# Patient Record
Sex: FEMALE | Race: BLACK OR AFRICAN AMERICAN | Employment: UNEMPLOYED | URBAN - METROPOLITAN AREA
[De-identification: names, ages, dates, MRNs, and addresses within clinical notes are randomized per-mention and may not be internally consistent; named-entity substitution may affect disease eponyms.]

---

## 2017-02-10 ENCOUNTER — APPOINTMENT (OUTPATIENT)
Dept: GENERAL RADIOLOGY | Age: 42
DRG: 191 | End: 2017-02-10
Attending: EMERGENCY MEDICINE
Payer: MEDICARE

## 2017-02-10 ENCOUNTER — HOSPITAL ENCOUNTER (INPATIENT)
Age: 42
LOS: 1 days | Discharge: HOME | DRG: 191 | End: 2017-02-11
Attending: EMERGENCY MEDICINE | Admitting: INTERNAL MEDICINE
Payer: MEDICARE

## 2017-02-10 DIAGNOSIS — D86.9 SARCOIDOSIS: ICD-10-CM

## 2017-02-10 DIAGNOSIS — R06.03 RESPIRATORY DISTRESS: Primary | ICD-10-CM

## 2017-02-10 PROBLEM — J44.0 COPD (CHRONIC OBSTRUCTIVE PULMONARY DISEASE) WITH ACUTE BRONCHITIS (HCC): Status: ACTIVE | Noted: 2017-02-10

## 2017-02-10 PROBLEM — J20.9 COPD (CHRONIC OBSTRUCTIVE PULMONARY DISEASE) WITH ACUTE BRONCHITIS (HCC): Status: ACTIVE | Noted: 2017-02-10

## 2017-02-10 LAB
ALBUMIN SERPL BCP-MCNC: 3.3 G/DL (ref 3.5–5)
ALBUMIN/GLOB SERPL: 0.8 {RATIO} (ref 1.1–2.2)
ALP SERPL-CCNC: 71 U/L (ref 45–117)
ALT SERPL-CCNC: 15 U/L (ref 12–78)
ANION GAP BLD CALC-SCNC: 11 MMOL/L (ref 5–15)
APPEARANCE UR: ABNORMAL
AST SERPL W P-5'-P-CCNC: 14 U/L (ref 15–37)
BACTERIA URNS QL MICRO: NEGATIVE /HPF
BASOPHILS # BLD AUTO: 0 K/UL (ref 0–0.1)
BASOPHILS # BLD: 0 % (ref 0–1)
BILIRUB SERPL-MCNC: 0.4 MG/DL (ref 0.2–1)
BILIRUB UR QL: NEGATIVE
BUN SERPL-MCNC: 7 MG/DL (ref 6–20)
BUN/CREAT SERPL: 7 (ref 12–20)
CALCIUM SERPL-MCNC: 8.7 MG/DL (ref 8.5–10.1)
CHLORIDE SERPL-SCNC: 108 MMOL/L (ref 97–108)
CK SERPL-CCNC: 98 U/L (ref 26–192)
CO2 SERPL-SCNC: 15 MMOL/L (ref 21–32)
COLOR UR: ABNORMAL
CREAT SERPL-MCNC: 1.06 MG/DL (ref 0.55–1.02)
DIFFERENTIAL METHOD BLD: ABNORMAL
EOSINOPHIL # BLD: 0 K/UL (ref 0–0.4)
EOSINOPHIL NFR BLD: 0 % (ref 0–7)
EPITH CASTS URNS QL MICRO: ABNORMAL /LPF
ERYTHROCYTE [DISTWIDTH] IN BLOOD BY AUTOMATED COUNT: 13.9 % (ref 11.5–14.5)
FLUAV AG NPH QL IA: NEGATIVE
FLUBV AG NOSE QL IA: NEGATIVE
GLOBULIN SER CALC-MCNC: 4.1 G/DL (ref 2–4)
GLUCOSE SERPL-MCNC: 94 MG/DL (ref 65–100)
GLUCOSE UR STRIP.AUTO-MCNC: NEGATIVE MG/DL
HCT VFR BLD AUTO: 43.9 % (ref 35–47)
HGB BLD-MCNC: 14.4 G/DL (ref 11.5–16)
HGB UR QL STRIP: NEGATIVE
HYALINE CASTS URNS QL MICRO: ABNORMAL /LPF (ref 0–5)
KETONES UR QL STRIP.AUTO: ABNORMAL MG/DL
LACTATE SERPL-SCNC: 1.4 MMOL/L (ref 0.4–2)
LEUKOCYTE ESTERASE UR QL STRIP.AUTO: NEGATIVE
LYMPHOCYTES # BLD AUTO: 5 % (ref 12–49)
LYMPHOCYTES # BLD: 0.3 K/UL (ref 0.8–3.5)
MCH RBC QN AUTO: 30.4 PG (ref 26–34)
MCHC RBC AUTO-ENTMCNC: 32.8 G/DL (ref 30–36.5)
MCV RBC AUTO: 92.8 FL (ref 80–99)
MONOCYTES # BLD: 0.9 K/UL (ref 0–1)
MONOCYTES NFR BLD AUTO: 14 % (ref 5–13)
NEUTS SEG # BLD: 5.3 K/UL (ref 1.8–8)
NEUTS SEG NFR BLD AUTO: 81 % (ref 32–75)
NITRITE UR QL STRIP.AUTO: NEGATIVE
PH UR STRIP: 8 [PH] (ref 5–8)
PLATELET # BLD AUTO: 156 K/UL (ref 150–400)
POTASSIUM SERPL-SCNC: 3.7 MMOL/L (ref 3.5–5.1)
PROT SERPL-MCNC: 7.4 G/DL (ref 6.4–8.2)
PROT UR STRIP-MCNC: ABNORMAL MG/DL
RBC # BLD AUTO: 4.73 M/UL (ref 3.8–5.2)
RBC #/AREA URNS HPF: ABNORMAL /HPF (ref 0–5)
RBC MORPH BLD: ABNORMAL
SODIUM SERPL-SCNC: 134 MMOL/L (ref 136–145)
SP GR UR REFRACTOMETRY: 1.02 (ref 1–1.03)
TROPONIN I SERPL-MCNC: <0.04 NG/ML
UA: UC IF INDICATED,UAUC: ABNORMAL
UROBILINOGEN UR QL STRIP.AUTO: 1 EU/DL (ref 0.2–1)
WBC # BLD AUTO: 6.5 K/UL (ref 3.6–11)
WBC URNS QL MICRO: ABNORMAL /HPF (ref 0–4)

## 2017-02-10 PROCEDURE — 85025 COMPLETE CBC W/AUTO DIFF WBC: CPT | Performed by: EMERGENCY MEDICINE

## 2017-02-10 PROCEDURE — 74011250636 HC RX REV CODE- 250/636: Performed by: INTERNAL MEDICINE

## 2017-02-10 PROCEDURE — 81001 URINALYSIS AUTO W/SCOPE: CPT | Performed by: EMERGENCY MEDICINE

## 2017-02-10 PROCEDURE — 83605 ASSAY OF LACTIC ACID: CPT | Performed by: EMERGENCY MEDICINE

## 2017-02-10 PROCEDURE — 84484 ASSAY OF TROPONIN QUANT: CPT | Performed by: EMERGENCY MEDICINE

## 2017-02-10 PROCEDURE — 82550 ASSAY OF CK (CPK): CPT | Performed by: EMERGENCY MEDICINE

## 2017-02-10 PROCEDURE — 94640 AIRWAY INHALATION TREATMENT: CPT

## 2017-02-10 PROCEDURE — 36415 COLL VENOUS BLD VENIPUNCTURE: CPT | Performed by: EMERGENCY MEDICINE

## 2017-02-10 PROCEDURE — 74011250637 HC RX REV CODE- 250/637: Performed by: INTERNAL MEDICINE

## 2017-02-10 PROCEDURE — 74011000250 HC RX REV CODE- 250: Performed by: EMERGENCY MEDICINE

## 2017-02-10 PROCEDURE — 77030013140 HC MSK NEB VYRM -A

## 2017-02-10 PROCEDURE — 99285 EMERGENCY DEPT VISIT HI MDM: CPT

## 2017-02-10 PROCEDURE — 87040 BLOOD CULTURE FOR BACTERIA: CPT | Performed by: EMERGENCY MEDICINE

## 2017-02-10 PROCEDURE — 74011000250 HC RX REV CODE- 250: Performed by: INTERNAL MEDICINE

## 2017-02-10 PROCEDURE — 71020 XR CHEST PA LAT: CPT

## 2017-02-10 PROCEDURE — 65660000000 HC RM CCU STEPDOWN

## 2017-02-10 PROCEDURE — 74011250637 HC RX REV CODE- 250/637: Performed by: EMERGENCY MEDICINE

## 2017-02-10 PROCEDURE — 74011250636 HC RX REV CODE- 250/636: Performed by: EMERGENCY MEDICINE

## 2017-02-10 PROCEDURE — 93005 ELECTROCARDIOGRAM TRACING: CPT

## 2017-02-10 PROCEDURE — 80053 COMPREHEN METABOLIC PANEL: CPT | Performed by: EMERGENCY MEDICINE

## 2017-02-10 PROCEDURE — 87804 INFLUENZA ASSAY W/OPTIC: CPT | Performed by: EMERGENCY MEDICINE

## 2017-02-10 RX ORDER — IPRATROPIUM BROMIDE 0.5 MG/2.5ML
0.5 SOLUTION RESPIRATORY (INHALATION)
Status: DISCONTINUED | OUTPATIENT
Start: 2017-02-10 | End: 2017-02-11 | Stop reason: HOSPADM

## 2017-02-10 RX ORDER — ISOSORBIDE MONONITRATE 30 MG/1
30 TABLET, EXTENDED RELEASE ORAL DAILY
COMMUNITY
End: 2019-04-24 | Stop reason: ALTCHOICE

## 2017-02-10 RX ORDER — MELOXICAM 15 MG/1
15 TABLET ORAL DAILY
COMMUNITY
End: 2017-11-30

## 2017-02-10 RX ORDER — TRAMADOL HYDROCHLORIDE 50 MG/1
50 TABLET ORAL
COMMUNITY
End: 2017-11-30

## 2017-02-10 RX ORDER — MELOXICAM 7.5 MG/1
15 TABLET ORAL DAILY
Status: DISCONTINUED | OUTPATIENT
Start: 2017-02-11 | End: 2017-02-11 | Stop reason: HOSPADM

## 2017-02-10 RX ORDER — LISINOPRIL 40 MG/1
40 TABLET ORAL DAILY
COMMUNITY
End: 2019-04-24 | Stop reason: ALTCHOICE

## 2017-02-10 RX ORDER — OMEPRAZOLE 20 MG/1
40 CAPSULE, DELAYED RELEASE ORAL DAILY
COMMUNITY
End: 2018-12-07

## 2017-02-10 RX ORDER — SPIRONOLACTONE 25 MG/1
25 TABLET ORAL 2 TIMES DAILY
Status: DISCONTINUED | OUTPATIENT
Start: 2017-02-10 | End: 2017-02-11 | Stop reason: HOSPADM

## 2017-02-10 RX ORDER — TOPIRAMATE 100 MG/1
100 TABLET, FILM COATED ORAL 2 TIMES DAILY
COMMUNITY
End: 2021-02-18 | Stop reason: ALTCHOICE

## 2017-02-10 RX ORDER — METOPROLOL TARTRATE 25 MG/1
25 TABLET, FILM COATED ORAL 2 TIMES DAILY
COMMUNITY
End: 2019-04-24 | Stop reason: ALTCHOICE

## 2017-02-10 RX ORDER — ATORVASTATIN CALCIUM 40 MG/1
40 TABLET, FILM COATED ORAL DAILY
Status: DISCONTINUED | OUTPATIENT
Start: 2017-02-11 | End: 2017-02-11 | Stop reason: HOSPADM

## 2017-02-10 RX ORDER — IPRATROPIUM BROMIDE 0.5 MG/2.5ML
0.5 SOLUTION RESPIRATORY (INHALATION) AS NEEDED
COMMUNITY
End: 2019-04-24 | Stop reason: ALTCHOICE

## 2017-02-10 RX ORDER — ISOSORBIDE MONONITRATE 30 MG/1
30 TABLET, EXTENDED RELEASE ORAL DAILY
Status: DISCONTINUED | OUTPATIENT
Start: 2017-02-11 | End: 2017-02-11 | Stop reason: HOSPADM

## 2017-02-10 RX ORDER — PANTOPRAZOLE SODIUM 40 MG/1
40 TABLET, DELAYED RELEASE ORAL
Status: DISCONTINUED | OUTPATIENT
Start: 2017-02-11 | End: 2017-02-11 | Stop reason: HOSPADM

## 2017-02-10 RX ORDER — LISINOPRIL 20 MG/1
40 TABLET ORAL DAILY
Status: DISCONTINUED | OUTPATIENT
Start: 2017-02-11 | End: 2017-02-11 | Stop reason: HOSPADM

## 2017-02-10 RX ORDER — ACETAMINOPHEN 325 MG/1
650 TABLET ORAL
Status: DISCONTINUED | OUTPATIENT
Start: 2017-02-10 | End: 2017-02-11

## 2017-02-10 RX ORDER — TOPIRAMATE 100 MG/1
100 TABLET, FILM COATED ORAL 2 TIMES DAILY
Status: DISCONTINUED | OUTPATIENT
Start: 2017-02-10 | End: 2017-02-11 | Stop reason: HOSPADM

## 2017-02-10 RX ORDER — LEVOFLOXACIN 5 MG/ML
500 INJECTION, SOLUTION INTRAVENOUS EVERY 24 HOURS
Status: DISCONTINUED | OUTPATIENT
Start: 2017-02-10 | End: 2017-02-11 | Stop reason: HOSPADM

## 2017-02-10 RX ORDER — HYDROXYZINE PAMOATE 25 MG/1
25 CAPSULE ORAL 2 TIMES DAILY
COMMUNITY
End: 2019-04-24

## 2017-02-10 RX ORDER — METOPROLOL TARTRATE 25 MG/1
25 TABLET, FILM COATED ORAL 2 TIMES DAILY
Status: DISCONTINUED | OUTPATIENT
Start: 2017-02-10 | End: 2017-02-11 | Stop reason: HOSPADM

## 2017-02-10 RX ORDER — ACYCLOVIR 400 MG/1
400 TABLET ORAL 2 TIMES DAILY
COMMUNITY
End: 2019-04-24 | Stop reason: ALTCHOICE

## 2017-02-10 RX ORDER — ALBUTEROL SULFATE 0.83 MG/ML
1.25 SOLUTION RESPIRATORY (INHALATION)
Status: DISCONTINUED | OUTPATIENT
Start: 2017-02-10 | End: 2017-02-11 | Stop reason: HOSPADM

## 2017-02-10 RX ORDER — FLUTICASONE PROPIONATE 50 MCG
2 SPRAY, SUSPENSION (ML) NASAL DAILY
Status: DISCONTINUED | OUTPATIENT
Start: 2017-02-11 | End: 2017-02-11 | Stop reason: HOSPADM

## 2017-02-10 RX ORDER — OMEPRAZOLE 20 MG/1
40 CAPSULE, DELAYED RELEASE ORAL DAILY
Status: DISCONTINUED | OUTPATIENT
Start: 2017-02-11 | End: 2017-02-10 | Stop reason: CLARIF

## 2017-02-10 RX ORDER — HYDROXYZINE 25 MG/1
25 TABLET, FILM COATED ORAL 2 TIMES DAILY
Status: DISCONTINUED | OUTPATIENT
Start: 2017-02-10 | End: 2017-02-11 | Stop reason: HOSPADM

## 2017-02-10 RX ORDER — HYDROCODONE POLISTIREX AND CHLORPHENIRAMINE POLISTIREX 10; 8 MG/5ML; MG/5ML
5 SUSPENSION, EXTENDED RELEASE ORAL
Status: DISCONTINUED | OUTPATIENT
Start: 2017-02-10 | End: 2017-02-11 | Stop reason: HOSPADM

## 2017-02-10 RX ORDER — TRAMADOL HYDROCHLORIDE 50 MG/1
50 TABLET ORAL
Status: DISCONTINUED | OUTPATIENT
Start: 2017-02-10 | End: 2017-02-11 | Stop reason: HOSPADM

## 2017-02-10 RX ORDER — BENZONATATE 100 MG/1
100 CAPSULE ORAL
Status: DISCONTINUED | OUTPATIENT
Start: 2017-02-10 | End: 2017-02-11 | Stop reason: HOSPADM

## 2017-02-10 RX ORDER — SPIRONOLACTONE 25 MG/1
25 TABLET ORAL 2 TIMES DAILY
COMMUNITY
End: 2018-12-07

## 2017-02-10 RX ORDER — FLUTICASONE PROPIONATE 50 MCG
2 SPRAY, SUSPENSION (ML) NASAL DAILY
COMMUNITY

## 2017-02-10 RX ORDER — SODIUM CHLORIDE 0.9 % (FLUSH) 0.9 %
5-10 SYRINGE (ML) INJECTION EVERY 8 HOURS
Status: DISCONTINUED | OUTPATIENT
Start: 2017-02-10 | End: 2017-02-11 | Stop reason: HOSPADM

## 2017-02-10 RX ORDER — ACETAMINOPHEN 500 MG
1000 TABLET ORAL ONCE
Status: COMPLETED | OUTPATIENT
Start: 2017-02-10 | End: 2017-02-10

## 2017-02-10 RX ORDER — GABAPENTIN 600 MG/1
600 TABLET ORAL 2 TIMES DAILY
COMMUNITY
End: 2017-11-30

## 2017-02-10 RX ORDER — SODIUM CHLORIDE 0.9 % (FLUSH) 0.9 %
5-10 SYRINGE (ML) INJECTION AS NEEDED
Status: DISCONTINUED | OUTPATIENT
Start: 2017-02-10 | End: 2017-02-11 | Stop reason: HOSPADM

## 2017-02-10 RX ORDER — GABAPENTIN 600 MG/1
600 TABLET ORAL 2 TIMES DAILY
Status: DISCONTINUED | OUTPATIENT
Start: 2017-02-10 | End: 2017-02-11 | Stop reason: HOSPADM

## 2017-02-10 RX ORDER — FUROSEMIDE 40 MG/1
40 TABLET ORAL DAILY
Status: DISCONTINUED | OUTPATIENT
Start: 2017-02-11 | End: 2017-02-11 | Stop reason: HOSPADM

## 2017-02-10 RX ADMIN — GABAPENTIN 600 MG: 600 TABLET, FILM COATED ORAL at 18:25

## 2017-02-10 RX ADMIN — TOPIRAMATE 100 MG: 100 TABLET, FILM COATED ORAL at 18:25

## 2017-02-10 RX ADMIN — Medication 10 ML: at 18:24

## 2017-02-10 RX ADMIN — Medication 10 ML: at 21:49

## 2017-02-10 RX ADMIN — METHYLPREDNISOLONE SODIUM SUCCINATE 60 MG: 125 INJECTION, POWDER, FOR SOLUTION INTRAMUSCULAR; INTRAVENOUS at 18:23

## 2017-02-10 RX ADMIN — TRAMADOL HYDROCHLORIDE 50 MG: 50 TABLET, FILM COATED ORAL at 15:40

## 2017-02-10 RX ADMIN — SPIRONOLACTONE 25 MG: 25 TABLET, FILM COATED ORAL at 18:25

## 2017-02-10 RX ADMIN — ALBUTEROL SULFATE 1 DOSE: 2.5 SOLUTION RESPIRATORY (INHALATION) at 14:44

## 2017-02-10 RX ADMIN — BENZONATATE 100 MG: 100 CAPSULE ORAL at 18:24

## 2017-02-10 RX ADMIN — SODIUM CHLORIDE 1000 ML: 900 INJECTION, SOLUTION INTRAVENOUS at 13:27

## 2017-02-10 RX ADMIN — LEVOFLOXACIN 500 MG: 5 INJECTION, SOLUTION INTRAVENOUS at 15:40

## 2017-02-10 RX ADMIN — METHYLPREDNISOLONE SODIUM SUCCINATE 60 MG: 125 INJECTION, POWDER, FOR SOLUTION INTRAMUSCULAR; INTRAVENOUS at 23:22

## 2017-02-10 RX ADMIN — ACETAMINOPHEN 1000 MG: 500 TABLET, FILM COATED ORAL at 13:27

## 2017-02-10 RX ADMIN — ALBUTEROL SULFATE 1.25 MG: 2.5 SOLUTION RESPIRATORY (INHALATION) at 21:40

## 2017-02-10 RX ADMIN — HYDROXYZINE HYDROCHLORIDE 25 MG: 25 TABLET, FILM COATED ORAL at 21:47

## 2017-02-10 RX ADMIN — METOPROLOL TARTRATE 25 MG: 25 TABLET ORAL at 21:46

## 2017-02-10 RX ADMIN — TRAMADOL HYDROCHLORIDE 50 MG: 50 TABLET, FILM COATED ORAL at 23:22

## 2017-02-10 RX ADMIN — ACETAMINOPHEN 650 MG: 325 TABLET, FILM COATED ORAL at 18:24

## 2017-02-10 RX ADMIN — IPRATROPIUM BROMIDE 0.5 MG: 0.5 SOLUTION RESPIRATORY (INHALATION) at 21:40

## 2017-02-10 NOTE — H&P
[address]   [address] [place] [address]   HISTORY AND PHYSICAL       Name:  Nitin Castillo   MR#:  [id number]   :  1975   Account #:  [de-identified]        Date of Adm:  02/10/2017       PRIMARY ADMITTING DIAGNOSES   1. Acute respiratory distress. 2. Low-grade temperature, likely viral syndrome. 2. Acute bronchitis. 3. Possible underlying chronic obstructive pulmonary disease   exacerbation. SECONDARY DIAGNOSES   1. History of schizophrenia for which she is on Latuda and Depakote. 2. History of cocaine dependence. 3. History of secondary pulmonary hypertension for which she wears   CPAP at night. 4. Obesity hypoventilation syndrome for which she is on CPAP, but no   longer qualifies for home oxygen. 5. History of recurrent depression. 6. Bipolar disorder. 7. History of sarcoidosis. 8. Bilateral knee pain secondary to osteoarthritis and obesity. 9. History of chronic kidney disease, stage I.   10. Chronic pain. 11. Hypertensive cardiomyopathy for which she is on lisinopril,   preserved EF.    HOME MEDICATIONS   Include:   1. Latuda 20 mg p.o. daily. 2. Ventolin inhaler 3 puffs twice daily as needed. 3. Vistaril 25 mg twice daily. 4. Acyclovir 500 mg twice daily. 5. Atorvastatin 20 mg daily. 6. Flonase 2 sprays each nostril. 7. Lasix 40 mg as directed. 8. Gabapentin 600 mg twice daily. 9. Atrovent inhaler every 6 hours as needed. 10. Imdur 30 mg daily. 11. Lisinopril 40 mg daily. 12. Meloxicam 15 mg daily. 13. Metoprolol 25 mg twice daily. 14. Omeprazole 20 mg 2 tabs daily. 15. Spironolactone 25 mg twice daily. 16. Topamax 100 mg twice daily. 17. Tramadol 50 mg 1 p.o. t.i.d. PAST SURGICAL HISTORY: Includes a hernia repair at age 16. FAMILY HISTORY: Noncontributory. SOCIAL HISTORY: She does have current tobacco use. A fourth pack   per day for 23 years. No history of alcohol.  She denies a history of   drug use, but has a previous medical history of cocaine abuse. She is   sexually active. Wishes to be FULL CODE at this time. ALLERGIES: INCLUDE MORPHINE. HISTORY OF PRESENT ILLNESS: The patient is a 20-year-old   female who presented originally to the [facility] office after a history   since Sunday of having worsening shortness of breath, cough, fevers,   malaise, arthralgias and chills at home. The patient when asked why   she did not come into the office earlier, she reported that she thought   this was simply a cold that she would get over, but unfortunately her   symptoms continued to worsen to the point that she was having   difficulty breathing last night. She, therefore, came into the [facility]   office for evaluation today. Although her chest x-ray was unremarkable,   she has a long-standing history of pneumonia, as well as COPD and   sarcoidosis. The patient had a flu test as well, which came back   negative in the office and it was felt that given her symptoms, she   could either be treated in the office or come into the emergency room. The patient had reported that she did not want to be treated in the   office, but instead was very concerned and wanted to be transferred to   the ER. She was noted to have a low-grade temperature of \"105\", but   saturations of 100% in the emergency room, however, she continued   to complain of chest pain and shortness of breath. It was felt that she   would need to come in for further evaluation and workup. REVIEW OF SYSTEMS   CONSTITUTIONAL: Positive for the fevers and chills. She also reports   generalized malaise and weakness. She reports a sore throat, but no   cough, which has been nonproductive. No rhinorrhea. She does have   some congestion. She reports some chest pain with her cough, but no   history of coronary artery disease and no dyspnea on exertion. No   orthopnea. GI: Positive for diffuse abdominal pain, but no diarrhea or constipation.    No history of nausea or vomiting. No history of black or bloody stools. : No hematuria or dysuria. MUSCULOSKELETAL: She has chronic arthralgias as well as   malaise. She reports chronic knee pain. She also has chronic back   pain. SKIN: No new rashes. NEUROLOGIC: No history of CVA, TIA, seizures, or syncope. PSYCHIATRIC: Positive for schizophrenia as well as bipolar disorder. PHYSICAL EXAMINATION   VITAL SIGNS: Temperature 100.5, pulse of 109, respirations of 15,   blood pressure 121/61, saturations 99% on 2 liters nasal cannula. GENERAL: The patient is awake, alert. She is oriented to person,   place and time, not in acute distress. HEENT: Normocephalic, atraumatic. Pupils are round and reactive to   light. Extraocular muscles are intact. NECK: Supple, no JVD, no carotid bruit. CARDIOVASCULAR: Regular S1, S2, no murmurs, gallops, or rubs   appreciated. LUNGS: Clear to auscultation bilaterally with mild end-expiratory   wheeze but otherwise, no rhonchi, no rales, no retractions, no   increased work of breathing noted. ABDOMEN: Obese. Soft, nontender, nondistended, positive bowel   sounds. EXTREMITIES: No clubbing, cyanosis or edema is noted. NEUROLOGIC: The patient is awake, alert, oriented. Speech,   language, memory is intact. Cranial nerves 2 through 12 are grossly   intact. No focal neurologic deficits noted. PSYCHIATRIC: Mood and affect are appropriate. LABORATORY DATA: WBC 6.5, hemoglobin 14.4, hematocrit 43.9,   platelets 041. UA is largely unremarkable. Sodium 134, potassium 3.7,   chloride 108, bicarbonate 15, BUN 7, creatinine 1.06, glucose 94,   albumin 3.3, ALT 15, AST 14, alkaline phosphatase 71. Cardiac   enzymes negative. Influenza A and B negative. Blood cultures drawn,   currently pending.     ASSESSMENT AND PLAN: Respiratory distress, likely multifactorial   secondary to obstructive sleep apnea as well as possible underlying   viral syndrome and mild chronic obstructive pulmonary disease    exacerbation. I have asked Pulmonary to evaluate as she does follow   pulmonary on an outpatient basis. We will start her current nebulizers   as well as steroids, continue her CPAP at night. We will give Levaquin   for now for chronic obstructive pulmonary disease exacerbation,   although I do not feel that antibiotics in general are likely needed   outpatient. We will continue her Lasix for diuresis and monitor her   kidney function. If she improves in the next 24 hours, we will discharge   to home. We will repeat an ABG in the morning.         [facility] D[facility] / [email or web address]   D:  02/10/2017   15:37   T:  02/10/2017   16:18   Job #:  [id number]

## 2017-02-10 NOTE — IP AVS SNAPSHOT
Summary of Care Report The Summary of Care report has been created to help improve care coordination. Users with access to [email or web address] or [address] (Web-based application) may access additional patient information including the Discharge Summary. If you are not currently a [address] user and need more information, please call the number listed below in the [place] [address] section and ask to be connected with Medical Records. Facility Information Name Address Phone [address] [facility] 7 [id number] [phone] Patient Information Patient Name Sex OCTAVIO Travis ([id number]) Female 1975 Discharge Information Admitting Provider Service Area [facility] DO / [address] [place] [address] 4 Surg/Bariatrics / [phone] Discharge Provider Discharge Date/Time Discharge Disposition Destination (none) 2017 Midday (Pending) AHR (none) Patient Language Language ENGLISH [13] Problem List as of 2017  Date Reviewed: 2014 Codes Priority Class Noted - Resolved Sarcoidosis ([facility].) ICD-10-CM: D86.9 ICD-9-CM: 135   Unknown - Present RESOLVED: Obesity ICD-10-CM: E66.9 ICD-9-CM: 278.00   Unknown - 1/3/2016 Hypertension ICD-10-CM: I10 
ICD-9-CM: 401.9   Unknown - Present Bipolar disorder ([facility].) ICD-10-CM: F31.9 ICD-9-CM: 296.80   Unknown - Present Asthma ICD-10-CM: K31.597 ICD-9-CM: 493.90   Unknown - Present Obstructive sleep apnea ICD-10-CM: G47.33 
ICD-9-CM: 327.23   Unknown - Present GERD (gastroesophageal reflux disease) ICD-10-CM: K21.9 ICD-9-CM: 530.81   Unknown - Present Arthritis ICD-10-CM: M19.90 ICD-9-CM: 716.90   Unknown - Present Morbid obesity ([facility].) ICD-10-CM: E66.01 
ICD-9-CM: 278.01   Unknown - Present RESOLVED: Respiratory failure with hypoxia ([facility] ICD-10-CM: J96.91 
ICD-9-CM: 518.81   11/5/2015 - 1/3/2016 Respiratory failure with hypoxia and hypercapnia (HCC) ICD-10-CM: J96.91, J96.92 
ICD-9-CM: 518.81   1/3/2016 - Present COPD (chronic obstructive pulmonary disease) with acute bronchitis (HCC) ICD-10-CM: J44.0 ICD-9-CM: 491.22   2/10/2017 - Present You are allergic to the following Allergen Reactions Morphine Itching Swelling Current Discharge Medication List  
  
START taking these medications Dose & Instructions Dispensing Information Comments  
 acetaminophen 32MG/ML Soln solution Commonly known as:  TYLENOL Dose:  650 mg Take 20.3 mL by mouth every six (6) hours as needed. Quantity:  500 mL Refills:  0  
   
 chlorpheniramine-HYDROcodone 10-8 mg/5 mL suspension Commonly known as:  Leonetta Castro Dose:  5 mL Take 5 mL by mouth every twelve (12) hours as needed for Cough. Max Daily Amount: 10 mL. Quantity:  100 mL Refills:  0  
   
 levoFLOXacin 500 mg tablet Commonly known as:  Yomi Claude Dose:  500 mg Take 1 Tab by mouth daily. Quantity:  10 Tab Refills:  0 CONTINUE these medications which have NOT CHANGED Dose & Instructions Dispensing Information Comments  
 acyclovir 400 mg tablet Commonly known as:  ZOVIRAX Dose:  400 mg Take 400 mg by mouth two (2) times a day. Refills:  0  
   
 albuterol 90 mcg/actuation inhaler Commonly known as:  PROVENTIL HFA, VENTOLIN HFA, PROAIR HFA Dose:  2 Puff Take 2 Puffs by inhalation every four (4) hours as needed for Wheezing. Quantity:  1 Inhaler Refills:  1  
   
 albuterol sulfate 2.5 mg/0.5 mL Nebu 2.5 mg, ipratropium 0.02 % Soln 0.5 mg  
 Dose:  1 Dose 1 Dose by Nebulization route two (2) times a day. Refills:  0  
   
 atorvastatin 20 mg tablet Commonly known as:  LIPITOR Dose:  40 mg Take 40 mg by mouth daily. Refills:  0 FLONASE 50 mcg/actuation nasal spray Generic drug:  fluticasone Dose:  2 Spray 2 Sprays by Both Nostrils route daily. Refills:  0  
   
 furosemide 40 mg tablet Commonly known as:  LASIX Dose:  40 mg Take 40 mg by mouth daily. Refills:  0  
   
 gabapentin 600 mg tablet Commonly known as:  NEURONTIN Dose:  600 mg Take 600 mg by mouth two (2) times a day. Refills:  0  
   
 ipratropium 0.02 % nebulizer solution Commonly known as:  ATROVENT Dose:  0.5 mg  
0.5 mg by Nebulization route as needed for Wheezing. Refills:  0  
   
 isosorbide mononitrate ER 30 mg tablet Commonly known as:  IMDUR Dose:  30 mg Take 30 mg by mouth daily. Refills:  0  
   
 LATUDA 20 mg Tab tablet Generic drug:  lurasidone Dose:  20 mg Take 20 mg by mouth daily. Refills:  0  
   
 lisinopril 40 mg tablet Commonly known as:  Monika November Dose:  40 mg Take 40 mg by mouth daily. Refills:  0  
   
 meloxicam 15 mg tablet Commonly known as:  MOBIC Dose:  15 mg Take 15 mg by mouth daily. Refills:  0  
   
 metoprolol tartrate 25 mg tablet Commonly known as:  LOPRESSOR Dose:  25 mg Take 25 mg by mouth two (2) times a day. Refills:  0  
   
 omeprazole 20 mg capsule Commonly known as:  PRILOSEC Dose:  40 mg Take 40 mg by mouth daily. Refills:  0  
   
 spironolactone 25 mg tablet Commonly known as:  ALDACTONE Dose:  25 mg Take 25 mg by mouth two (2) times a day. Refills:  0  
   
 topiramate 100 mg tablet Commonly known as:  TOPAMAX Dose:  100 mg Take 100 mg by mouth two (2) times a day. Refills:  0  
   
 traMADol 50 mg tablet Commonly known as:  ULTRAM  
 Dose:  50 mg Take 50 mg by mouth every eight (8) hours as needed for Pain. Refills:  0  
   
 VISTARIL 25 mg capsule Generic drug:  hydrOXYzine pamoate Dose:  25 mg Take 25 mg by mouth two (2) times a day. Refills:  0 Current Immunizations Name Date PPD 1/1/2011 Follow-up Information Follow up With Details Comments Contact Info Provider Unknown   Patient not available to ask Discharge Instructions DISCHARGE SUMMARY from Nurse The following personal items are in your possession at time of discharge: 
 
Dental Appliances: None Visual Aid: None Home Medications: None Jewelry: None Clothing: At bedside Other Valuables: None The discharge information has been reviewed with the patient. The patient verbalized understanding. Discharge medications reviewed with the patient and appropriate educational materials and side effects teaching were provided. Chart Review Routing History Recipient Method Report Sent By Lorie White MD  
Phone: [phone] In ADVIZE IP Auto Routed Notes Delfino Peng MD [[id number]] 5/6/2013  2:03 PM 05/06/2013 Darshan Cardenas MD  
Phone: [phone] In ADVIZE Note Review Delfino Peng MD [[id number]] 12/2/2013  9:28 AM 11/27/2013 Darshan Cardenas MD  
Phone: [phone] In Basket IP Auto Routed Anaya Herr MD [[id number]] 1/9/2014  8:19 PM 01/09/2014 Anupama White MD  
Phone: [phone] In Basket IP Auto Routed Anaya Herr MD [[id number]] 1/9/2014  8:19 PM 01/09/2014 Provider Unknown, MD  
Patient not available to ask Sarah Parisi Mail IP Auto Routed Notes [place], [place] [[id number]] 2/11/2017 12:17 PM 02/11/2017

## 2017-02-10 NOTE — IP AVS SNAPSHOT
[address] 
[phone] Patient: Wendi Eckert MRN: [id number] :1975 You are allergic to the following Allergen Reactions Morphine Itching Swelling Recent Documentation Height Weight BMI OB Status Smoking Status 1.549 m 154.2 kg 64.24 kg/m2 Having regular periods Current Every Day Smoker Unresulted Labs Order Current Status CULTURE, BLOOD Preliminary result CULTURE, BLOOD Preliminary result Emergency Contacts Name Discharge Info Relation Home Work Mobile Jh Coelho N/A  AT THIS TIME [6] Mother [14] [phone] About your hospitalization You were admitted on:  February 10, 2017 You last received care in the:  [facility] 4 SURG/BARIATRICS You were discharged on:  2017 Unit phone number:  [phone] Why you were hospitalized Your primary diagnosis was:  Not on File Your diagnoses also included:  Copd (Chronic Obstructive Pulmonary Disease) With Acute Bronchitis (Hcc) Providers Seen During Your Hospitalizations Provider Role Specialty Primary office phone Sarah Cochran MD Attending Provider Emergency Medicine [phone] [address] [place] [address] Attending Provider Internal Medicine [phone] Your Primary Care Physician (PCP) Primary Care Physician Office Phone Office Fax UNKNOWN, PROVIDER ** None ** ** None ** Follow-up Information Follow up With Details Comments Contact Info Provider Unknown   Patient not available to ask Current Discharge Medication List  
  
START taking these medications Dose & Instructions Dispensing Information Comments Morning Noon Evening Bedtime  
 acetaminophen 32MG/ML Soln solution Commonly known as:  TYLENOL Your next dose is: Today, Tomorrow Other:  _________  Dose:  650 mg  
 Take 20.3 mL by mouth every six (6) hours as needed. Quantity:  500 mL Refills:  0  
     
   
   
   
  
 chlorpheniramine-HYDROcodone 10-8 mg/5 mL suspension Commonly known as:  Ventura Siemens Your next dose is: Today, Tomorrow Other:  _________ Dose:  5 mL Take 5 mL by mouth every twelve (12) hours as needed for Cough. Max Daily Amount: 10 mL. Quantity:  100 mL Refills:  0  
     
   
   
   
  
 levoFLOXacin 500 mg tablet Commonly known as:  Harjinder Chung Your next dose is: Today, Tomorrow Other:  _________ Dose:  500 mg Take 1 Tab by mouth daily. Quantity:  10 Tab Refills:  0 CONTINUE these medications which have NOT CHANGED Dose & Instructions Dispensing Information Comments Morning Noon Evening Bedtime  
 acyclovir 400 mg tablet Commonly known as:  ZOVIRAX Your next dose is: Today, Tomorrow Other:  _________ Dose:  400 mg Take 400 mg by mouth two (2) times a day. Refills:  0  
     
   
   
   
  
 albuterol 90 mcg/actuation inhaler Commonly known as:  PROVENTIL HFA, VENTOLIN HFA, PROAIR HFA Your next dose is: Today, Tomorrow Other:  _________ Dose:  2 Puff Take 2 Puffs by inhalation every four (4) hours as needed for Wheezing. Quantity:  1 Inhaler Refills:  1  
     
   
   
   
  
 albuterol sulfate 2.5 mg/0.5 mL Nebu 2.5 mg, ipratropium 0.02 % Soln 0.5 mg Your next dose is: Today, Tomorrow Other:  _________ Dose:  1 Dose 1 Dose by Nebulization route two (2) times a day. Refills:  0  
     
   
   
   
  
 atorvastatin 20 mg tablet Commonly known as:  LIPITOR Your next dose is: Today, Tomorrow Other:  _________ Dose:  40 mg Take 40 mg by mouth daily. Refills:  0  
     
   
   
   
  
 FLONASE 50 mcg/actuation nasal spray Generic drug:  fluticasone Your next dose is: Today, Tomorrow Other:  _________ Dose:  2 Spray 2 Sprays by Both Nostrils route daily. Refills:  0  
     
   
   
   
  
 furosemide 40 mg tablet Commonly known as:  LASIX Your next dose is: Today, Tomorrow Other:  _________ Dose:  40 mg Take 40 mg by mouth daily. Refills:  0  
     
   
   
   
  
 gabapentin 600 mg tablet Commonly known as:  NEURONTIN Your next dose is: Today, Tomorrow Other:  _________ Dose:  600 mg Take 600 mg by mouth two (2) times a day. Refills:  0  
     
   
   
   
  
 ipratropium 0.02 % nebulizer solution Commonly known as:  ATROVENT Your next dose is: Today, Tomorrow Other:  _________ Dose:  0.5 mg  
0.5 mg by Nebulization route as needed for Wheezing. Refills:  0  
     
   
   
   
  
 isosorbide mononitrate ER 30 mg tablet Commonly known as:  IMDUR Your next dose is: Today, Tomorrow Other:  _________ Dose:  30 mg Take 30 mg by mouth daily. Refills:  0  
     
   
   
   
  
 LATUDA 20 mg Tab tablet Generic drug:  lurasidone Your next dose is: Today, Tomorrow Other:  _________ Dose:  20 mg Take 20 mg by mouth daily. Refills:  0  
     
   
   
   
  
 lisinopril 40 mg tablet Commonly known as:  Mollie Ripa Your next dose is: Today, Tomorrow Other:  _________ Dose:  40 mg Take 40 mg by mouth daily. Refills:  0  
     
   
   
   
  
 meloxicam 15 mg tablet Commonly known as:  MOBIC Your next dose is: Today, Tomorrow Other:  _________ Dose:  15 mg Take 15 mg by mouth daily. Refills:  0  
     
   
   
   
  
 metoprolol tartrate 25 mg tablet Commonly known as:  LOPRESSOR Your next dose is: Today, Tomorrow Other:  _________ Dose:  25 mg Take 25 mg by mouth two (2) times a day. Refills:  0 omeprazole 20 mg capsule Commonly known as:  PRILOSEC Your next dose is: Today, Tomorrow Other:  _________ Dose:  40 mg Take 40 mg by mouth daily. Refills:  0  
     
   
   
   
  
 spironolactone 25 mg tablet Commonly known as:  ALDACTONE Your next dose is: Today, Tomorrow Other:  _________ Dose:  25 mg Take 25 mg by mouth two (2) times a day. Refills:  0  
     
   
   
   
  
 topiramate 100 mg tablet Commonly known as:  TOPAMAX Your next dose is: Today, Tomorrow Other:  _________ Dose:  100 mg Take 100 mg by mouth two (2) times a day. Refills:  0  
     
   
   
   
  
 traMADol 50 mg tablet Commonly known as:  ULTRAM  
   
Your next dose is: Today, Tomorrow Other:  _________ Dose:  50 mg Take 50 mg by mouth every eight (8) hours as needed for Pain. Refills:  0  
     
   
   
   
  
 VISTARIL 25 mg capsule Generic drug:  hydrOXYzine pamoate Your next dose is: Today, Tomorrow Other:  _________ Dose:  25 mg Take 25 mg by mouth two (2) times a day. Refills:  0 Where to Get Your Medications Information on where to get these meds will be given to you by the nurse or doctor. ! Ask your nurse or doctor about these medications  
  acetaminophen 32MG/ML Soln solution  
 chlorpheniramine-HYDROcodone 10-8 mg/5 mL suspension  
 levoFLOXacin 500 mg tablet Discharge Instructions DISCHARGE SUMMARY from Nurse The following personal items are in your possession at time of discharge: 
 
Dental Appliances: None Visual Aid: None Home Medications: None Jewelry: None Clothing: At bedside Other Valuables: None The discharge information has been reviewed with the patient. The patient verbalized understanding.  
 
Discharge medications reviewed with the patient and appropriate educational materials and side effects teaching were provided. Discharge Orders None [employer] Announcement We are excited to announce that we are making your provider's discharge notes available to you in [employer]. You will see these notes when they are completed and signed by the physician that discharged you from your recent hospital stay. If you have any questions or concerns about any information you see in [employer], please call the Health Information Department where you were seen or reach out to your Primary Care Provider for more information about your plan of care. Introducing \[facility]\"" & HEALTH SERVICES! [address] [facility] introduces MeetingSprout patient portal. Now you can access parts of your medical record, email your doctor's office, and request medication refills online. 1. In your internet browser, go to [email or web address] 2. Click on the First Time User? Click Here link in the Sign In box. You will see the New Member Sign Up page. 3. Enter your [employer]Spr[employer] Access Code exactly as it appears below. You will not need to use this code after youve completed the sign-up process. If you do not sign up before the expiration date, you must request a new code. · [employer] Access Code: [email or web address] Expires: 5/11/2017 12:46 PM 
 
4. Enter the last four digits of your Social Security Number (xxxx) and Date of Birth (mm/dd/yyyy) as indicated and click Submit. You will be taken to the next sign-up page. 5. Create a MeetingSprout ID. This will be your [employer]Spr[employer] login ID and cannot be changed, so think of one that is secure and easy to remember. 6. Create a [employer] password. You can change your password at any time. 7. Enter your Password Reset Question and Answer. This can be used at a later time if you forget your password. 8. Enter your e-mail address. You will receive e-mail notification when new information is available in [address] 9. Click Sign Up. You can now view and download portions of your medical record. 10. Click the Download Summary menu link to download a portable copy of your medical information. If you have questions, please visit the Frequently Asked Questions section of the [employer] website. Remember, [employer] is NOT to be used for urgent needs. For medical emergencies, dial 911. Now available from your iPhone and Android! General Information Please provide this summary of care documentation to your next provider. Patient Signature:  ____________________________________________________________ Date:  ____________________________________________________________  
  
Dimas Calvo Provider Signature:  ____________________________________________________________ Date:  ____________________________________________________________

## 2017-02-10 NOTE — PROGRESS NOTES
Admission Medication Reconciliation:    Information obtained from: Patient, External medication records, and Rx Query    Significant PMH/Disease States:   Past Medical History   Diagnosis Date    Arthritis     Asthma     B[facility] disorder [facility]     GERD (gastroesophageal reflux disease)     Heart failure ([facility]     Hypertension     Ill-defined condition      sarcoidosis    Morbid obesity ([facility]     Obstructive sleep apnea     Pseudotumor cerebri     Psychiatric disorder     Sarcoidosis ([facility]     Schizophrenia ([facility]        Chief Complaint for this Admission:  Generalized Body Aches: SOB; cough    Allergies:  Morphine    Prior to Admission Medications:   Prior to Admission Medications   Prescriptions Last Dose Informant Patient Reported? Taking?   acyclovir (ZOVIRAX) 400 mg tablet 2017 at Unknown time  Yes Yes   Sig: Take 400 mg by mouth two (2) times a day. albuterol (PROVENTIL HFA, VENTOLIN HFA, PROAIR HFA) 90 mcg/actuation inhaler   No Yes   Sig: Take 2 Puffs by inhalation every four (4) hours as needed for Wheezing. albuterol sulfate 2.5 mg/0.5 mL Nebu 2.5 mg, ipratropium 0.02 % Soln 0.5 mg   Yes Yes   Si Dose by Nebulization route two (2) times a day. atorvastatin (LIPITOR) 20 mg tablet 2017 at Unknown time  Yes Yes   Sig: Take  by mouth daily. fluticasone (FLONASE) 50 mcg/actuation nasal spray 2017 at Unknown time  Yes Yes   Si Sprays by Both Nostrils route daily. furosemide (LASIX) 40 mg tablet 2017 at Unknown time  Yes Yes   Sig: Take 40 mg by mouth daily. gabapentin (NEURONTIN) 600 mg tablet 2017 at Unknown time  Yes Yes   Sig: Take 600 mg by mouth two (2) times a day.   hydrOXYzine pamoate (VISTARIL) 25 mg capsule 2017 at Unknown time  Yes Yes   Sig: Take 25 mg by mouth two (2) times a day. ipratropium (ATROVENT) 0.02 % nebulizer solution   Yes Yes   Si.5 mg by Nebulization route as needed for Wheezing.    isosorbide mononitrate ER (IMDUR) 30 mg tablet 2/9/2017 at Unknown time  Yes Yes   Sig: Take 30 mg by mouth daily. lisinopril (PRINIVIL, ZESTRIL) 40 mg tablet 2/9/2017 at Unknown time  Yes Yes   Sig: Take 40 mg by mouth daily. lurasidone (LATUDA) 20 mg tab tablet 2/9/2017 at Unknown time  Yes Yes   Sig: Take 20 mg by mouth daily. meloxicam (MOBIC) 15 mg tablet 2/9/2017 at Unknown time  Yes Yes   Sig: Take 15 mg by mouth daily. metoprolol tartrate (LOPRESSOR) 25 mg tablet 2/9/2017 at Unknown time  Yes Yes   Sig: Take 25 mg by mouth two (2) times a day. omeprazole (PRILOSEC) 20 mg capsule 2/9/2017 at Unknown time  Yes Yes   Sig: Take 40 mg by mouth daily. spironolactone (ALDACTONE) 25 mg tablet 2/9/2017 at Unknown time  Yes Yes   Sig: Take 25 mg by mouth two (2) times a day. topiramate (TOPAMAX) 100 mg tablet 2/9/2017 at Unknown time  Yes Yes   Sig: Take 100 mg by mouth two (2) times a day. traMADol (ULTRAM) 50 mg tablet   Yes Yes   Sig: Take 50 mg by mouth every eight (8) hours as needed for Pain. Facility-Administered Medications: None         Comments/Recommendations:   Verified last dose with patient. Compared external medication records [facility]) with Rx Query. Update last dose. Deleted carvedilol, divalproex, prinzide, and naproxen. Added: acyclovir, flonase, gabapentin, ipratropium, isosorbide mononitrate, latuda, lisinopril, meloxicam, metoprolol tartrate, omeprazole, spironolactone topiramate, tramadol, vistaril.       Thank you for allowing me to participate in the care of this patient  Federico Aguilera, PharmD Candidate 2017

## 2017-02-10 NOTE — ED PROVIDER NOTES
HPI Comments: 37 y.o. female with past medical history significant for sarcoidosis, hypertension, bipolar disorder, asthma, sleep apnea, GERD, arthritis, pseudotumor cerebri, morbid obesity, schizophrenia and heart failure who presents via EMS with chief complaint of cough. Patient reports that 5 days ago she began to experience a productive cough, burning chest pain, sore throat, myalgias, arthralgias, fever and chills. She describes her chest pain as burning with an area of sharp chest pain in her left anterior chest. She generally rates her pain at 10/10. She denies any urinary symptoms or change in bowel movements. She notes lower abdominal pain which also began at the onset of her other symptoms. Patient arrives on referral from [facility] for further evaluation of possible pneumonia. Patient states she went to their office this morning and was sent here due ~5 episodes of pneumonia in the past as well as her past medical history of CHF. Per EMS, patient had a negative chest ray and negative flu. She received a breathing treatment without relief. There are no other acute medical concerns at this time. PCP: PROVIDER UNKNOWN    Note written by kirk Sibley, as dictated by Lamar Noel MD 1:00 PM      The history is provided by the patient.         Past Medical History:   Diagnosis Date    Arthritis     Asthma     Bipolar disorder ([address]     GERD (gastroesophageal reflux disease)     Heart failure ([address]     Hypertension     Ill-defined condition      sarcoidosis    Morbid obesity ([address]     Obstructive sleep apnea     Pseudotumor cerebri     Psychiatric disorder     Sarcoidosis ([address]     Schizophrenia ([address]        Past Surgical History:   Procedure Laterality Date    Hx hernia repair       [facility] repair age 16         Family History:   Problem Relation Age of Onset    Hypertension Mother     Psychiatric Disorder Mother     Cancer Father        Social History     Social History    Marital status: SINGLE     Spouse name: N/A    Number of children: N/A    Years of education: N/A     Occupational History    Not on file. Social History Main Topics    Smoking status: Current Every Day Smoker     Packs/day: 0.25     Years: 23.00     Types: Cigarettes    Smokeless tobacco: Never Used    Alcohol use No    Drug use: No    Sexual activity: Yes     Other Topics Concern    Not on file     Social History Narrative         ALLERGIES: Morphine    Review of Systems   Constitutional: Positive for chills and fever. HENT: Positive for sore throat. Negative for rhinorrhea. Respiratory: Positive for cough. Negative for shortness of breath. Cardiovascular: Positive for chest pain. Gastrointestinal: Positive for abdominal pain. Negative for diarrhea, nausea and vomiting. Genitourinary: Negative for dysuria and urgency. Musculoskeletal: Positive for arthralgias and myalgias. Negative for back pain. Skin: Negative for rash. Neurological: Negative for dizziness, weakness and light-headedness. All other systems reviewed and are negative.       Vitals:    02/10/17 1251   BP: (!) 152/93   Pulse: 92   Resp: 20   Temp: (!) 100.5 °F (38.1 °C)   SpO2: 100%   Weight: 154.2 kg (340 lb)   Height: 5' 1\" (1.549 m)            Physical Exam   Const:  No acute distress, well developed, well nourished  Head:  Atraumatic, normocephalic  Eyes:  PERRL, conjunctiva normal, no scleral icterus  Neck:  Supple, trachea midline  Cardiovascular:  RRR, no murmurs, no gallops, no rubs  Resp:  No resp distress, no increased work of breathing, no wheezes, no rhonchi, no rales,  Abd:  Soft, non-tender, non-distended, no rebound, no guarding, no CVA tenderness  :  Deferred  MSK:  No pedal edema, normal ROM  Neuro:  Alert and oriented x3, no cranial nerve defect  Skin:  Warm to the touch, dry, intact  Psych: normal mood and affect, behavior is normal, judgement and thought content is normal    Note written by Hitesh Viramontes, kirk, as dictated by Madison Adhikari MD 1:02 PM    MDM  Number of Diagnoses or Management Options  Respiratory distress:   Sarcoidosis [facility]):      Amount and/or Complexity of Data Reviewed  Clinical lab tests: ordered and reviewed  Tests in the radiology section of CPT®: ordered and reviewed  Review and summarize past medical records: yes    Patient Progress  Patient progress: stable    ED Course     Pt. Presents to the ER with SOB. Pt. With a history of sarcoid. No pneumonia on xray. Pt. Becomes quite SOB with exertion. Her sats did not drop below the low 90s, but she reported feeling very SOB and had increased WOB. Pt. To be evaluated for admission by her [facility] hospitalist.      Procedures      EKG interpretation: (Preliminary)  Rhythm: normal sinus rhythm; and regular .  Rate (approx.): 87; Axis: normal; P wave: normal; QRS interval: normal ; ST/T wave: normal;  Other findings: normal.

## 2017-02-10 NOTE — PROGRESS NOTES
Initial Pulmonary / Critical Care    Assessment / Plan:      1. Acute on chronic resp failure - acute asthmatic bronchitis  2. H/o sarcoidosis - dx by lymph node bx in 2008 in [facility] - not on chronic steroids  3. Severe GEORGE on nocturnal CPAP  4. Obesity  5. CHF - diastolic dysfunction with previous EF 75%  6. Bipolar disorder    --agree with standard care for acute asthmatic bronchitis per primary team  --nocturnal cpap  --follow up with Dr Jose Block after discharge    Will have coverage see over the weekend if needed    History / Subjective:  Harsh Bentley is a 37 y.o.  female who  has a past medical history of Arthritis; Asthma; Bipolar disorder ([address] GERD (gastroesophageal reflux disease); Heart failure ([address] Hypertension; Ill-defined condition; Morbid obesity ([address] Obstructive sleep apnea; Pseudotumor cerebri; Psychiatric disorder; Sarcoidosis ([facility] [facility] and Schizophrenia ([facility] [address] [facility] admitted 2/10/2017 with several day h/o dry cough, wheezing and increased shortness of breath. Not improved with use of her home nebulizer. + chills. No sputum. No hemoptysis. No CP or LE edema. Has GEORGE (severe) on nocturnal cpap and is followed by Dr Jose Block. Allergies   Allergen Reactions    Morphine Itching and Swelling     Past Medical History   Diagnosis Date    Arthritis     Asthma     Bipolar disorder ([address]     GERD (gastroesophageal reflux disease)     Heart failure ([address]     Hypertension     Ill-defined condition      sarcoidosis    Morbid obesity ([facility] [facility]     Obstructive sleep apnea     Pseudotumor cerebri     Psychiatric disorder     Sarcoidosis ([facility] [address] [facility]     Schizophrenia ([facility] [address] [facility]       Past Surgical History   Procedure Laterality Date    Hx hernia repair       [facility] repair age 16      Prior to Admission medications    Medication Sig Start Date End Date Taking? Authorizing Provider   acyclovir (ZOVIRAX) 400 mg tablet Take 400 mg by mouth two (2) times a day.    Yes Historical Provider   fluticasone (FLONASE) 50 mcg/actuation nasal spray 2 Sprays by Both Nostrils route daily. Yes Historical Provider   gabapentin (NEURONTIN) 600 mg tablet Take 600 mg by mouth two (2) times a day. Yes Historical Provider   ipratropium (ATROVENT) 0.02 % nebulizer solution 0.5 mg by Nebulization route as needed for Wheezing. Yes Historical Provider   isosorbide mononitrate ER (IMDUR) 30 mg tablet Take 30 mg by mouth daily. Yes Historical Provider   lurasidone (LATUDA) 20 mg tab tablet Take 20 mg by mouth daily. Yes Historical Provider   lisinopril (PRINIVIL, ZESTRIL) 40 mg tablet Take 40 mg by mouth daily. Yes Historical Provider   meloxicam (MOBIC) 15 mg tablet Take 15 mg by mouth daily. Yes Historical Provider   metoprolol tartrate (LOPRESSOR) 25 mg tablet Take 25 mg by mouth two (2) times a day. Yes Historical Provider   omeprazole (PRILOSEC) 20 mg capsule Take 40 mg by mouth daily. Yes Historical Provider   spironolactone (ALDACTONE) 25 mg tablet Take 25 mg by mouth two (2) times a day. Yes Historical Provider   topiramate (TOPAMAX) 100 mg tablet Take 100 mg by mouth two (2) times a day. Yes Historical Provider   traMADol (ULTRAM) 50 mg tablet Take 50 mg by mouth every eight (8) hours as needed for Pain. Yes Historical Provider   hydrOXYzine pamoate (VISTARIL) 25 mg capsule Take 25 mg by mouth two (2) times a day. Yes Historical Provider   albuterol (PROVENTIL HFA, VENTOLIN HFA, PROAIR HFA) 90 mcg/actuation inhaler Take 2 Puffs by inhalation every four (4) hours as needed for Wheezing. 6/5/16  Yes Nancy Gay MD   furosemide (LASIX) 40 mg tablet Take 40 mg by mouth daily. Yes Radha Mann MD   atorvastatin (LIPITOR) 20 mg tablet Take 20 mg by mouth daily. Yes Radha Mann MD   albuterol sulfate 2.5 mg/0.5 mL Nebu 2.5 mg, ipratropium 0.02 % Soln 0.5 mg 1 Dose by Nebulization route two (2) times a day.    Yes Historical Provider      Family History   Problem Relation Age of Onset    Hypertension Mother     Psychiatric Disorder Mother     Cancer Father      Social History   Substance Use Topics    Smoking status: Current Every Day Smoker     Packs/day: 0.25     Years: 23.00     Types: Cigarettes    Smokeless tobacco: Never Used    Alcohol use No      ROS:  A comprehensive review of systems was negative except for that written in the HPI. Objective:  Patient Vitals for the past 4 hrs:   BP Temp Pulse Resp SpO2 Height Weight   02/10/17 1520 121/61 - (!) 109 15 99 % - -   02/10/17 1446 - - - - 96 % - -   02/10/17 1353 149/80 - 81 20 97 % - -   02/10/17 1251 (!) 152/93 (!) 100.5 °F (38.1 °C) 92 20 100 % 5' 1\" (1.549 m) 154.2 kg (340 lb)     Temp (24hrs), Av.5 °F (38.1 °C), Min:100.5 °F (38.1 °C), Max:100.5 °F (38.1 °C)    CVP:        No intake or output data in the 24 hours ending 02/10/17 1527  Blood Sugar:  Glucose   Date Value Ref Range Status   02/10/2017 94 65 - 100 mg/dL Final   2016 83 65 - 100 mg/dL Final   2016 90 65 - 100 mg/dL Final   2016 145 (H) 65 - 100 mg/dL Final   2016 142 (H) 65 - 100 mg/dL Final     Exam:  Stocky  No resp distress  No accessory use  Coarse rhonchi with exp wheezes  RRR  Soft  Warm and dry  No edema    Lab data was reviewed. Radiology images were independently viewed and available reports were reviewed.     CXR - no acute process, no asdz    Lab:  Recent Labs      02/10/17   1321   WBC  6.5   HGB  14.4   PLT  156   NA  134*   K  3.7   CL  108   CO2  15*   BUN  7   CREA  1.06*   GLU  94   CA  8.7   TROIQ  <0.04   TBILI  0.4   SGOT  14*         Abilio Hurley MD

## 2017-02-10 NOTE — ED TRIAGE NOTES
Triage: Pt arrives via EMS from [facility] with c/o generalized body aches, SOB, fever and cough since Sunday. Pt had negative xray and negative flu per [facility]. Given 1 neb treatment without relief. Pt arrives with dry hacking cough and SOB, CP and dizziness.  Temp 100.5 hx: CHF, sarcodosis, asthma

## 2017-02-11 VITALS
DIASTOLIC BLOOD PRESSURE: 89 MMHG | HEART RATE: 77 BPM | SYSTOLIC BLOOD PRESSURE: 127 MMHG | WEIGHT: 293 LBS | TEMPERATURE: 98.6 F | OXYGEN SATURATION: 99 % | BODY MASS INDEX: 55.32 KG/M2 | RESPIRATION RATE: 18 BRPM | HEIGHT: 61 IN

## 2017-02-11 LAB
ALBUMIN SERPL BCP-MCNC: 3.7 G/DL (ref 3.5–5)
ALBUMIN/GLOB SERPL: 0.9 {RATIO} (ref 1.1–2.2)
ALP SERPL-CCNC: 74 U/L (ref 45–117)
ALT SERPL-CCNC: 16 U/L (ref 12–78)
ANION GAP BLD CALC-SCNC: 12 MMOL/L (ref 5–15)
AST SERPL W P-5'-P-CCNC: 10 U/L (ref 15–37)
ATRIAL RATE: 87 BPM
BILIRUB SERPL-MCNC: 0.4 MG/DL (ref 0.2–1)
BUN SERPL-MCNC: 11 MG/DL (ref 6–20)
BUN/CREAT SERPL: 9 (ref 12–20)
CALCIUM SERPL-MCNC: 9.3 MG/DL (ref 8.5–10.1)
CALCULATED P AXIS, ECG09: 37 DEGREES
CALCULATED R AXIS, ECG10: 30 DEGREES
CALCULATED T AXIS, ECG11: 28 DEGREES
CHLORIDE SERPL-SCNC: 107 MMOL/L (ref 97–108)
CO2 SERPL-SCNC: 20 MMOL/L (ref 21–32)
CREAT SERPL-MCNC: 1.25 MG/DL (ref 0.55–1.02)
DIAGNOSIS, 93000: NORMAL
ERYTHROCYTE [DISTWIDTH] IN BLOOD BY AUTOMATED COUNT: 13.9 % (ref 11.5–14.5)
GLOBULIN SER CALC-MCNC: 4.2 G/DL (ref 2–4)
GLUCOSE SERPL-MCNC: 159 MG/DL (ref 65–100)
HCT VFR BLD AUTO: 43.8 % (ref 35–47)
HGB BLD-MCNC: 14.9 G/DL (ref 11.5–16)
MCH RBC QN AUTO: 30.4 PG (ref 26–34)
MCHC RBC AUTO-ENTMCNC: 34 G/DL (ref 30–36.5)
MCV RBC AUTO: 89.4 FL (ref 80–99)
P-R INTERVAL, ECG05: 138 MS
PLATELET # BLD AUTO: 231 K/UL (ref 150–400)
POTASSIUM SERPL-SCNC: 3.8 MMOL/L (ref 3.5–5.1)
PROT SERPL-MCNC: 7.9 G/DL (ref 6.4–8.2)
Q-T INTERVAL, ECG07: 372 MS
QRS DURATION, ECG06: 82 MS
QTC CALCULATION (BEZET), ECG08: 447 MS
RBC # BLD AUTO: 4.9 M/UL (ref 3.8–5.2)
SODIUM SERPL-SCNC: 139 MMOL/L (ref 136–145)
VENTRICULAR RATE, ECG03: 87 BPM
WBC # BLD AUTO: 4.2 K/UL (ref 3.6–11)

## 2017-02-11 PROCEDURE — 74011250637 HC RX REV CODE- 250/637: Performed by: INTERNAL MEDICINE

## 2017-02-11 PROCEDURE — 85027 COMPLETE CBC AUTOMATED: CPT | Performed by: INTERNAL MEDICINE

## 2017-02-11 PROCEDURE — 36415 COLL VENOUS BLD VENIPUNCTURE: CPT | Performed by: INTERNAL MEDICINE

## 2017-02-11 PROCEDURE — 80053 COMPREHEN METABOLIC PANEL: CPT | Performed by: INTERNAL MEDICINE

## 2017-02-11 PROCEDURE — 94640 AIRWAY INHALATION TREATMENT: CPT

## 2017-02-11 PROCEDURE — 74011250636 HC RX REV CODE- 250/636: Performed by: INTERNAL MEDICINE

## 2017-02-11 PROCEDURE — 77030012879 HC MSK CPAP FLL FAC PHIL -B

## 2017-02-11 PROCEDURE — 74011000250 HC RX REV CODE- 250: Performed by: INTERNAL MEDICINE

## 2017-02-11 RX ORDER — LEVOFLOXACIN 500 MG/1
500 TABLET, FILM COATED ORAL DAILY
Qty: 10 TAB | Refills: 0 | Status: SHIPPED | OUTPATIENT
Start: 2017-02-11 | End: 2017-11-30

## 2017-02-11 RX ORDER — HYDROCODONE POLISTIREX AND CHLORPHENIRAMINE POLISTIREX 10; 8 MG/5ML; MG/5ML
5 SUSPENSION, EXTENDED RELEASE ORAL
Qty: 100 ML | Refills: 0 | Status: SHIPPED | OUTPATIENT
Start: 2017-02-11 | End: 2017-11-30

## 2017-02-11 RX ADMIN — FLUTICASONE PROPIONATE 2 SPRAY: 50 SPRAY, METERED NASAL at 08:51

## 2017-02-11 RX ADMIN — METOPROLOL TARTRATE 25 MG: 25 TABLET ORAL at 08:46

## 2017-02-11 RX ADMIN — Medication 10 ML: at 06:34

## 2017-02-11 RX ADMIN — FUROSEMIDE 40 MG: 40 TABLET ORAL at 08:47

## 2017-02-11 RX ADMIN — ALBUTEROL SULFATE 1.25 MG: 2.5 SOLUTION RESPIRATORY (INHALATION) at 08:58

## 2017-02-11 RX ADMIN — METHYLPREDNISOLONE SODIUM SUCCINATE 60 MG: 125 INJECTION, POWDER, FOR SOLUTION INTRAMUSCULAR; INTRAVENOUS at 06:32

## 2017-02-11 RX ADMIN — IPRATROPIUM BROMIDE 0.5 MG: 0.5 SOLUTION RESPIRATORY (INHALATION) at 12:14

## 2017-02-11 RX ADMIN — LURASIDONE HYDROCHLORIDE 20 MG: 20 TABLET, FILM COATED ORAL at 08:46

## 2017-02-11 RX ADMIN — MELOXICAM 15 MG: 7.5 TABLET ORAL at 08:47

## 2017-02-11 RX ADMIN — METHYLPREDNISOLONE SODIUM SUCCINATE 60 MG: 125 INJECTION, POWDER, FOR SOLUTION INTRAMUSCULAR; INTRAVENOUS at 11:46

## 2017-02-11 RX ADMIN — ALBUTEROL SULFATE 1.25 MG: 2.5 SOLUTION RESPIRATORY (INHALATION) at 12:14

## 2017-02-11 RX ADMIN — ATORVASTATIN CALCIUM 40 MG: 40 TABLET, FILM COATED ORAL at 08:47

## 2017-02-11 RX ADMIN — SPIRONOLACTONE 25 MG: 25 TABLET, FILM COATED ORAL at 08:46

## 2017-02-11 RX ADMIN — GABAPENTIN 600 MG: 600 TABLET, FILM COATED ORAL at 08:46

## 2017-02-11 RX ADMIN — IPRATROPIUM BROMIDE 0.5 MG: 0.5 SOLUTION RESPIRATORY (INHALATION) at 08:58

## 2017-02-11 RX ADMIN — HYDROCODONE POLISTIREX AND CHLORPHENIRAMINE POLISTIREX 5 ML: 10; 8 SUSPENSION, EXTENDED RELEASE ORAL at 10:17

## 2017-02-11 RX ADMIN — TOPIRAMATE 100 MG: 100 TABLET, FILM COATED ORAL at 08:46

## 2017-02-11 RX ADMIN — ACETAMINOPHEN 650 MG: 160 SOLUTION ORAL at 10:17

## 2017-02-11 RX ADMIN — LISINOPRIL 40 MG: 20 TABLET ORAL at 08:47

## 2017-02-11 RX ADMIN — PANTOPRAZOLE SODIUM 40 MG: 40 TABLET, DELAYED RELEASE ORAL at 06:32

## 2017-02-11 RX ADMIN — ISOSORBIDE MONONITRATE 30 MG: 30 TABLET ORAL at 08:51

## 2017-02-11 RX ADMIN — HYDROXYZINE HYDROCHLORIDE 25 MG: 25 TABLET, FILM COATED ORAL at 08:48

## 2017-02-11 NOTE — PROGRESS NOTES
Bedside shift change report given to [place] Alpers (oncoming nurse) by [facility] (offgoing nurse). Report included the following information SBAR, ED Summary, Intake/Output, Recent Results and Cardiac Rhythm NSR.

## 2017-02-11 NOTE — PROGRESS NOTES
2000    Pt has fever of 101.8 post tylenol administration at 1800. Put cool wash cloths on neck and under armpits. Will continue to monitor temperature. 2310    Pt afebrile with a temp of 98.5, c/o generalized aches and pains.

## 2017-02-11 NOTE — PROGRESS NOTES
Problem: Falls - Risk of  Goal: *Absence of falls  Outcome: Progressing Towards Goal  Pt gets up with minimal assistance and has steady gait. Wears slip proof socks at all times.  Bed in lowest position with call light in reach

## 2017-02-11 NOTE — PROGRESS NOTES
Patient discharged home via medical transport. IV removed. Discharge instructions reviewed with patient. Patient verbalized understanding of all. No s/s of distress noted.

## 2017-02-11 NOTE — DISCHARGE INSTRUCTIONS
DISCHARGE SUMMARY from Nurse    The following personal items are in your possession at time of discharge:    Dental Appliances: None  Visual Aid: None     Home Medications: None  Jewelry: None  Clothing: At bedside  Other Valuables: None         The discharge information has been reviewed with the patient. The patient verbalized understanding. Discharge medications reviewed with the patient and appropriate educational materials and side effects teaching were provided.

## 2017-02-11 NOTE — DISCHARGE SUMMARY
[facility]  Eneida Parker D.O.  [phone]    Discharge Summary     PATIENT ID: Amada Rubio  MRN: [id number]   YOB: 1975    DATE OF ADMISSION: 2/10/2017 12:43 PM    DATE OF DISCHARGE: 2/11/17  PRIMARY CARE PROVIDER: PROVIDER UNKNOWN       DISCHARGING PHYSICIAN: Darylene New, DO    To contact this individual call [phone] and ask the  to page. If unavailable ask to be transferred the Adult Hospitalist Department. CONSULTATIONS: IP CONSULT TO FAMILY PRACTICE    PROCEDURES/SURGERIES: * No surgery found *    [id number] [facility] COURSE:   The patient is a 46-year-old   female who presented originally to the [place] office after a history   since Sunday of having worsening shortness of breath, cough, fevers,   malaise, arthralgias and chills at home. The patient when asked why   she did not come into the office earlier, she reported that she thought   this was simply a cold that she would get over, but unfortunately her   symptoms continued to worsen to the point that she was having   difficulty breathing last night. She, therefore, came into the [facility]   office for evaluation today. Although her chest x-ray was unremarkable,   she has a long-standing history of pneumonia, as well as COPD and   sarcoidosis. The patient had a flu test as well, which came back   negative in the office and it was felt that given her symptoms, she   could either be treated in the office or come into the emergency room. The patient had reported that she did not want to be treated in the   office, but instead was very concerned and wanted to be transferred to   the ER. She was noted to have a low-grade temperature of \"105\", but   saturations of 100% in the emergency room, however, she continued   to complain of chest pain and shortness of breath.  It was felt that she   would need to come in for further evaluation and workup. Once patient started on cough suppressant she rapidly improved, discussed likely viral syncope with possible secondary bacterial bronchitis likely. Patient resistant to wearing home CPAP due to cough but advised that if she is sleeping she needs to wear her CPAP. Discussed ways to suppress cough with oral cough drops and liquid tylenol but reprots she can only get scripts from pharmacy can not afford OTC meds. Wants cough medicine with hydrocodone as this seems to help discussed that she would likely have a copay for this but reports her insurance will cover this. As she is symptomatically improved will discharge today. DISCHARGE DIAGNOSES / PLAN:      Respiratory distress, likely multifactorial secondary to obstructive sleep apnea as well as possible underlying viral syndrome and mild chronic obstructive pulmonary disease   exacerbation. I have asked Pulmonary to evaluate as she does follow   pulmonary on an outpatient basis. We will start her current nebulizers   as well as steroids, continue her CPAP at night. We will give Levaquin   for now for chronic obstructive pulmonary disease exacerbation and possible secondary bacterial infection. We will continue her Lasix for diuresis and monitor her   kidney function. Cough suppressant medications given.         PENDING TEST RESULTS:   At the time of discharge the following test results are still pending: none    FOLLOW UP APPOINTMENTS:    Follow-up Information     Follow up With Details Comments Contact Info    Provider Unknown   Patient not available to ask             ADDITIONAL CARE RECOMMENDATIONS: follow up with Shahab Cano in 1-2 days    DIET: Diabetic Diet    ACTIVITY: Activity as tolerated    WOUND CARE: none    EQUIPMENT needed: none      DISCHARGE MEDICATIONS:  Current Discharge Medication List      START taking these medications    Details   acetaminophen (TYLENOL) 32MG/ML soln solution Take 20.3 mL by mouth every six (6) hours as needed. Qty: 500 mL, Refills: 0      chlorpheniramine-HYDROcodone (TUSSIONEX) 10-8 mg/5 mL suspension Take 5 mL by mouth every twelve (12) hours as needed for Cough. Max Daily Amount: 10 mL. Qty: 100 mL, Refills: 0      levoFLOXacin (LEVAQUIN) 500 mg tablet Take 1 Tab by mouth daily. Qty: 10 Tab, Refills: 0         CONTINUE these medications which have NOT CHANGED    Details   acyclovir (ZOVIRAX) 400 mg tablet Take 400 mg by mouth two (2) times a day. fluticasone (FLONASE) 50 mcg/actuation nasal spray 2 Sprays by Both Nostrils route daily. gabapentin (NEURONTIN) 600 mg tablet Take 600 mg by mouth two (2) times a day. ipratropium (ATROVENT) 0.02 % nebulizer solution 0.5 mg by Nebulization route as needed for Wheezing. isosorbide mononitrate ER (IMDUR) 30 mg tablet Take 30 mg by mouth daily. lurasidone (LATUDA) 20 mg tab tablet Take 20 mg by mouth daily. lisinopril (PRINIVIL, ZESTRIL) 40 mg tablet Take 40 mg by mouth daily. meloxicam (MOBIC) 15 mg tablet Take 15 mg by mouth daily. metoprolol tartrate (LOPRESSOR) 25 mg tablet Take 25 mg by mouth two (2) times a day. omeprazole (PRILOSEC) 20 mg capsule Take 40 mg by mouth daily. spironolactone (ALDACTONE) 25 mg tablet Take 25 mg by mouth two (2) times a day. topiramate (TOPAMAX) 100 mg tablet Take 100 mg by mouth two (2) times a day. traMADol (ULTRAM) 50 mg tablet Take 50 mg by mouth every eight (8) hours as needed for Pain.      hydrOXYzine pamoate (VISTARIL) 25 mg capsule Take 25 mg by mouth two (2) times a day. albuterol (PROVENTIL HFA, VENTOLIN HFA, PROAIR HFA) 90 mcg/actuation inhaler Take 2 Puffs by inhalation every four (4) hours as needed for Wheezing. Qty: 1 Inhaler, Refills: 1      furosemide (LASIX) 40 mg tablet Take 40 mg by mouth daily. atorvastatin (LIPITOR) 20 mg tablet Take 40 mg by mouth daily.       albuterol sulfate 2.5 mg/0.5 mL Nebu 2.5 mg, ipratropium 0.02 % Soln 0.5 mg 1 Dose by Nebulization route two (2) times a day. NOTIFY YOUR PHYSICIAN FOR ANY OF THE FOLLOWING:   Fever over 101 degrees for 24 hours. Chest pain, shortness of breath, fever, chills, nausea, vomiting, diarrhea, change in mentation, falling, weakness, bleeding. Severe pain or pain not relieved by medications. Or, any other signs or symptoms that you may have questions about. DISPOSITION:  x  Home With: NO NEED FOR HOME HEALTH   OT  PT  HH  RN       Long term SNF/Inpatient Rehab    Independent/assisted living    Hospice    Other:       PATIENT CONDITION AT DISCHARGE:     Functional status    Poor     Deconditioned    x Independent      Cognition   x  Lucid     Forgetful     Dementia      Catheters/lines (plus indication)    Bennett     PICC     PEG    x None      Code status   x  Full code     DNR      PHYSICAL EXAMINATION AT DISCHARGE:  GENERAL: The patient is awake, alert. She is oriented to person,   place and time, not in acute distress. HEENT: Normocephalic, atraumatic. Pupils are round and reactive to   light. Extraocular muscles are intact. NECK: Supple, no JVD, no carotid bruit. CARDIOVASCULAR: Regular S1, S2, no murmurs, gallops, or rubs   appreciated. LUNGS: Clear to auscultation bilaterally with mild end-expiratory   wheeze but otherwise, no rhonchi, no rales, no retractions, no   increased work of breathing noted. ABDOMEN: Obese. Soft, nontender, nondistended, positive bowel   sounds. EXTREMITIES: No clubbing, cyanosis or edema is noted. NEUROLOGIC: The patient is awake, alert, oriented. Speech,   language, memory is intact. Cranial nerves 2 through 12 are grossly   intact. No focal neurologic deficits noted. PSYCHIATRIC: Mood and affect are appropriate.       CHRONIC MEDICAL DIAGNOSES:  Problem List as of 2/11/2017  Date Reviewed: 5/21/2014          Codes Class Noted - Resolved    COPD (chronic obstructive pulmonary disease) with acute bronchitis ([facility] ICD-10-CM: J44.0  ICD-9-CM: 491.22  2/10/2017 - Present        Respiratory failure with hypoxia and hypercapnia [facility]) ICD-10-CM: J96.91, J96.92  ICD-9-CM: 518.81  1/3/2016 - Present        Morbid obesity (HCC) ICD-10-CM: E66.01  ICD-9-CM: 278.01  Unknown - Present        Asthma ICD-10-CM: J45.909  ICD-9-CM: 493.90  Unknown - Present        Obstructive sleep apnea ICD-10-CM: G47.33  ICD-9-CM: 327.23  Unknown - Present        GERD (gastroesophageal reflux disease) ICD-10-CM: K21.9  ICD-9-CM: 530.81  Unknown - Present        Arthritis ICD-10-CM: M19.90  ICD-9-CM: 716.90  Unknown - Present        Sarcoidosis ([facility] ICD-10-CM: D86.9  ICD-9-CM: 135  Unknown - Present        Hypertension ICD-10-CM: I10  ICD-9-CM: 401.9  Unknown - Present        Bipolar disorder ([facility] ICD-10-CM: F31.9  ICD-9-CM: 296.80  Unknown - Present        RESOLVED: Respiratory failure with hypoxia ([facility] ICD-10-CM: J96.91  ICD-9-CM: 518.81  11/5/2015 - 1/3/2016        RESOLVED: Obesity ICD-10-CM: K20.2  ICD-9-CM: 278.00  Unknown - 1/3/2016              Greater than 25 minutes were spent with the patient on counseling and coordination of care    Signed:   Leland Rivero DO  2/11/2017  12:08 PM

## 2017-02-11 NOTE — PROGRESS NOTES
02/10/17 2003   Vitals   Temp (!) 101.8 °F (38.8 °C)   Temp Source Oral   Pulse (Heart Rate) 92   Heart Rate Source Monitor   Resp Rate 19   Level of Consciousness Alert   /84   MAP (Calculated) 108   BP 1 Location Right arm   BP 1 Method Automatic   BP Patient Position At rest   MEWS Score 3   Tylenol given and trying cold compresses

## 2017-02-16 LAB
BACTERIA SPEC CULT: NORMAL
BACTERIA SPEC CULT: NORMAL
SERVICE CMNT-IMP: NORMAL
SERVICE CMNT-IMP: NORMAL

## 2017-11-22 ENCOUNTER — DOCUMENTATION ONLY (OUTPATIENT)
Dept: PULMONOLOGY | Age: 42
End: 2017-11-22

## 2017-11-30 ENCOUNTER — OFFICE VISIT (OUTPATIENT)
Dept: OBGYN CLINIC | Age: 42
End: 2017-11-30

## 2017-11-30 VITALS
SYSTOLIC BLOOD PRESSURE: 140 MMHG | RESPIRATION RATE: 19 BRPM | HEIGHT: 61 IN | BODY MASS INDEX: 55.13 KG/M2 | WEIGHT: 292 LBS | DIASTOLIC BLOOD PRESSURE: 86 MMHG

## 2017-11-30 DIAGNOSIS — Z01.419 WELL FEMALE EXAM WITH ROUTINE GYNECOLOGICAL EXAM: Primary | ICD-10-CM

## 2017-11-30 DIAGNOSIS — Z11.51 SPECIAL SCREENING EXAMINATION FOR HUMAN PAPILLOMAVIRUS (HPV): ICD-10-CM

## 2017-11-30 RX ORDER — PREGABALIN 100 MG/1
CAPSULE ORAL 2 TIMES DAILY
COMMUNITY
End: 2018-11-08 | Stop reason: SDUPTHER

## 2017-11-30 RX ORDER — TRIAMCINOLONE ACETONIDE 1 MG/G
OINTMENT TOPICAL 2 TIMES DAILY
COMMUNITY
End: 2018-12-07

## 2017-11-30 RX ORDER — DIVALPROEX SODIUM 500 MG/1
TABLET, DELAYED RELEASE ORAL 3 TIMES DAILY
COMMUNITY
End: 2019-06-19

## 2017-11-30 NOTE — PROGRESS NOTES
Pete Rivera is a ,  40 y.o. female [address] whose LMP was on 2017 who presents for her annual checkup. She is having significant missed menses after weight loss. it has since returned. Menstrual status:    Her periods are moderate in flow. She is using three to five pads or tampons per day. She denies dysmenorrhea. She reports no premenstrual symptoms. The patient is not using HRT. Contraception:    The current method of family planning is condoms sometimes. Sexual history:    She  reports that she currently engages in sexual activity. Medical conditions:    Since her last annual GYN exam about two years ago, she has had the following changes in her health history: none. Pap and Mammogram History:    Her most recent Pap smear was normal obtained 2 year(s) ago. The patient has never had a mammogram.    Breast Cancer History/Substance Abuse:    She has no family history of breast cancer. Osteoporosis History:    Family history does not include a first or second degree relative with osteopenia or osteoporosis. A bone density scan has not been previously obtained. Past Medical History:   Diagnosis Date    Arthritis     Asthma     Bipolar disorder ([address]     Cardiomyopathy ([address]     Chronic pain     Depression     GERD (gastroesophageal reflux disease)     Heart failure (HCC)     Herpes     Hypertension     Kidney disease     Morbid obesity ([address]     Obstructive sleep apnea     Pseudotumor cerebri     Sarcoidosis     Schizophrenia (HCC)      Past Surgical History:   Procedure Laterality Date    HX HERNIA REPAIR      [facility] repair age 16     Current Outpatient Prescriptions   Medication Sig Dispense Refill    divalproex DR (DEPAKOTE) 500 mg tablet Take  by mouth three (3) times daily.  loratadine 10 mg cap Take  by mouth.  pregabalin (LYRICA) 100 mg capsule Take  by mouth two (2) times a day.       triamcinolone acetonide (KENALOG) 0.1 % ointment Apply  to affected area two (2) times a day. use thin layer      acyclovir (ZOVIRAX) 400 mg tablet Take 400 mg by mouth two (2) times a day.  fluticasone (FLONASE) 50 mcg/actuation nasal spray 2 Sprays by Both Nostrils route daily.  ipratropium (ATROVENT) 0.02 % nebulizer solution 0.5 mg by Nebulization route as needed for Wheezing.  isosorbide mononitrate ER (IMDUR) 30 mg tablet Take 30 mg by mouth daily.  lurasidone (LATUDA) 20 mg tab tablet Take 20 mg by mouth daily.  lisinopril (PRINIVIL, ZESTRIL) 40 mg tablet Take 40 mg by mouth daily.  metoprolol tartrate (LOPRESSOR) 25 mg tablet Take 25 mg by mouth two (2) times a day.  spironolactone (ALDACTONE) 25 mg tablet Take 25 mg by mouth two (2) times a day.  topiramate (TOPAMAX) 100 mg tablet Take 100 mg by mouth two (2) times a day.  hydrOXYzine pamoate (VISTARIL) 25 mg capsule Take 25 mg by mouth two (2) times a day.  albuterol (PROVENTIL HFA, VENTOLIN HFA, PROAIR HFA) 90 mcg/actuation inhaler Take 2 Puffs by inhalation every four (4) hours as needed for Wheezing. 1 Inhaler 1    furosemide (LASIX) 40 mg tablet Take 40 mg by mouth daily.  atorvastatin (LIPITOR) 20 mg tablet Take 40 mg by mouth daily.  acetaminophen (TYLENOL) 32MG/ML soln solution Take 20.3 mL by mouth every six (6) hours as needed. 500 mL 0    chlorpheniramine-HYDROcodone (TUSSIONEX) 10-8 mg/5 mL suspension Take 5 mL by mouth every twelve (12) hours as needed for Cough. Max Daily Amount: 10 mL. 100 mL 0    levoFLOXacin (LEVAQUIN) 500 mg tablet Take 1 Tab by mouth daily. 10 Tab 0    gabapentin (NEURONTIN) 600 mg tablet Take 600 mg by mouth two (2) times a day.  meloxicam (MOBIC) 15 mg tablet Take 15 mg by mouth daily.  omeprazole (PRILOSEC) 20 mg capsule Take 40 mg by mouth daily.  traMADol (ULTRAM) 50 mg tablet Take 50 mg by mouth every eight (8) hours as needed for Pain.       albuterol sulfate 2.5 mg/0.5 mL Nebu 2.5 mg, ipratropium 0.02 % Soln 0.5 mg 1 Dose by Nebulization route two (2) times a day. Allergies: Morphine   Social History     Social History    Marital status:      Spouse name: N/A    Number of children: N/A    Years of education: N/A     Occupational History    Not on file. Social History Main Topics    Smoking status: Current Every Day Smoker     Packs/day: 0.25     Years: 23.00     Types: Cigarettes    Smokeless tobacco: Never Used    Alcohol use No    Drug use: No    Sexual activity: Yes     Other Topics Concern    Not on file     Social History Narrative     Tobacco History:  reports that she has been smoking Cigarettes. She has a 5.75 pack-year smoking history. She has never used smokeless tobacco.  Alcohol Abuse:  reports that she does not drink alcohol. Drug Abuse:  reports that she does not use illicit drugs.   Patient Active Problem List   Diagnosis Code    Sarcoidosis D86.9    Hypertension I10    Bipolar disorder ([facility] F31.9    Asthma J45.909    Obstructive sleep apnea G47.33    GERD (gastroesophageal reflux disease) K21.9    Arthritis M19.90    Morbid obesity ([facility]) E66.01    Respiratory failure with hypoxia and hypercapnia ([facility]) J96.91, J96.92    COPD (chronic obstructive pulmonary disease) with acute bronchitis ([facility]) J44.0, J20.9         Review of Systems - History obtained from the patient  Constitutional: negative for weight loss, fever, night sweats  HEENT: negative for hearing loss, earache, congestion, snoring, sorethroat  CV: negative for chest pain, palpitations, edema  Resp: negative for cough, shortness of breath, wheezing  GI: negative for change in bowel habits, abdominal pain, black or bloody stools  : negative for frequency, dysuria, hematuria, vaginal discharge  MSK: negative for back pain, joint pain, muscle pain  Breast: negative for breast lumps, nipple discharge, galactorrhea  Skin :negative for itching, rash, hives  Neuro: negative for dizziness, headache, confusion, weakness  Psych: negative for anxiety, depression, change in mood  Heme/lymph: negative for bleeding, bruising, pallor    Physical Exam    Visit Vitals    /86 (BP 1 Location: Left arm, BP Patient Position: Sitting)    Resp 19    Ht 5' 1\" (1.549 m)    Wt 292 lb (132.5 kg)    LMP 11/26/2017 (Exact Date)    BMI 55.17 kg/m2     Constitutional  · Appearance: well-nourished, well developed, alert, in no acute distress    HENT  · Head and Face: appears normal    Neck  · Inspection/Palpation: normal appearance, no masses or tenderness  Lymph Nodes: no lymphadenopathy present    Chest  · Respiratory Effort: breathing normal    Breasts  · Inspection of Breasts: breasts symmetrical, no skin changes, no discharge present, nipple appearance normal, no skin retraction present  · Palpation of Breasts and Axillae: no masses present on palpation, no breast tenderness  · Axillary Lymph Nodes: no lymphadenopathy present    Gastrointestinal  · Abdominal Examination: abdomen non-tender to palpation, normal bowel sounds, no masses present  · Liver and spleen: no hepatomegaly present, spleen not palpable  · Hernias: no hernias identified    Skin  · General Inspection: no rash, no lesions identified    Neurologic/Psychiatric  · Mental Status:  · Orientation: grossly oriented to person, place and time  · Mood and Affect: mood normal, affect appropriate    Genitourinary  · External Genitalia: normal appearance for age, no discharge present, no tenderness present, no inflammatory lesions present, no masses present, no atrophy present  · Vagina: normal vaginal vault without central or paravaginal defects, dark bloody discharge present, no inflammatory lesions present, no masses present  · Bladder: non-tender to palpation  · Urethra: appears normal  · Cervix: normal   · Uterus: normal size, shape and consistency  · Adnexa: no adnexal tenderness present, no adnexal masses present  · Perineum: perineum within normal limits, no evidence of trauma, no rashes or skin lesions present  · Anus: anus within normal limits, no hemorrhoids present  · Inguinal Lymph Nodes: no lymphadenopathy present    Assessment:  Routine gynecologic examination  Her current medical status is satisfactory with no evidence of significant gynecologic issues.     Plan:  Counseled re: diet, exercise, healthy lifestyle  Return for yearly wellness visits  Rec annual mammogram, she states that she will get her mammogram through her PCP  Patient Verbalized understanding

## 2017-12-03 LAB
CYTOLOGIST CVX/VAG CYTO: NORMAL
CYTOLOGY CVX/VAG DOC THIN PREP: NORMAL
DX ICD CODE: NORMAL
HPV I/H RISK 1 DNA CVX QL PROBE+SIG AMP: NEGATIVE
Lab: NORMAL
OTHER STN SPEC: NORMAL
PATH REPORT.FINAL DX SPEC: NORMAL
STAT OF ADQ CVX/VAG CYTO-IMP: NORMAL

## 2018-04-24 ENCOUNTER — HOSPITAL ENCOUNTER (EMERGENCY)
Age: 43
Discharge: HOME | End: 2018-04-24
Attending: EMERGENCY MEDICINE | Admitting: EMERGENCY MEDICINE
Payer: MEDICARE

## 2018-04-24 VITALS
HEIGHT: 61 IN | RESPIRATION RATE: 18 BRPM | DIASTOLIC BLOOD PRESSURE: 76 MMHG | HEART RATE: 57 BPM | WEIGHT: 272 LBS | SYSTOLIC BLOOD PRESSURE: 135 MMHG | BODY MASS INDEX: 51.35 KG/M2 | OXYGEN SATURATION: 100 % | TEMPERATURE: 97.3 F

## 2018-04-24 DIAGNOSIS — L20.89 FLEXURAL ATOPIC DERMATITIS: ICD-10-CM

## 2018-04-24 DIAGNOSIS — L29.9 GENERALIZED PRURITUS: Primary | ICD-10-CM

## 2018-04-24 PROCEDURE — 74011250637 HC RX REV CODE- 250/637: Performed by: PHYSICIAN ASSISTANT

## 2018-04-24 PROCEDURE — 99283 EMERGENCY DEPT VISIT LOW MDM: CPT

## 2018-04-24 RX ORDER — HYDROXYZINE 50 MG/1
50 TABLET, FILM COATED ORAL
Qty: 20 TAB | Refills: 0 | Status: SHIPPED | OUTPATIENT
Start: 2018-04-24 | End: 2018-05-04

## 2018-04-24 RX ORDER — HYDROXYZINE HYDROCHLORIDE 10 MG/1
10 TABLET, FILM COATED ORAL
Status: COMPLETED | OUTPATIENT
Start: 2018-04-24 | End: 2018-04-24

## 2018-04-24 RX ORDER — HYDROXYZINE 50 MG/1
50 TABLET, FILM COATED ORAL
Qty: 20 TAB | Refills: 0 | Status: SHIPPED | OUTPATIENT
Start: 2018-04-24 | End: 2018-04-24

## 2018-04-24 RX ORDER — TRIAMCINOLONE ACETONIDE 1 MG/ML
LOTION TOPICAL 3 TIMES DAILY
Qty: 60 ML | Refills: 0 | Status: SHIPPED | OUTPATIENT
Start: 2018-04-24 | End: 2018-12-07

## 2018-04-24 RX ORDER — TRIAMCINOLONE ACETONIDE 1 MG/ML
LOTION TOPICAL 3 TIMES DAILY
Qty: 60 ML | Refills: 0 | Status: SHIPPED | OUTPATIENT
Start: 2018-04-24 | End: 2018-04-24

## 2018-04-24 RX ORDER — DIPHENHYDRAMINE HCL 25 MG
50 CAPSULE ORAL
Qty: 10 CAP | Refills: 0 | Status: SHIPPED | OUTPATIENT
Start: 2018-04-24 | End: 2018-04-24

## 2018-04-24 RX ORDER — DIPHENHYDRAMINE HCL 25 MG
50 CAPSULE ORAL
Qty: 10 CAP | Refills: 0 | Status: SHIPPED | OUTPATIENT
Start: 2018-04-24 | End: 2018-12-07

## 2018-04-24 RX ADMIN — HYDROXYZINE HYDROCHLORIDE 10 MG: 10 TABLET ORAL at 16:20

## 2018-04-24 NOTE — DISCHARGE INSTRUCTIONS
Atopic Dermatitis: Care Instructions  Your Care Instructions  Atopic dermatitis (also called eczema) is a skin problem that causes intense itching and a red, raised rash. In severe cases, the rash develops clear fluid-filled blisters. The rash is not contagious. People with this condition seem to have very sensitive immune systems that are likely to react to things that cause allergies. The immune system is the body's way of fighting infection. There is no cure for atopic dermatitis, but you may be able to control it with care at home. Follow-up care is a key part of your treatment and safety. Be sure to make and go to all appointments, and call your doctor if you are having problems. It's also a good idea to know your test results and keep a list of the medicines you take. How can you care for yourself at home? · Use moisturizer at least twice a day. · If your doctor prescribes a cream, use it as directed. If your doctor prescribes other medicine, take it exactly as directed. · Wash the affected area with water only. Soap can make dryness and itching worse. Pat dry. · Apply a moisturizer after bathing. Use a cream such as Lubriderm, Moisturel, or Cetaphil that does not irritate the skin or cause a rash. Apply the cream while your skin is still damp after lightly drying with a towel. · Use cold, wet cloths to reduce itching. · Keep cool, and stay out of the sun. · If itching affects your normal activities, an over-the-counter antihistamine, such as diphenhydramine (Benadryl) or loratadine (Claritin) may help. Read and follow all instructions on the label. When should you call for help? Call your doctor now or seek immediate medical care if:  ? · Your rash gets worse and you have a fever. ? · You have new blisters or bruises, or the rash spreads and looks like a sunburn. ? · You have signs of infection, such as:  ¨ Increased pain, swelling, warmth, or redness.   ¨ Red streaks leading from the rash.  ¨ Pus draining from the rash. ¨ A fever. ? · You have crusting or oozing sores. ? · You have joint aches or body aches along with your rash. ? Watch closely for changes in your health, and be sure to contact your doctor if:  ? · Your rash does not clear up after 2 to 3 weeks of home treatment. ? · Itching interferes with your sleep or daily activities. Where can you learn more? Go to http://zenobia-pedro.info/. Enter Z279 in the search box to learn more about \"Atopic Dermatitis: Care Instructions. \"  Current as of: October 13, 2016  Content Version: 11.4  © 3907-2292 [employer]. Care instructions adapted under license by [employer] (which disclaims liability or warranty for this information). If you have questions about a medical condition or this instruction, always ask your healthcare professional. [place] [address] any warranty or liability for your use of this information.

## 2018-04-24 NOTE — ED NOTES
Patient discharged to home at this time with self. Patient provided with written instructions and 0 written prescriptions. All questions answered.

## 2018-04-24 NOTE — ED PROVIDER NOTES
EMERGENCY DEPARTMENT HISTORY AND PHYSICAL EXAM    Date: 4/24/2018  Patient Name: Tristin Saha    History of Presenting Illness     Chief Complaint   Patient presents with    Hair/Scalp Problem     hair falling out after removing ashley, states she has a rash to scalp.  Rash     to body         History Provided By: Patient      HPI: Tristin Saha is a 40 y.o. female with a PMH of HTN, sarcoidosis, bipolar disorder, herpes, cardiomyopathy, chronic pain, kidney disease, GERD, GEORGE, asthma, arthritis, pseudotumor cerebri, obesity, heart failure, schziophrenia who presents with acute moderate generalized pruritus and intermittent hair loss x 2 days. Endorses concern for lice after being told a friend had lice. endorses generalized itching all over body and \"chunks of hair\" coming out when she took her hair piece out the other day. Itchy, dry rash to posterior neck similar to atopic dermatitis in the past. Topical alcohol and grease to hair no relief. Denies fever, chills, n/v, abd pain, headache, vision changes, lightheadedness, dizziness, sore throat, ear pain, bed bugs or scabies in household, cp, sob, wheezing, dyspnea, throat swelling. Pt also endorses recently discharged from psychiatric hospital. Denies SI/HI/hallucinations. Has good follow up with PCP per pt. PCP: PROVIDER UNKNOWN    Current Facility-Administered Medications   Medication Dose Route Frequency Provider Last Rate Last Dose    hydrOXYzine HCl (ATARAX) tablet 10 mg  10 mg Oral NOW Megan Bangura PA-C         Current Outpatient Prescriptions   Medication Sig Dispense Refill    triamcinolone (KENALOG) 0.1 % lotion Apply  to affected area three (3) times daily. use thin layer 60 mL 0    diphenhydrAMINE (BENADRYL) 25 mg capsule Take 2 Caps by mouth every six (6) hours as needed. 10 Cap 0    divalproex DR (DEPAKOTE) 500 mg tablet Take  by mouth three (3) times daily.  loratadine 10 mg cap Take  by mouth.       pregabalin (LYRICA) 100 mg capsule Take  by mouth two (2) times a day.  triamcinolone acetonide (KENALOG) 0.1 % ointment Apply  to affected area two (2) times a day. use thin layer      acyclovir (ZOVIRAX) 400 mg tablet Take 400 mg by mouth two (2) times a day.  fluticasone (FLONASE) 50 mcg/actuation nasal spray 2 Sprays by Both Nostrils route daily.  ipratropium (ATROVENT) 0.02 % nebulizer solution 0.5 mg by Nebulization route as needed for Wheezing.  isosorbide mononitrate ER (IMDUR) 30 mg tablet Take 30 mg by mouth daily.  lurasidone (LATUDA) 20 mg tab tablet Take 20 mg by mouth daily.  lisinopril (PRINIVIL, ZESTRIL) 40 mg tablet Take 40 mg by mouth daily.  metoprolol tartrate (LOPRESSOR) 25 mg tablet Take 25 mg by mouth two (2) times a day.  omeprazole (PRILOSEC) 20 mg capsule Take 40 mg by mouth daily.  spironolactone (ALDACTONE) 25 mg tablet Take 25 mg by mouth two (2) times a day.  topiramate (TOPAMAX) 100 mg tablet Take 100 mg by mouth two (2) times a day.  hydrOXYzine pamoate (VISTARIL) 25 mg capsule Take 25 mg by mouth two (2) times a day.  albuterol (PROVENTIL HFA, VENTOLIN HFA, PROAIR HFA) 90 mcg/actuation inhaler Take 2 Puffs by inhalation every four (4) hours as needed for Wheezing. 1 Inhaler 1    furosemide (LASIX) 40 mg tablet Take 40 mg by mouth daily.  atorvastatin (LIPITOR) 20 mg tablet Take 40 mg by mouth daily.          Past History     Past Medical History:  Past Medical History:   Diagnosis Date    Arthritis     Asthma     Bipolar disorder ([address]     Cardiomyopathy ([address]     Chronic pain     Depression     GERD (gastroesophageal reflux disease)     Heart failure (HCC)     Herpes     Hypertension     Kidney disease     Morbid obesity ([address]     Obstructive sleep apnea     Pseudotumor cerebri     Sarcoidosis     Sch[facility]ia [facility]        Past Surgical History:  Past Surgical History:   Procedure Laterality Date    HX HERNIA REPAIR      [facility] repair age 16       Family History:  Family History   Problem Relation Age of Onset    Hypertension Mother     Psychiatric Disorder Mother     Cancer Father        Social History:  Social History   Substance Use Topics    Smoking status: Current Every Day Smoker     Packs/day: 0.25     Years: 23.00     Types: Cigarettes    Smokeless tobacco: Never Used    Alcohol use No       Allergies: Allergies   Allergen Reactions    Morphine Itching and Swelling         Review of Systems   Review of Systems   Constitutional: Negative. Negative for activity change, chills, fatigue and fever. HENT: Negative. Eyes: Negative. Negative for pain. Respiratory: Negative. Negative for cough and shortness of breath. Cardiovascular: Negative. Negative for chest pain. Gastrointestinal: Negative. Negative for abdominal pain, diarrhea, nausea and vomiting. Genitourinary: Negative. Negative for difficulty urinating and dysuria. Musculoskeletal: Negative. Negative for arthralgias and joint swelling. Skin: Positive for rash. Negative for color change, pallor and wound. Neurological: Negative. Negative for headaches. Psychiatric/Behavioral: Negative for dysphoric mood, hallucinations, sleep disturbance and suicidal ideas. The patient is nervous/anxious. The patient is not hyperactive. Physical Exam     Vitals:    04/24/18 1547 04/24/18 1549   BP:  135/76   Pulse: (!) 57    Resp: 18    Temp: 97.3 °F (36.3 °C)    SpO2: 100%    Weight: 123.4 kg (272 lb)    Height: 5' 1\" (1.549 m)      Physical Exam   Constitutional: She is oriented to person, place, and time. She appears well-developed and well-nourished. No distress. HENT:   Head: Normocephalic and atraumatic. Head is without raccoon's eyes, without Garcia's sign, without abrasion, without contusion, without laceration, without right periorbital erythema and without left periorbital erythema.  Hair is normal.   Right Ear: Hearing, tympanic membrane, external ear and ear canal normal.   Left Ear: Hearing, tympanic membrane, external ear and ear canal normal.   Nose: Nose normal. No mucosal edema or rhinorrhea. Right sinus exhibits no maxillary sinus tenderness and no frontal sinus tenderness. Left sinus exhibits no maxillary sinus tenderness and no frontal sinus tenderness. Mouth/Throat: Uvula is midline, oropharynx is clear and moist and mucous membranes are normal. No trismus in the jaw. No rash, scaling, alopecia to scalp. Eyes: Conjunctivae and EOM are normal. Pupils are equal, round, and reactive to light. Neck: Normal range of motion. Pulmonary/Chest: Effort normal. No respiratory distress. Musculoskeletal: Normal range of motion. Neurological: She is alert and oriented to person, place, and time. Skin: Skin is warm, dry and intact. Rash noted. No purpura noted. Rash is macular (macular dry darkened rash to posterior neck. No redness, swelling, fluctuance, drainage, pustules, cellulitis. ). Rash is not papular, not nodular, not pustular, not vesicular and not urticarial. She is not diaphoretic. Psychiatric: Her speech is normal and behavior is normal. Judgment and thought content normal. Her mood appears anxious. Her affect is not angry, not blunt, not labile and not inappropriate. She does not exhibit a depressed mood. Nursing note and vitals reviewed. Diagnostic Study Results     Labs -   No results found for this or any previous visit (from the past 12 hour(s)). Radiologic Studies -   No orders to display     CT Results  (Last 48 hours)    None        CXR Results  (Last 48 hours)    None            Medical Decision Making   I am the first provider for this patient. I reviewed the vital signs, available nursing notes, past medical history, past surgical history, family history and social history. Vital Signs-Reviewed the patient's vital signs.     Records Reviewed: Nursing Notes and Old Medical Records    ED Course: Disposition:    DISCHARGE NOTE:   4:16 PM      Care plan outlined and precautions discussed. Patient has no new complaints, changes, or physical findings. Results of exam were reviewed with the patient. All medications were reviewed with the patient; will d/c home with benadryl and triamcinolone for neck atopic derm. All of pt's questions and concerns were addressed. Patient was instructed and agrees to follow up with PCP, as well as to return to the ED upon further deterioration. Patient is ready to go home. Follow-up Information     Follow up With Details Comments Contact Info    Provider Unknown   Patient not available to ask            Current Discharge Medication List      START taking these medications    Details   triamcinolone (KENALOG) 0.1 % lotion Apply  to affected area three (3) times daily. use thin layer  Qty: 60 mL, Refills: 0      diphenhydrAMINE (BENADRYL) 25 mg capsule Take 2 Caps by mouth every six (6) hours as needed. Qty: 10 Cap, Refills: 0         CONTINUE these medications which have NOT CHANGED    Details   divalproex DR (DEPAKOTE) 500 mg tablet Take  by mouth three (3) times daily. loratadine 10 mg cap Take  by mouth.      pregabalin (LYRICA) 100 mg capsule Take  by mouth two (2) times a day. triamcinolone acetonide (KENALOG) 0.1 % ointment Apply  to affected area two (2) times a day. use thin layer      acyclovir (ZOVIRAX) 400 mg tablet Take 400 mg by mouth two (2) times a day. fluticasone (FLONASE) 50 mcg/actuation nasal spray 2 Sprays by Both Nostrils route daily. ipratropium (ATROVENT) 0.02 % nebulizer solution 0.5 mg by Nebulization route as needed for Wheezing. isosorbide mononitrate ER (IMDUR) 30 mg tablet Take 30 mg by mouth daily. lurasidone (LATUDA) 20 mg tab tablet Take 20 mg by mouth daily. lisinopril (PRINIVIL, ZESTRIL) 40 mg tablet Take 40 mg by mouth daily.       metoprolol tartrate (LOPRESSOR) 25 mg tablet Take 25 mg by mouth two (2) times a day. omeprazole (PRILOSEC) 20 mg capsule Take 40 mg by mouth daily. spironolactone (ALDACTONE) 25 mg tablet Take 25 mg by mouth two (2) times a day. topiramate (TOPAMAX) 100 mg tablet Take 100 mg by mouth two (2) times a day.      hydrOXYzine pamoate (VISTARIL) 25 mg capsule Take 25 mg by mouth two (2) times a day. albuterol (PROVENTIL HFA, VENTOLIN HFA, PROAIR HFA) 90 mcg/actuation inhaler Take 2 Puffs by inhalation every four (4) hours as needed for Wheezing. Qty: 1 Inhaler, Refills: 1      furosemide (LASIX) 40 mg tablet Take 40 mg by mouth daily. atorvastatin (LIPITOR) 20 mg tablet Take 40 mg by mouth daily. Provider Notes (Medical Decision Making):   DDx: atopic dermitis, lice, tinea, seborrheic dermatitis, psychosis, anxiety    Procedures:  Procedures        Diagnosis     Clinical Impression:   1. Generalized pruritus    2.  Flexural atopic dermatitis

## 2018-06-05 ENCOUNTER — HOSPITAL ENCOUNTER (EMERGENCY)
Age: 43
Discharge: HOME | End: 2018-06-05
Attending: EMERGENCY MEDICINE | Admitting: EMERGENCY MEDICINE
Payer: MEDICARE

## 2018-06-05 ENCOUNTER — APPOINTMENT (OUTPATIENT)
Dept: GENERAL RADIOLOGY | Age: 43
End: 2018-06-05
Attending: EMERGENCY MEDICINE
Payer: MEDICARE

## 2018-06-05 ENCOUNTER — APPOINTMENT (OUTPATIENT)
Dept: GENERAL RADIOLOGY | Age: 43
End: 2018-06-05
Attending: PHYSICIAN ASSISTANT
Payer: MEDICARE

## 2018-06-05 VITALS
OXYGEN SATURATION: 99 % | HEART RATE: 59 BPM | DIASTOLIC BLOOD PRESSURE: 100 MMHG | SYSTOLIC BLOOD PRESSURE: 168 MMHG | TEMPERATURE: 97.7 F | RESPIRATION RATE: 16 BRPM | HEIGHT: 61 IN

## 2018-06-05 DIAGNOSIS — R07.9 CHEST PAIN, UNSPECIFIED TYPE: ICD-10-CM

## 2018-06-05 DIAGNOSIS — G89.29 CHRONIC PAIN OF BOTH KNEES: Primary | ICD-10-CM

## 2018-06-05 DIAGNOSIS — M25.562 CHRONIC PAIN OF BOTH KNEES: Primary | ICD-10-CM

## 2018-06-05 DIAGNOSIS — M25.561 CHRONIC PAIN OF BOTH KNEES: Primary | ICD-10-CM

## 2018-06-05 LAB
ALBUMIN SERPL-MCNC: 3.5 G/DL (ref 3.5–5)
ALBUMIN/GLOB SERPL: 0.8 {RATIO} (ref 1.1–2.2)
ALP SERPL-CCNC: 65 U/L (ref 45–117)
ALT SERPL-CCNC: 21 U/L (ref 12–78)
ANION GAP SERPL CALC-SCNC: 8 MMOL/L (ref 5–15)
APPEARANCE UR: CLEAR
AST SERPL-CCNC: ABNORMAL U/L (ref 15–37)
BACTERIA URNS QL MICRO: NEGATIVE /HPF
BASOPHILS # BLD: 0 K/UL (ref 0–0.1)
BASOPHILS NFR BLD: 0 % (ref 0–1)
BILIRUB SERPL-MCNC: 0.3 MG/DL (ref 0.2–1)
BILIRUB UR QL CFM: NEGATIVE
BNP SERPL-MCNC: 545 PG/ML (ref 0–125)
BUN SERPL-MCNC: 11 MG/DL (ref 6–20)
BUN/CREAT SERPL: 11 (ref 12–20)
CALCIUM SERPL-MCNC: 8.7 MG/DL (ref 8.5–10.1)
CHLORIDE SERPL-SCNC: 107 MMOL/L (ref 97–108)
CO2 SERPL-SCNC: 24 MMOL/L (ref 21–32)
COLOR UR: NORMAL
CREAT SERPL-MCNC: 1 MG/DL (ref 0.55–1.02)
DIFFERENTIAL METHOD BLD: NORMAL
EOSINOPHIL # BLD: 0.1 K/UL (ref 0–0.4)
EOSINOPHIL NFR BLD: 2 % (ref 0–7)
EPITH CASTS URNS QL MICRO: NORMAL /LPF
ERYTHROCYTE [DISTWIDTH] IN BLOOD BY AUTOMATED COUNT: 13 % (ref 11.5–14.5)
GLOBULIN SER CALC-MCNC: 4.2 G/DL (ref 2–4)
GLUCOSE SERPL-MCNC: 63 MG/DL (ref 65–100)
GLUCOSE UR STRIP.AUTO-MCNC: NEGATIVE MG/DL
HCT VFR BLD AUTO: 39.3 % (ref 35–47)
HGB BLD-MCNC: 12.5 G/DL (ref 11.5–16)
HGB UR QL STRIP: NEGATIVE
HYALINE CASTS URNS QL MICRO: NORMAL /LPF (ref 0–5)
IMM GRANULOCYTES # BLD: 0 K/UL (ref 0–0.04)
IMM GRANULOCYTES NFR BLD AUTO: 0 % (ref 0–0.5)
KETONES UR QL STRIP.AUTO: NEGATIVE MG/DL
LEUKOCYTE ESTERASE UR QL STRIP.AUTO: NEGATIVE
LYMPHOCYTES # BLD: 2.3 K/UL (ref 0.8–3.5)
LYMPHOCYTES NFR BLD: 35 % (ref 12–49)
MCH RBC QN AUTO: 30.9 PG (ref 26–34)
MCHC RBC AUTO-ENTMCNC: 31.8 G/DL (ref 30–36.5)
MCV RBC AUTO: 97.3 FL (ref 80–99)
MONOCYTES # BLD: 0.4 K/UL (ref 0–1)
MONOCYTES NFR BLD: 6 % (ref 5–13)
NEUTS SEG # BLD: 3.7 K/UL (ref 1.8–8)
NEUTS SEG NFR BLD: 57 % (ref 32–75)
NITRITE UR QL STRIP.AUTO: NEGATIVE
NRBC # BLD: 0 K/UL (ref 0–0.01)
NRBC BLD-RTO: 0 PER 100 WBC
PH UR STRIP: 7.5 [PH] (ref 5–8)
PLATELET # BLD AUTO: 223 K/UL (ref 150–400)
PMV BLD AUTO: 10.6 FL (ref 8.9–12.9)
POTASSIUM SERPL-SCNC: ABNORMAL MMOL/L (ref 3.5–5.1)
PROT SERPL-MCNC: 7.7 G/DL (ref 6.4–8.2)
PROT UR STRIP-MCNC: NEGATIVE MG/DL
RBC # BLD AUTO: 4.04 M/UL (ref 3.8–5.2)
RBC #/AREA URNS HPF: NORMAL /HPF (ref 0–5)
SODIUM SERPL-SCNC: 139 MMOL/L (ref 136–145)
SP GR UR REFRACTOMETRY: 1.01 (ref 1–1.03)
TROPONIN I SERPL-MCNC: <0.04 NG/ML
UR CULT HOLD, URHOLD: NORMAL
UROBILINOGEN UR QL STRIP.AUTO: 0.2 EU/DL (ref 0.2–1)
WBC # BLD AUTO: 6.5 K/UL (ref 3.6–11)
WBC URNS QL MICRO: NORMAL /HPF (ref 0–4)

## 2018-06-05 PROCEDURE — 73562 X-RAY EXAM OF KNEE 3: CPT

## 2018-06-05 PROCEDURE — 71046 X-RAY EXAM CHEST 2 VIEWS: CPT

## 2018-06-05 PROCEDURE — 80053 COMPREHEN METABOLIC PANEL: CPT | Performed by: PHYSICIAN ASSISTANT

## 2018-06-05 PROCEDURE — 83880 ASSAY OF NATRIURETIC PEPTIDE: CPT | Performed by: EMERGENCY MEDICINE

## 2018-06-05 PROCEDURE — 85025 COMPLETE CBC W/AUTO DIFF WBC: CPT | Performed by: PHYSICIAN ASSISTANT

## 2018-06-05 PROCEDURE — 84484 ASSAY OF TROPONIN QUANT: CPT | Performed by: EMERGENCY MEDICINE

## 2018-06-05 PROCEDURE — 81001 URINALYSIS AUTO W/SCOPE: CPT | Performed by: EMERGENCY MEDICINE

## 2018-06-05 PROCEDURE — 36415 COLL VENOUS BLD VENIPUNCTURE: CPT | Performed by: PHYSICIAN ASSISTANT

## 2018-06-05 PROCEDURE — 93005 ELECTROCARDIOGRAM TRACING: CPT

## 2018-06-05 PROCEDURE — 99284 EMERGENCY DEPT VISIT MOD MDM: CPT

## 2018-06-05 RX ORDER — IBUPROFEN 600 MG/1
600 TABLET ORAL
Qty: 30 TAB | Refills: 0 | Status: SHIPPED | OUTPATIENT
Start: 2018-06-05 | End: 2018-09-11

## 2018-06-05 NOTE — ED NOTES
Pt crying hysterically while speaking to MD, female  present who identifies as mental health counselor present.

## 2018-06-05 NOTE — Clinical Note
Thank you for allowing us to provide you with medical care today. We realize that you have many choices for your emergency care needs. We thank you for choosing [facility].  Please choose us in the future for any continued health care need s. We hope we addressed all of your medical concerns. We strive to provide excellent quality care in the Emergency Department. Anything less than excellent does not meet our expectations. The exam and treatment you received in the Emergency Depa rtment were for an emergent problem and are not intended as complete care. It is important that you follow up with a doctor, nurse practitioner, or [address] [place] [address] assistant for ongoing care. If your symptoms worsen or you do not improve as expected and you  are unable to reach your usual health care provider, you should return to the Emergency Department. We are available 24 hours a day. Take this sheet with you when you go to your follow-up visit. If you have any problem arranging the follow-up vis it, contact the Emergency Department immediately. Make an appointment your family doctor for follow up of this visit. Return to the ER if you are unable to be seen in a timely manner.

## 2018-06-05 NOTE — ED TRIAGE NOTES
Pt c/o bilateral knee pain, thigh pain , bilateral finger numbness, lump on the back of her neck, pt crying in triage, pt stated she has sarcoidosis and fibromyalgia, pt also urinating on herself getting out of the bed because it is bone on bone, pt take the pills that they gave her but they do not work, pt stated she just got out of the psych cardoza for her anxiety, pt called her pain management doctor and can not be seen unless she pays them

## 2018-06-05 NOTE — DISCHARGE INSTRUCTIONS
Chest Pain: Care Instructions  Your Care Instructions    There are many things that can cause chest pain. Some are not serious and will get better on their own in a few days. But some kinds of chest pain need more testing and treatment. Your doctor may have recommended a follow-up visit in the next 8 to 12 hours. If you are not getting better, you may need more tests or treatment. Even though your doctor has released you, you still need to watch for any problems. The doctor carefully checked you, but sometimes problems can develop later. If you have new symptoms or if your symptoms do not get better, get medical care right away. If you have worse or different chest pain or pressure that lasts more than 5 minutes or you passed out (lost consciousness), call 911 or seek other emergency help right away. A medical visit is only one step in your treatment. Even if you feel better, you still need to do what your doctor recommends, such as going to all suggested follow-up appointments and taking medicines exactly as directed. This will help you recover and help prevent future problems. How can you care for yourself at home? · Rest until you feel better. · Take your medicine exactly as prescribed. Call your doctor if you think you are having a problem with your medicine. · Do not drive after taking a prescription pain medicine. When should you call for help? Call 911 if:  ? · You passed out (lost consciousness). ? · You have severe difficulty breathing. ? · You have symptoms of a heart attack. These may include:  ¨ Chest pain or pressure, or a strange feeling in your chest.  ¨ Sweating. ¨ Shortness of breath. ¨ Nausea or vomiting. ¨ Pain, pressure, or a strange feeling in your back, neck, jaw, or upper belly or in one or both shoulders or arms. ¨ Lightheadedness or sudden weakness. ¨ A fast or irregular heartbeat.   After you call 911, the  may tell you to chew 1 adult-strength or 2 to 4 low-dose aspirin. Wait for an ambulance. Do not try to drive yourself. ?Call your doctor today if:  ? · You have any trouble breathing. ? · Your chest pain gets worse. ? · You are dizzy or lightheaded, or you feel like you may faint. ? · You are not getting better as expected. ? · You are having new or different chest pain. Where can you learn more? Go to http://zenobia-pedro.info/. Enter A120 in the search box to learn more about \"Chest Pain: Care Instructions. \"  Current as of: March 20, 2017  Content Version: 11.4  © 4677-8864 [employer]. Care instructions adapted under license by [employer] (which disclaims liability or warranty for this information). If you have questions about a medical condition or this instruction, always ask your healthcare professional. [place] [address] any warranty or liability for your use of this information. We hope that we have addressed all of your medical concerns. The examination and treatment you received in the Emergency Department were for an emergent problem and were not intended as complete care. It is important that you follow up with your healthcare provider(s) for ongoing care. If your symptoms worsen or do not improve as expected, and you are unable to reach your usual health care provider(s), you should return to the Emergency Department. Today's healthcare is undergoing tremendous change, and patient satisfaction surveys are one of the many tools to assess the quality of medical care. You may receive a survey from the [employer] regarding your experience in the Emergency Department. I hope that your experience has been completely positive, particularly the medical care that I provided. As such, please participate in the survey; anything less than excellent does not meet my expectations or intentions.         [facility] and [employer] participate in nationally recognized quality of care measures. If your blood pressure is greater than 120/80, as reported below, we urge that you seek medical care to address the potential of high blood pressure, commonly known as hypertension. Hypertension can be hereditary or can be caused by certain medical conditions, pain, stress, or \"white coat syndrome. \"       Please make an appointment with your health care provider(s) for follow up of your Emergency Department visit. VITALS:   Patient Vitals for the past 8 hrs:   Temp Pulse Resp BP SpO2   06/05/18 1219 97.7 °F (36.5 °C) 77 16 151/76 100 %          Thank you for allowing us to provide you with medical care today. We realize that you have many choices for your emergency care needs. Please choose us in the future for any continued health care needs. [address], [facility].   Office: [phone]            Recent Results (from the past 24 hour(s))   EKG, 12 LEAD, INITIAL    Collection Time: 06/05/18 12:30 PM   Result Value Ref Range    Ventricular Rate 69 BPM    Atrial Rate 69 BPM    P-R Interval 144 ms    QRS Duration 86 ms    Q-T Interval 416 ms    QTC Calculation (Bezet) 445 ms    Calculated P Axis 27 degrees    Calculated R Axis 19 degrees    Calculated T Axis 16 degrees    Diagnosis       Normal sinus rhythm  When compared with ECG of 10-FEB-2017 13:42,  No significant change was found     CBC WITH AUTOMATED DIFF    Collection Time: 06/05/18 12:43 PM   Result Value Ref Range    WBC 6.5 3.6 - 11.0 K/uL    RBC 4.04 3.80 - 5.20 M/uL    HGB 12.5 11.5 - 16.0 g/dL    HCT 39.3 35.0 - 47.0 %    MCV 97.3 80.0 - 99.0 FL    MCH 30.9 26.0 - 34.0 PG    MCHC 31.8 30.0 - 36.5 g/dL    RDW 13.0 11.5 - 14.5 %    PLATELET 332 608 - 422 K/uL    MPV 10.6 8.9 - 12.9 FL    NRBC 0.0 0  WBC    ABSOLUTE NRBC 0.00 0.00 - 0.01 K/uL    NEUTROPHILS 57 32 - 75 %    LYMPHOCYTES 35 12 - 49 %    MONOCYTES 6 5 - 13 %    EOSINOPHILS 2 0 - 7 %    BASOPHILS 0 0 - 1 %    IMMATURE GRANULOCYTES 0 0.0 - 0.5 %    ABS. NEUTROPHILS 3.7 1.8 - 8.0 K/UL    ABS. LYMPHOCYTES 2.3 0.8 - 3.5 K/UL    ABS. MONOCYTES 0.4 0.0 - 1.0 K/UL    ABS. EOSINOPHILS 0.1 0.0 - 0.4 K/UL    ABS. BASOPHILS 0.0 0.0 - 0.1 K/UL    ABS. IMM. GRANS. 0.0 0.00 - 0.04 K/UL    DF AUTOMATED     METABOLIC PANEL, COMPREHENSIVE    Collection Time: 06/05/18 12:43 PM   Result Value Ref Range    Sodium 139 136 - 145 mmol/L    Potassium HEMOLYZED,RECOLLECT REQUESTED 3.5 - 5.1 mmol/L    Chloride 107 97 - 108 mmol/L    CO2 24 21 - 32 mmol/L    Anion gap 8 5 - 15 mmol/L    Glucose 63 (L) 65 - 100 mg/dL    BUN 11 6 - 20 MG/DL    Creatinine 1.00 0.55 - 1.02 MG/DL    BUN/Creatinine ratio 11 (L) 12 - 20      GFR est AA >60 >60 ml/min/1.73m2    GFR est non-AA >60 >60 ml/min/1.73m2    Calcium 8.7 8.5 - 10.1 MG/DL    Bilirubin, total 0.3 0.2 - 1.0 MG/DL    ALT (SGPT) 21 12 - 78 U/L    AST (SGOT) HEMOLYZED,RECOLLECT REQUESTED 15 - 37 U/L    Alk.  phosphatase 65 45 - 117 U/L    Protein, total 7.7 6.4 - 8.2 g/dL    Albumin 3.5 3.5 - 5.0 g/dL    Globulin 4.2 (H) 2.0 - 4.0 g/dL    A-G Ratio 0.8 (L) 1.1 - 2.2     NT-PRO BNP    Collection Time: 06/05/18 12:43 PM   Result Value Ref Range    NT pro- (H) 0 - 125 PG/ML   TROPONIN I    Collection Time: 06/05/18 12:43 PM   Result Value Ref Range    Troponin-I, Qt. <0.04 <0.05 ng/mL   URINALYSIS W/MICROSCOPIC    Collection Time: 06/05/18  1:39 PM   Result Value Ref Range    Color YELLOW/STRAW      Appearance CLEAR CLEAR      Specific gravity 1.006 1.003 - 1.030      pH (UA) 7.5 5.0 - 8.0      Protein NEGATIVE  NEG mg/dL    Glucose NEGATIVE  NEG mg/dL    Ketone NEGATIVE  NEG mg/dL    Blood NEGATIVE  NEG      Urobilinogen 0.2 0.2 - 1.0 EU/dL    Nitrites NEGATIVE  NEG      Leukocyte Esterase NEGATIVE  NEG      WBC 0-4 0 - 4 /hpf    RBC 0-5 0 - 5 /hpf    Epithelial cells FEW FEW /lpf    Bacteria NEGATIVE  NEG /hpf    Hyaline cast 0-2 0 - 5 /lpf   URINE CULTURE HOLD SAMPLE    Collection Time: 06/05/18  1:39 PM   Result Value Ref Range    Urine culture hold        URINE ON HOLD IN MICROBIOLOGY DEPT FOR 3 DAYS. IF UNPRESERVED URINE IS SUBMITTED, IT CANNOT BE USED FOR ADDITIONAL TESTING AFTER 24 HRS, RECOLLECTION WILL BE REQUIRED. BILIRUBIN, CONFIRM    Collection Time: 06/05/18  1:39 PM   Result Value Ref Range    Bilirubin UA, confirm NEGATIVE  NEG         Xr Chest Pa Lat    Result Date: 6/5/2018  EXAM:  XR CHEST PA LAT INDICATION:   shortness of breath; hx of sarcoidosis COMPARISON: 2017, 2016. FINDINGS: PA and lateral radiographs of the chest demonstrate lungs clear of an acute process. Prominence of the homero is unchanged. . Mild cardiomegaly is stable. .  Bony structures are unchanged. IMPRESSION: No acute abnormality identified. Prominence of the homero is stable. Xr Knee Lt 3 V    Result Date: 6/5/2018  EXAM:  XR KNEE LT 3 V INDICATION:   Trauma. COMPARISON: None. FINDINGS: Three views of the left knee demonstrate moderately severe degenerative changes medial compartment. There are moderate degenerative changes patellofemoral and lateral compartment. There is a vertical lucency through an osteophyte projecting off the lateral tibial plateau which may be chronic. Carrolyn Grange There is suspicion of a small joint effusion. .     IMPRESSION:  There are moderately severe degenerative changes medial compartment and moderate degenerative changes patellofemoral and lateral compartments. There is a vertical lucency projected through an osteophyte projected off of the lateral tibial plateau which may be chronic but correlation would be helpful. There is a small joint effusion. Xr Knee Rt 3 V    Result Date: 6/5/2018  EXAM:  XR KNEE RT 3 V INDICATION:   Trauma. COMPARISON: None. FINDINGS: Three views of the right knee demonstrate no definite acute fracture.  There are moderately severe degenerative changes medial compartment and patellofemoral compartment and mild degenerative changes lateral compartment. . There is suspicion of a small effusion. .     IMPRESSION:  There are moderately severe degenerative changes medial compartment and patellofemoral compartment. No acute fracture identified. There is suspicion of a small joint effusion. Aman Poe

## 2018-06-05 NOTE — ED NOTES
Pt wheeled out of ED with discharge instructions and prescriptions in hand given by Dr. Rosy Engle; pt verbalized understanding of discharge paperwork and time allotted for questions. VSS. Pt alert and oriented.

## 2018-06-05 NOTE — ED NOTES
12:18 PM  I have evaluated the patient as the Provider in Triage. I have reviewed Her vital signs and the triage nurse assessment. I have talked with the patient and any available family and advised that I am the provider in triage and have ordered the appropriate study to initiate their work up based on the clinical presentation during my assessment. I have advised that the patient will be accommodated in the Main ED as soon as possible. I have also requested to contact the triage nurse or myself immediately if the patient experiences any changes in their condition during this brief waiting period. Hx of fibromyalgia. Pt reports chronic pain issues. Complains of shortness of breath. Hx of sarcoidosis. Has pain management doctor.       Too Mckenzie PA-C

## 2018-06-05 NOTE — ED PROVIDER NOTES
HPI Comments: 40 y.o. female with past medical history significant for sarcoidosis, hypertension, bipolar disorder, schizophrenia, heart failure, chronic pain, fibromyalgia who presents, accompanied by mental health counselor, with chief complaint of chronic pain. Patient complains of 6-7 years of chronic knee pain that is worse with walking. Patient has seen ortho in [address] [place] [address] and was told she'd eventually need knee replacements. Patient reports this morning she couldn't move due to pain and almost fell due to pain. Patient states, \"I urinated on myself because I couldn't get to the bathroom in time. I take ambien, klonopin, and risperdal.\"  Patient has been taking ibuprofen and tylenol arthritis with no relief. Patient states she has a pain management physician, but they won't see her because she can't afford the copay. Patient also complains of \"lymph nodes in the back of my neck,\" decreased appetite, blurred vision, and numbness to fingertips. All of these symptoms are chronic and intermittent, returned approximately 1.5 weeks ago. Patient states she has a new doctor who changed her medications recently; her buspar was increased to 10 mg and is not helping with her anxiety. No other acute complaints. There are no other acute medical concerns at this time. Social hx: Current everyday smoker. PCP: PROVIDER UNKNOWN    Note written by Aliyah Gray, as dictated by Manuel Joaquin MD 1:21 PM      The history is provided by the patient and a caregiver.         Past Medical History:   Diagnosis Date    Arthritis     Asthma     [facility] disorder ([facility]     Cardiomyopathy ([address]     Chronic pain     Depression     GERD (gastroesophageal reflux disease)     Heart failure (HCC)     Herpes     Hypertension     Kidney disease     Morbid obesity ([address]     Obstructive sleep apnea     Pseudotumor cerebri     Sarcoidosis     Schizophrenia ([address]        Past Surgical History:   Procedure Laterality Date    HX HERNIA REPAIR      [facility] repair age 16         Family History:   Problem Relation Age of Onset    Hypertension Mother     Psychiatric Disorder Mother     Cancer Father        Social History     Social History    Marital status:      Spouse name: N/A    Number of children: N/A    Years of education: N/A     Occupational History    Not on file. Social History Main Topics    Smoking status: Current Every Day Smoker     Packs/day: 0.25     Years: 23.00     Types: Cigarettes    Smokeless tobacco: Never Used    Alcohol use No    Drug use: No    Sexual activity: Yes     Other Topics Concern    Not on file     Social History Narrative         ALLERGIES: Morphine    Review of Systems   Constitutional: Positive for appetite change. Eyes: Positive for visual disturbance. Musculoskeletal: Positive for arthralgias, gait problem and myalgias. Neurological: Positive for numbness. Psychiatric/Behavioral: The patient is nervous/anxious. All other systems reviewed and are negative. Vitals:    06/05/18 1219   BP: 151/76   Pulse: 77   Resp: 16   Temp: 97.7 °F (36.5 °C)   SpO2: 100%   Height: 5' 1\" (1.549 m)            Physical Exam   Constitutional: She is oriented to person, place, and time. She appears well-developed and well-nourished. M obese, Tearful, multiple conmplaints, AxOx4, speaking in complete sentences, GCS = 15     HENT:   Head: Normocephalic and atraumatic. Nose: Nose normal.   Mouth/Throat: Oropharynx is clear and moist.   Eyes: Conjunctivae and EOM are normal. Pupils are equal, round, and reactive to light. Right eye exhibits no discharge. Left eye exhibits no discharge. No scleral icterus. Neck: Normal range of motion. Neck supple. No JVD present. No tracheal deviation present. Cardiovascular: Normal rate, regular rhythm, normal heart sounds and intact distal pulses. Exam reveals no gallop and no friction rub. No murmur heard.   Pulmonary/Chest: Effort normal and breath sounds normal. No respiratory distress. She has no wheezes. She has no rales. She exhibits no tenderness. Abdominal: Soft. Bowel sounds are normal. There is no tenderness. There is no rebound and no guarding. nttp     Genitourinary: No vaginal discharge found. Genitourinary Comments: 'been peeing on myself because Im on ambien/ Risperdal and klonopin and I cant get up in time/ Im slow to wake up'   Musculoskeletal: Normal range of motion. She exhibits no edema, tenderness or deformity. Neurological: She is alert and oriented to person, place, and time. She has normal reflexes. No cranial nerve deficit. She exhibits normal muscle tone. Coordination normal.   pt has motor/ CV/ Sensation grossly intact to all extremities, R = L in strength;   Skin: Skin is warm and dry. No rash noted. No erythema. No pallor. Psychiatric: She has a normal mood and affect. Her behavior is normal. Thought content normal.   Nursing note and vitals reviewed. MDM      ED Course       Procedures      Chief Complaint   Patient presents with    Pain (Chronic)       1:03 PM  The patients presenting problems have been discussed, and they are in agreement with the care plan formulated and outlined with them. I have encouraged them to ask questions as they arise throughout their visit. MEDICATIONS GIVEN:  Medications - No data to display    LABS REVIEWED:  Labs Reviewed   CBC WITH AUTOMATED DIFF   METABOLIC PANEL, COMPREHENSIVE   NT-PRO BNP   URINALYSIS W/MICROSCOPIC   SAMPLES BEING HELD   TROPONIN I       RADIOLOGY RESULTS:  The following have been ordered and reviewed:  _____________________________________________________________________  _____________________________________________________________________    EKG interpretation:   Rhythm: normal sinus rhythm; and regular . Rate (approx.): 70;  Axis: normal; P wave: normal; QRS interval: normal ; ST/T wave: normal; Negative acute significant segmental elevations/ unchanged compared to study dated 02/10/2017    PROCEDURES:        CONSULTATIONS:       PROGRESS NOTES:      DIAGNOSIS:    1. Chronic pain of both knees    2. Chest pain, unspecified type        PLAN:  1- knees - [facility] ortho as discussed/ ibuprofen  2 CP - neg ed evaluation      ED COURSE: The patients hospital course has been uncomplicated. 1:42 PM  Pt at radiology;       2:03 PM  TigerText sent to ortho.    2:06 PM  DERIAN Calhoun, Consult for Ortho will come evaluate patient in ED.    2:07 PM  Pt much more relaxed, not crying; told of results, agrees w/ [facility]o evaluation at [facility]; will trial antiinflammatories;  Batsheva Gann's  results have been reviewed with her. She has been counseled regarding her diagnosis. She verbally conveys understanding and agreement of the signs, symptoms, diagnosis, treatment and prognosis and additionally agrees to Call/ Arrange follow up as recommended with Dr. Saqib Avelar in 24 - 48 hours. She also agrees with the care-plan and conveys that all of her questions have been answered. I have also put together some discharge instructions for her that include: 1) educational information regarding their diagnosis, 2) how to care for their diagnosis at home, as well a 3) list of reasons why they would want to return to the ED prior to their follow-up appointment, should their condition change or for concerns.

## 2018-06-06 LAB
ATRIAL RATE: 69 BPM
CALCULATED P AXIS, ECG09: 27 DEGREES
CALCULATED R AXIS, ECG10: 19 DEGREES
CALCULATED T AXIS, ECG11: 16 DEGREES
DIAGNOSIS, 93000: NORMAL
P-R INTERVAL, ECG05: 144 MS
Q-T INTERVAL, ECG07: 416 MS
QRS DURATION, ECG06: 86 MS
QTC CALCULATION (BEZET), ECG08: 445 MS
VENTRICULAR RATE, ECG03: 69 BPM

## 2018-08-21 ENCOUNTER — TELEPHONE (OUTPATIENT)
Dept: FAMILY MEDICINE CLINIC | Age: 43
End: 2018-08-21

## 2018-08-21 NOTE — TELEPHONE ENCOUNTER
Patient called stating that the script for scooter, but the company has a waiting list. Patient states that the company said to send a script for a motorized chair might be quicker than the scooter. Patient wanted the script faxed to [phone]. Judy Alvares is the lady to taxes care of the faxes at that office. Thank you.

## 2018-08-22 NOTE — TELEPHONE ENCOUNTER
K,   P call pt. Tell her to get the forms for a motorozied chair and it will require at least 2 -  30 min appt.  ERA Jean Baptiste J

## 2018-08-27 NOTE — TELEPHONE ENCOUNTER
Attempted to call patient. Phone states patient is unavailable. Will try to call again later    Should we send letter to patient, since we are having a hard time reaching patient by phone to inform her to make an appointment.

## 2018-09-11 ENCOUNTER — HOSPITAL ENCOUNTER (EMERGENCY)
Age: 43
Discharge: HOME | End: 2018-09-11
Attending: EMERGENCY MEDICINE
Payer: MEDICARE

## 2018-09-11 ENCOUNTER — APPOINTMENT (OUTPATIENT)
Dept: GENERAL RADIOLOGY | Age: 43
End: 2018-09-11
Attending: EMERGENCY MEDICINE
Payer: MEDICARE

## 2018-09-11 VITALS
RESPIRATION RATE: 18 BRPM | OXYGEN SATURATION: 97 % | TEMPERATURE: 98.8 F | DIASTOLIC BLOOD PRESSURE: 113 MMHG | SYSTOLIC BLOOD PRESSURE: 156 MMHG | HEIGHT: 60 IN | HEART RATE: 87 BPM | BODY MASS INDEX: 57.52 KG/M2 | WEIGHT: 293 LBS

## 2018-09-11 DIAGNOSIS — S80.01XA CONTUSION OF RIGHT KNEE, INITIAL ENCOUNTER: Primary | ICD-10-CM

## 2018-09-11 PROCEDURE — 73562 X-RAY EXAM OF KNEE 3: CPT

## 2018-09-11 PROCEDURE — 99283 EMERGENCY DEPT VISIT LOW MDM: CPT

## 2018-09-11 PROCEDURE — 74011250637 HC RX REV CODE- 250/637: Performed by: EMERGENCY MEDICINE

## 2018-09-11 RX ORDER — HYDROCODONE BITARTRATE AND ACETAMINOPHEN 5; 325 MG/1; MG/1
1 TABLET ORAL ONCE
Status: COMPLETED | OUTPATIENT
Start: 2018-09-11 | End: 2018-09-11

## 2018-09-11 RX ORDER — IBUPROFEN 800 MG/1
800 TABLET ORAL
Qty: 15 TAB | Refills: 0 | Status: SHIPPED | OUTPATIENT
Start: 2018-09-11 | End: 2018-09-16

## 2018-09-11 RX ORDER — IBUPROFEN 400 MG/1
800 TABLET ORAL ONCE
Status: COMPLETED | OUTPATIENT
Start: 2018-09-11 | End: 2018-09-11

## 2018-09-11 RX ORDER — HYDROCODONE BITARTRATE AND ACETAMINOPHEN 5; 325 MG/1; MG/1
1 TABLET ORAL
Qty: 10 TAB | Refills: 0 | Status: SHIPPED | OUTPATIENT
Start: 2018-09-11 | End: 2018-12-07

## 2018-09-11 RX ADMIN — IBUPROFEN 800 MG: 400 TABLET, FILM COATED ORAL at 15:38

## 2018-09-11 RX ADMIN — HYDROCODONE BITARTRATE AND ACETAMINOPHEN 1 TABLET: 5; 325 TABLET ORAL at 15:38

## 2018-09-11 NOTE — ED PROVIDER NOTES
EMERGENCY DEPARTMENT HISTORY AND PHYSICAL EXAM 
 
 
Date: 9/11/2018 Patient Name: Ladi Ray History of Presenting Illness Chief Complaint Patient presents with  Knee Pain  
  chronic knee pain bilateral; reports \"I can't walk\"; patient in wheelchair; reports fell PTA History Provided By: Patient HPI: Ladi Ray, 40 y.o. female with PMHx significant for Sarcoidosis, HTN, asthma, Arthritis, and chronic pain, presents via wheelchair to the ED with cc of sudden onset, moderate, constant, gradually worsening, sharp, right sided, knee pain beginning today x 2:30 PM today after a traumatic fall with no other associated sxs. Pt reports her right knee gave out and she landed on both her knees. Pt notes her pain is worse with movement. Additionally, she adds she is currently looking for a new PCP to see for her chronic pain. She states she has not taken anything for her sxs. She denies any other alleviating or exacerbating factors. She denies any hx of smoking, alcohol or drug use. She specifically denies any fever, chills, CP, SOB, abd pain, nausea, vomiting, diarrhea, HA, LOC, back pain, or neck pain. Chief Complaint: Knee pain Duration: 1 Hours Timing:  Gradual, Constant and Worsening Location: Right knee Quality: Doncarmelita Butcher Severity: 10 out of 10 Modifying Factors: Movement Associated Symptoms: denies any other associated signs or symptoms There are no other complaints, changes, or physical findings at this time. PCP: PROVIDER UNKNOWN Current Outpatient Prescriptions Medication Sig Dispense Refill  ibuprofen (MOTRIN) 800 mg tablet Take 1 Tab by mouth every eight (8) hours as needed for Pain for up to 5 days. 15 Tab 0  
 HYDROcodone-acetaminophen (NORCO) 5-325 mg per tablet Take 1 Tab by mouth every six (6) hours as needed for Pain. Max Daily Amount: 4 Tabs. Indications: Pain 10 Tab 0  cyclobenzaprine (FLEXERIL) 10 mg tablet Take 1 Tab by mouth two (2) times a day. 60 Tab 2  
 triamcinolone (KENALOG) 0.1 % lotion Apply  to affected area three (3) times daily. use thin layer 60 mL 0  
 diphenhydrAMINE (BENADRYL) 25 mg capsule Take 2 Caps by mouth every six (6) hours as needed. 10 Cap 0  
 divalproex DR (DEPAKOTE) 500 mg tablet Take  by mouth three (3) times daily.  loratadine 10 mg cap Take  by mouth.  pregabalin (LYRICA) 100 mg capsule Take  by mouth two (2) times a day.  triamcinolone acetonide (KENALOG) 0.1 % ointment Apply  to affected area two (2) times a day. use thin layer  acyclovir (ZOVIRAX) 400 mg tablet Take 400 mg by mouth two (2) times a day.  fluticasone (FLONASE) 50 mcg/actuation nasal spray 2 Sprays by Both Nostrils route daily.  ipratropium (ATROVENT) 0.02 % nebulizer solution 0.5 mg by Nebulization route as needed for Wheezing.  isosorbide mononitrate ER (IMDUR) 30 mg tablet Take 30 mg by mouth daily.  lurasidone (LATUDA) 20 mg tab tablet Take 20 mg by mouth daily.  lisinopril (PRINIVIL, ZESTRIL) 40 mg tablet Take 40 mg by mouth daily.  metoprolol tartrate (LOPRESSOR) 25 mg tablet Take 25 mg by mouth two (2) times a day.  omeprazole (PRILOSEC) 20 mg capsule Take 40 mg by mouth daily.  spironolactone (ALDACTONE) 25 mg tablet Take 25 mg by mouth two (2) times a day.  topiramate (TOPAMAX) 100 mg tablet Take 100 mg by mouth two (2) times a day.  hydrOXYzine pamoate (VISTARIL) 25 mg capsule Take 25 mg by mouth two (2) times a day.  albuterol (PROVENTIL HFA, VENTOLIN HFA, PROAIR HFA) 90 mcg/actuation inhaler Take 2 Puffs by inhalation every four (4) hours as needed for Wheezing. 1 Inhaler 1  
 furosemide (LASIX) 40 mg tablet Take 40 mg by mouth daily.  atorvastatin (LIPITOR) 20 mg tablet Take 40 mg by mouth daily. Past History Past Medical History: 
Past Medical History:  
Diagnosis Date  Arthritis  Asthma  B[facility] disorder ([facility] [facility]  Cardiomyopathy ([facility]  Chronic pain  Depression  GERD (gastroesophageal reflux disease)  [facility] failure ([facility]  Herpes  Hypertension  Kidney disease  Morbid obesity ([facility]  Obstructive sleep apnea  Pseudotumor cerebri  Sarcoidosis  Schizophrenia ([facility] Past Surgical History: 
Past Surgical History:  
Procedure Laterality Date  HX HERNIA REPAIR    
 [facility] repair age 16 Family History: 
Family History Problem Relation Age of Onset  Hypertension Mother  Psychiatric Disorder Mother  Cancer Father Social History: 
Social History Substance Use Topics  Smoking status: Current Every Day Smoker Packs/day: 0.25 Years: 23.00 Types: Cigarettes  Smokeless tobacco: Never Used  Alcohol use No  
 
 
Allergies: Allergies Allergen Reactions  Morphine Itching and Swelling Review of Systems Review of Systems Constitutional: Negative for chills and fever. HENT: Negative for congestion, rhinorrhea, sneezing and sore throat. Respiratory: Negative for shortness of breath. Cardiovascular: Negative for chest pain. Gastrointestinal: Negative for abdominal pain, nausea and vomiting. Musculoskeletal: Positive for arthralgias (+right knee pain). Negative for back pain, myalgias and neck stiffness. Skin: Negative for rash. Neurological: Negative for dizziness, weakness and headaches. All other systems reviewed and are negative. Physical Exam  
Physical Exam  
Constitutional: She is oriented to person, place, and time. She appears well-developed and well-nourished. HENT:  
Head: Normocephalic and atraumatic. Mouth/Throat: Oropharynx is clear and moist.  
Eyes: Conjunctivae and EOM are normal.  
Neck: Normal range of motion and full passive range of motion without pain. Neck supple.   
Cardiovascular: Normal rate, regular rhythm, S1 normal, S2 normal, normal heart sounds, intact distal pulses and normal pulses. No murmur heard. Pulmonary/Chest: Effort normal and breath sounds normal. No respiratory distress. She has no wheezes. Abdominal: Soft. Normal appearance and bowel sounds are normal. She exhibits no distension. There is no tenderness. There is no rebound. Musculoskeletal: Normal range of motion. She exhibits tenderness. She exhibits no deformity. Tenderness to palpation present in the left anterior knee. Pain with ROM. Pt states this is baseline. Generalized tenderness to palpation in the right knee in all areas. No soft tissue tenderness. Pain with ROM. Neurological: She is alert and oriented to person, place, and time. She has normal strength. Skin: Skin is warm, dry and intact. No rash noted. Psychiatric: She has a normal mood and affect. Her speech is normal and behavior is normal. Judgment and thought content normal.  
Nursing note and vitals reviewed. Diagnostic Study Results Labs - No results found for this or any previous visit (from the past 12 hour(s)). Radiologic Studies -  
XR KNEE RT 3 V Final Result  
  
 
  
  XR KNEE RT 3 V (Final result) Result time: 09/11/18 15:45:26  
  Final result by Srinath Goldman Results In (09/11/18 15:43:34)  
  Initial Result:  
  Impression:  
  IMPRESSION:  No acute bony abnormality. Severe degenerative change. 
 
 
  
  Narrative:  
  EXAM:  XR KNEE RT 3 V 
 
INDICATION:   PAIN.  Chronic bilateral knee pain, patient cannot walk, fall 
prior to admission with right knee injury and worsened pain. COMPARISON: 6/5/2018. FINDINGS: Three views of the right knee demonstrate no fracture or other acute 
osseous or articular abnormality.  There is no effusion. Loann Ball is severe 
degenerative change, with joint space narrowing, articular sclerosis, 
hypertrophic bone formation and subcortical cyst formation, greatest in the 
medial joint compartment. Medical Decision Making I am the first provider for this patient. I reviewed the vital signs, available nursing notes, past medical history, past surgical history, family history and social history. Vital Signs-Reviewed the patient's vital signs. Patient Vitals for the past 12 hrs: 
 Temp Pulse Resp BP SpO2  
09/11/18 1458 98.8 °F (37.1 °C) 87 18 (!) 156/113 97 % Records Reviewed: Nursing Notes and Old Medical Records Provider Notes (Medical Decision Making): DDx: Sprain, strain, contusion, fracture ED Course:  
Initial assessment performed. The patients presenting problems have been discussed, and they are in agreement with the care plan formulated and outlined with them. I have encouraged them to ask questions as they arise throughout their visit. PROGRESS NOTE:  
3:58 PM 
Pt has been re-examined by Destiny Castellanos MD. Discussed image findings with pt. Pt agrees with tx plan. Discussed importance of PCP follow up and all questions answered. Disposition: 
DISCHARGE NOTE 
3:59 PM 
The patient has been re-evaluated and is ready for discharge. Reviewed available results with patient and family. Counseled pt and family on diagnosis and care plan. Pt and family have expressed understanding, and all questions have been answered. Pt and family agree with plan and agree to F/U as recommended, or return to the ED if their sxs worsen. Discharge instructions have been provided and explained to the pt and their family, along with reasons to return to the ED. Written by Tad Cruz, ED Scribe, as dictated by Nestor Omer MD. 
 
PLAN: 
1. Current Discharge Medication List  
  
START taking these medications Details HYDROcodone-acetaminophen (NORCO) 5-325 mg per tablet Take 1 Tab by mouth every six (6) hours as needed for Pain. Max Daily Amount: 4 Tabs. Indications: Pain 
Qty: 10 Tab, Refills: 0 Associated Diagnoses: Contusion of right knee, initial encounter CONTINUE these medications which have CHANGED Details  
ibuprofen (MOTRIN) 800 mg tablet Take 1 Tab by mouth every eight (8) hours as needed for Pain for up to 5 days. Qty: 15 Tab, Refills: 0  
  
  
 
2. Follow-up Information Follow up With Details Comments Contact [facility] In 1 week As needed [id number] [id number] [phone] Return to ED if worse Diagnosis Clinical Impression: 1. Contusion of right knee, initial encounter Attestations: This note is prepared by Dimitrios Camarena, acting as Scribe for [employer], MD. Hector Castellanos MD: The scribe's documentation has been prepared under my direction and personally reviewed by me in its entirety. I confirm that the note above accurately reflects all work, treatment, procedures, and medical decision making performed by me. This note will not be viewable in [address]

## 2018-09-11 NOTE — DISCHARGE INSTRUCTIONS

## 2018-09-11 NOTE — ED NOTES
Emergency Department Nursing Plan of Care The Nursing Plan of Care is developed from the Nursing assessment and Emergency Department Attending provider initial evaluation. The plan of care may be reviewed in the ED Provider note. The Plan of Care was developed with the following considerations:  
Patient / Family readiness to learn indicated by:verbalized understanding Persons(s) to be included in education: patient Barriers to Learning/Limitations:No 
 
Signed Illene Pickup 9/11/2018   3:08 PM

## 2018-09-11 NOTE — ED NOTES
Pt reports ground level fall today just PTA. Reports chronic bilateral knee pain but reports right knee pain worse since fall.

## 2018-09-24 RX ORDER — ETODOLAC 400 MG/1
TABLET, FILM COATED ORAL
Qty: 90 TAB | Refills: 0 | Status: SHIPPED | OUTPATIENT
Start: 2018-09-24 | End: 2018-11-02 | Stop reason: SDUPTHER

## 2018-11-08 DIAGNOSIS — R52 PAIN: Primary | ICD-10-CM

## 2018-11-08 RX ORDER — PREGABALIN 100 MG/1
100 CAPSULE ORAL 2 TIMES DAILY
Qty: 60 CAP | Refills: 3 | Status: SHIPPED | OUTPATIENT
Start: 2018-11-08 | End: 2019-06-19

## 2018-12-07 ENCOUNTER — APPOINTMENT (OUTPATIENT)
Dept: GENERAL RADIOLOGY | Age: 43
End: 2018-12-07
Attending: PHYSICIAN ASSISTANT
Payer: MEDICARE

## 2018-12-07 ENCOUNTER — HOSPITAL ENCOUNTER (EMERGENCY)
Age: 43
Discharge: HOME | End: 2018-12-07
Attending: EMERGENCY MEDICINE
Payer: MEDICARE

## 2018-12-07 VITALS
DIASTOLIC BLOOD PRESSURE: 90 MMHG | WEIGHT: 293 LBS | HEIGHT: 60 IN | RESPIRATION RATE: 20 BRPM | TEMPERATURE: 98.1 F | BODY MASS INDEX: 57.52 KG/M2 | HEART RATE: 75 BPM | OXYGEN SATURATION: 99 % | SYSTOLIC BLOOD PRESSURE: 137 MMHG

## 2018-12-07 DIAGNOSIS — J06.9 VIRAL UPPER RESPIRATORY TRACT INFECTION: Primary | ICD-10-CM

## 2018-12-07 DIAGNOSIS — M54.2 NECK PAIN: ICD-10-CM

## 2018-12-07 PROCEDURE — 72050 X-RAY EXAM NECK SPINE 4/5VWS: CPT

## 2018-12-07 PROCEDURE — 99282 EMERGENCY DEPT VISIT SF MDM: CPT

## 2018-12-07 PROCEDURE — 71046 X-RAY EXAM CHEST 2 VIEWS: CPT

## 2018-12-07 RX ORDER — METHYLPREDNISOLONE 4 MG/1
TABLET ORAL
Qty: 1 DOSE PACK | Refills: 0 | Status: SHIPPED | OUTPATIENT
Start: 2018-12-07 | End: 2019-04-24 | Stop reason: ALTCHOICE

## 2018-12-07 RX ORDER — AZITHROMYCIN 250 MG/1
TABLET, FILM COATED ORAL
Qty: 6 TAB | Refills: 0 | Status: SHIPPED | OUTPATIENT
Start: 2018-12-07 | End: 2018-12-12

## 2018-12-07 RX ORDER — NAPROXEN 500 MG/1
500 TABLET ORAL 2 TIMES DAILY WITH MEALS
Qty: 20 TAB | Refills: 0 | Status: SHIPPED | OUTPATIENT
Start: 2018-12-07 | End: 2019-04-24 | Stop reason: ALTCHOICE

## 2018-12-07 RX ORDER — METHOCARBAMOL 500 MG/1
500 TABLET, FILM COATED ORAL 4 TIMES DAILY
Qty: 30 TAB | Refills: 0 | Status: SHIPPED | OUTPATIENT
Start: 2018-12-07 | End: 2019-06-19

## 2018-12-08 NOTE — ED NOTES
Pt presents ambulatory to ED complaining of cough, possible fever twice over the past week. Pt also has c/o mid upper back/neck pain with inspiration starting about the same time. Pt is alert and oriented x 4, RR even and unlabored, skin is warm and dry. Assesment completed and pt updated on plan of care. Emergency Department Nursing Plan of Care The Nursing Plan of Care is developed from the Nursing assessment and Emergency Department Attending provider initial evaluation. The plan of care may be reviewed in the ED Provider note. The Plan of Care was developed with the following considerations:  
Patient / Family readiness to learn indicated by:verbalized understanding Persons(s) to be included in education: patient Barriers to Learning/Limitations:No 
 
Signed Lobo Delarosa RN   
12/7/2018   9:11 PM

## 2018-12-08 NOTE — ED PROVIDER NOTES
EMERGENCY DEPARTMENT HISTORY AND PHYSICAL EXAM 
 
Date: 12/7/2018 Patient Name: Richi Tuttle History of Presenting Illness Chief Complaint Patient presents with  Cough Pt with c/o cough, chest congestion, body achea and chills for the past week. History Provided By: Patient HPI: Richi Tuttle is a 44 y.o. female with a PMH of CHF, HTN, bipolar, Morbid obesity, ckd, depression who presents with cough, congestion, mylagias and neck pain for 1 week. Pt states she has had the neck pain before as it comes and goes. Denies pain worsening with breathing, stiff neck, trauma to the neck. Pt has tried otc tylenol for the pain. Pt denies any back pain, long distance travels by plane or car, recent surgeries or fractures, history of smoking tobacco products, ocp usage, fevers, chills, chills,  nausea, vomiting, chest pain, shortness of breath, headache, rash, diarrhea, sweating or weight loss. All other ROS negative at this time Pt is in no acute distress and is speaking in full sentences PCP: Jarrell Cranker, MD 
 
Current Outpatient Medications Medication Sig Dispense Refill  methocarbamol (ROBAXIN) 500 mg tablet Take 1 Tab by mouth four (4) times daily. 30 Tab 0  
 naproxen (NAPROSYN) 500 mg tablet Take 1 Tab by mouth two (2) times daily (with meals). 20 Tab 0  
 guaiFENesin (MUCINEX) 1,200 mg Ta12 ER tablet Take 1 Tab by mouth two (2) times a day. 6 Tab 0  
 azithromycin (ZITHROMAX Z-MIRIAM) 250 mg tablet Take two pills day one and take one pill for the next 4 days 6 Tab 0  
 methylPREDNISolone (MEDROL, MIRIAM,) 4 mg tablet Take as directed 1 Dose Pack 0  pregabalin (LYRICA) 100 mg capsule Take 1 Cap by mouth two (2) times a day.  Max Daily Amount: 200 mg. 60 Cap 3  
 etodolac (LODINE) 400 mg tablet TAKE 1 TABLET BY MOUTH 3 TIMES A DAY FOR 2 WEEKS THEN 1 TAB BY MOUTH 3 TIMES A DAY AS NEEDED 90 Tab 0  
  cyclobenzaprine (FLEXERIL) 10 mg tablet Take 1 Tab by mouth two (2) times a day. (Patient taking differently: Take 10 mg by mouth as needed.) 60 Tab 2  
 divalproex DR (DEPAKOTE) 500 mg tablet Take  by mouth three (3) times daily.  fluticasone (FLONASE) 50 mcg/actuation nasal spray 2 Sprays by Both Nostrils route daily.  ipratropium (ATROVENT) 0.02 % nebulizer solution 0.5 mg by Nebulization route as needed for Wheezing.  lisinopril (PRINIVIL, ZESTRIL) 40 mg tablet Take 40 mg by mouth daily.  topiramate (TOPAMAX) 100 mg tablet Take 100 mg by mouth two (2) times a day.  furosemide (LASIX) 40 mg tablet Take 40 mg by mouth daily.  acyclovir (ZOVIRAX) 400 mg tablet Take 400 mg by mouth two (2) times a day.  isosorbide mononitrate ER (IMDUR) 30 mg tablet Take 30 mg by mouth daily.  metoprolol tartrate (LOPRESSOR) 25 mg tablet Take 25 mg by mouth two (2) times a day.  hydrOXYzine pamoate (VISTARIL) 25 mg capsule Take 25 mg by mouth two (2) times a day.  albuterol (PROVENTIL HFA, VENTOLIN HFA, PROAIR HFA) 90 mcg/actuation inhaler Take 2 Puffs by inhalation every four (4) hours as needed for Wheezing. 1 Inhaler 1  
 atorvastatin (LIPITOR) 20 mg tablet Take 40 mg by mouth daily. Past History Past Medical History: 
Past Medical History:  
Diagnosis Date  Arthritis  Asthma  Bipolar disorder ([address]  Cardiomyopathy ([address]  Chronic pain  Depression  GERD (gastroesophageal reflux disease)  Heart failure ([address]  Herpes  Hypertension  Kidney disease  Morbid obesity ([address]  Obstructive sleep apnea  Pseudotumor cerebri  Sarcoidosis  Schizophrenia ([address] Past Surgical History: 
Past Surgical History:  
Procedure Laterality Date  HX HERNIA REPAIR    
 [facility] repair age 16 Family History: 
Family History Problem Relation Age of Onset  Hypertension Mother  Psychiatric Disorder Mother  Cancer Father Social History: 
Social History Tobacco Use  Smoking status: Current Every Day Smoker Packs/day: 0.25 Years: 23.00 Pack years: 5.75 Types: Cigarettes  Smokeless tobacco: Never Used Substance Use Topics  Alcohol use: No  
 Drug use: No  
 
 
Allergies: Allergies Allergen Reactions  Morphine Itching and Swelling Review of Systems Review of Systems Constitutional: Negative. Negative for chills and fever. HENT: Positive for congestion, rhinorrhea, sneezing and sore throat. Negative for drooling, sinus pressure, tinnitus and trouble swallowing. Eyes: Negative. Respiratory: Positive for cough (non productive). Negative for shortness of breath. Cardiovascular: Negative. Negative for chest pain and palpitations. Gastrointestinal: Negative. Negative for abdominal pain, diarrhea, nausea and vomiting. Endocrine: Negative. Genitourinary: Negative. Musculoskeletal: Positive for neck pain. Negative for back pain and gait problem. Skin: Negative. Allergic/Immunologic: Negative. Neurological: Negative. Negative for headaches. Hematological: Negative. Psychiatric/Behavioral: Negative. All other systems reviewed and are negative. Physical Exam  
 
Vitals:  
 12/07/18 2107 12/07/18 2148 BP: (!) 146/93 137/90 Pulse: 75 Resp: 20 Temp: 98.1 °F (36.7 °C) SpO2: 99% Weight: 141.7 kg (312 lb 8 oz) Height: 5' (1.524 m) Physical Exam  
Constitutional: She is oriented to person, place, and time. She appears well-developed and well-nourished. No distress. HENT:  
Head: Normocephalic and atraumatic. Right Ear: External ear normal.  
Left Ear: External ear normal.  
Nose: Nose normal.  
Mouth/Throat: Oropharynx is clear and moist. No oropharyngeal exudate. Eyes: Conjunctivae and EOM are normal. Pupils are equal, round, and reactive to light. Neck: Normal range of motion and full passive range of motion without pain. Neck supple. Spinous process tenderness present. No muscular tenderness present. No neck rigidity. No tracheal deviation, no edema, no erythema and normal range of motion present. Cardiovascular: Normal rate, regular rhythm, normal heart sounds and intact distal pulses. Pulmonary/Chest: Effort normal and breath sounds normal. No respiratory distress. She has no wheezes. Abdominal: Soft. Bowel sounds are normal. She exhibits no distension. There is no tenderness. There is no rebound, no CVA tenderness, no tenderness at McBurney's point and negative Palma's sign. Musculoskeletal: Normal range of motion. She exhibits no edema or deformity. Cervical back: She exhibits tenderness. She exhibits normal range of motion, no bony tenderness, no swelling, no edema, no deformity, no laceration, no pain, no spasm and normal pulse. Thoracic back: She exhibits normal range of motion, no tenderness, no bony tenderness, no swelling, no edema, no deformity, no laceration, no pain, no spasm and normal pulse. Lumbar back: Normal. She exhibits normal range of motion, no tenderness, no bony tenderness, no swelling, no edema, no deformity, no laceration, no pain, no spasm and normal pulse. Back: 
 
Lymphadenopathy:  
  She has no cervical adenopathy. Neurological: She is alert and oriented to person, place, and time. She has normal reflexes. She displays normal reflexes. No cranial nerve deficit. She exhibits normal muscle tone. Coordination normal.  
Skin: Skin is warm and dry. She is not diaphoretic. No pallor. Psychiatric: She has a normal mood and affect. Her behavior is normal. Judgment and thought content normal.  
Nursing note and vitals reviewed. Diagnostic Study Results Labs - No results found for this or any previous visit (from the past 12 hour(s)).  
 
Radiologic Studies -  
XR SPINE CERV 4 OR 5 V  
 Final Result IMPRESSION:  
No acute fracture. Moderate multilevel degenerative spondylosis. XR CHEST PA LAT Final Result IMPRESSION: No acute process CT Results  (Last 48 hours) None CXR Results  (Last 48 hours) 12/07/18 2201  XR CHEST PA LAT Final result Impression:  IMPRESSION: No acute process Narrative:  INDICATION:  pneumonia COMPARISON: 6/5/2018 FINDINGS: PA and lateral views of the chest demonstrate a stable  
cardiomediastinal silhouette and clear lungs bilaterally. The visualized osseous  
structures are unremarkable. Medical Decision Making I am the first provider for this patient. I reviewed the vital signs, available nursing notes, past medical history, past surgical history, family history and social history. Vital Signs-Reviewed the patient's vital signs. Records Reviewed: Nursing Notes, Old Medical Records, Previous Radiology Studies and Previous Laboratory Studies Disposition: 
Discharge DISCHARGE NOTE:  
Care plan outlined and precautions discussed. Patient has no new complaints, changes, or physical findings. Results of visit were reviewed with the patient. All medications were reviewed with the patient; will d/c home. All of pt's questions and concerns were addressed. Patient was instructed and agrees to follow up with pcp, as well as to return to the ED upon further deterioration. Patient is ready to go home. Follow-up Information Follow up With Specialties Details Why Contact Info Bradley Cunningham MD Family Practice Schedule an appointment as soon as possible for a visit in 3 days If symptoms worsen [address] [facility] [address] [place] [address] 
[phone] Discharge Medication List as of 12/7/2018 10:12 PM  
  
START taking these medications  Details  
methocarbamol (ROBAXIN) 500 mg tablet Take 1 Tab by mouth four (4) times daily., Normal, Disp-30 Tab, R-0  
  
naproxen (NAPROSYN) 500 mg tablet Take 1 Tab by mouth two (2) times daily (with meals). , Normal, Disp-20 Tab, R-0  
  
guaiFENesin (MUCINEX) 1,200 mg Ta12 ER tablet Take 1 Tab by mouth two (2) times a day., Normal, Disp-6 Tab, R-0  
  
azithromycin (ZITHROMAX Z-MIRIAM) 250 mg tablet Take two pills day one and take one pill for the next 4 days, Normal, Disp-6 Tab, R-0  
  
methylPREDNISolone (MEDROL, MIRIAM,) 4 mg tablet Take as directed, Normal, Disp-1 Dose Pack, R-0  
  
  
CONTINUE these medications which have NOT CHANGED Details  
pregabalin (LYRICA) 100 mg capsule Take 1 Cap by mouth two (2) times a day. Max Daily Amount: 200 mg., Print, Disp-60 Cap, R-3  
  
etodolac (LODINE) 400 mg tablet TAKE 1 TABLET BY MOUTH 3 TIMES A DAY FOR 2 WEEKS THEN 1 TAB BY MOUTH 3 TIMES A DAY AS NEEDED, Normal, Disp-90 Tab, R-0  
  
cyclobenzaprine (FLEXERIL) 10 mg tablet Take 1 Tab by mouth two (2) times a day., Normal, Disp-60 Tab, R-2  
  
divalproex DR (DEPAKOTE) 500 mg tablet Take  by mouth three (3) times daily. , Historical Med  
  
fluticasone (FLONASE) 50 mcg/actuation nasal spray 2 Sprays by Both Nostrils route daily. , Historical Med  
  
ipratropium (ATROVENT) 0.02 % nebulizer solution 0.5 mg by Nebulization route as needed for Wheezing., Historical Med  
  
lisinopril (PRINIVIL, ZESTRIL) 40 mg tablet Take 40 mg by mouth daily. , Historical Med  
  
topiramate (TOPAMAX) 100 mg tablet Take 100 mg by mouth two (2) times a day., Historical Med  
  
furosemide (LASIX) 40 mg tablet Take 40 mg by mouth daily. , Historical Med  
  
acyclovir (ZOVIRAX) 400 mg tablet Take 400 mg by mouth two (2) times a day., Historical Med  
  
isosorbide mononitrate ER (IMDUR) 30 mg tablet Take 30 mg by mouth daily. , Historical Med  
  
metoprolol tartrate (LOPRESSOR) 25 mg tablet Take 25 mg by mouth two (2) times a day., Historical Med  
  
hydrOXYzine pamoate (VISTARIL) 25 mg capsule Take 25 mg by mouth two (2) times a day., Historical Med  
  
albuterol (PROVENTIL HFA, VENTOLIN HFA, PROAIR HFA) 90 mcg/actuation inhaler Take 2 Puffs by inhalation every four (4) hours as needed for Wheezing., Print, Disp-1 Inhaler, R-1  
  
atorvastatin (LIPITOR) 20 mg tablet Take 40 mg by mouth daily. , Historical Med  
  
  
 
 
Provider Notes (Medical Decision Making): FROM of neck, she is tender to the cervical neck- pain is reproducible- low suspicion for meningitis, no meningeal signs, pain is recreated on palpation- not new symptom, no back pain, pain does not worsen with breathing or inspiration but does worsen with a cough as her neck moves when it happens. Will get xrays- normal vitals and pt is in no distress Worsening si/sxs discussed extensively Follow up with PCP or RTC if symptoms/signs worsen Side effects of medication discussed Education materials provided at discharge Pt verbalizes agreement with plan 
 
Procedures: 
Procedures Diagnosis Clinical Impression: 1. Viral upper respiratory tract infection 2. Neck pain

## 2018-12-08 NOTE — DISCHARGE INSTRUCTIONS
Learning About the Safe Use of Antibiotics  Introduction    Antibiotics are drugs used to kill bacteria. Bacteria can cause infections. These include strep throat, ear infections, and pneumonia. These medicines can't cure everything. They don't kill viruses or help with allergies. They don't help illnesses such as the common cold, the flu, or a runny nose. And they can cause side effects. There are many types of antibiotics. Your doctor will decide which one will work best for your infection. Examples include:  · Amoxicillin. · Cephalexin (Keflex). · Ciprofloxacin (Cipro). What are the possible side effects? Side effects can include:  · Nausea. · Diarrhea. · Skin rash. · Yeast infection. · A severe allergic reaction. It may cause itching, swelling, and breathing problems. This is rare. You may have other side effects or reactions not listed here. Check the information that comes with your medicine. Should you take antibiotics just in case? Don't take antibiotics when you don't need them. If you do that, they may not work when you do need them. Each time you take antibiotics, you are more likely to have some bacteria that survive and aren't killed by the medicine. Bacteria that don't die can change and become even harder to kill. These are called antibiotic-resistant bacteria. They can cause longer and more serious infections. To treat them, you may need different, stronger antibiotics that have more side effects and may cost more. So always ask your doctor if antibiotics are the best treatment. Explain that you do not want antibiotics unless you need them. Help protect the community  Using antibiotics when they're not needed leads to the development of antibiotic-resistant bacteria. These tougher bacteria can spread to family members, children, and coworkers. People in your community will have a risk of getting an infection that is harder to cure and that costs more to treat.   How can you take antibiotics safely? Be safe with medicine. Take your antibiotics as directed. Do not stop taking them just because you feel better. You need to take the full course of medicine. This will help make sure your infection is cured. It will also help prevent the growth of antibiotic-resistant bacteria. Always take the exact amount that the label says to take. If the label says to take the medicine at a certain time, follow those directions. You might feel better after you take an antibiotic for a few days. But it is important to keep taking it for as long as prescribed. That will help you get rid of those bacteria that are a bit stronger and that survive the first few days of treatment. Where can you learn more? Go to http://zenobia-pedro.info/. Enter F695 in the search box to learn more about \"Learning About the Safe Use of Antibiotics. \"  Current as of: November 18, 2017  Content Version: 11.8  © 2521-4700 [employer]. Care instructions adapted under license by [employer] (which disclaims liability or warranty for this information). If you have questions about a medical condition or this instruction, always ask your healthcare professional. [place] [address] any warranty or liability for your use of this information. Neck Pain: Care Instructions  Your Care Instructions    You can have neck pain anywhere from the bottom of your head to the top of your shoulders. It can spread to the upper back or arms. Injuries, painting a ceiling, sleeping with your neck twisted, staying in one position for too long, and many other activities can cause neck pain. Most neck pain gets better with home care. Your doctor may recommend medicine to relieve pain or relax your muscles. He or she may suggest exercise and physical therapy to increase flexibility and relieve stress.  You may need to wear a special (cervical) collar to support your neck for a day or two.  Follow-up care is a key part of your treatment and safety. Be sure to make and go to all appointments, and call your doctor if you are having problems. It's also a good idea to know your test results and keep a list of the medicines you take. How can you care for yourself at home? · Try using a heating pad on a low or medium setting for 15 to 20 minutes every 2 or 3 hours. Try a warm shower in place of one session with the heating pad. · You can also try an ice pack for 10 to 15 minutes every 2 to 3 hours. Put a thin cloth between the ice and your skin. · Take pain medicines exactly as directed. ¨ If the doctor gave you a prescription medicine for pain, take it as prescribed. ¨ If you are not taking a prescription pain medicine, ask your doctor if you can take an over-the-counter medicine. · If your doctor recommends a cervical collar, wear it exactly as directed. When should you call for help? Call your doctor now or seek immediate medical care if:  ? · You have new or worsening numbness in your arms, buttocks or legs. ? · You have new or worsening weakness in your arms or legs. (This could make it hard to stand up.)   ? · You lose control of your bladder or bowels. ? Watch closely for changes in your health, and be sure to contact your doctor if:  ? · Your neck pain is getting worse. ? · You are not getting better after 1 week. ? · You do not get better as expected. Where can you learn more? Go to http://zenobia-pedro.info/. Enter [id number] in the search box to learn more about \"Neck Pain: Care Instructions. \"  Current as of: March 21, 2017  Content Version: 11.5  © 8735-4313 [employer]. Care instructions adapted under license by [employer] (which disclaims liability or warranty for this information).  If you have questions about a medical condition or this instruction, always ask your healthcare professional. Yrn Landrum any warranty or liability for your use of this information.

## 2019-03-20 ENCOUNTER — TELEPHONE (OUTPATIENT)
Dept: FAMILY MEDICINE CLINIC | Age: 44
End: 2019-03-20

## 2019-04-18 ENCOUNTER — TELEPHONE (OUTPATIENT)
Dept: FAMILY MEDICINE CLINIC | Age: 44
End: 2019-04-18

## 2019-04-18 NOTE — TELEPHONE ENCOUNTER
Pt calling stating she had a referral and all necessary information to get a mobility scooter with Dr Monica Wells last year and they need some one to sign off on the paperwork. She states it has just taken this long to process everything and they just need a signature. Does pt require an appt?

## 2019-04-22 ENCOUNTER — TELEPHONE (OUTPATIENT)
Dept: FAMILY MEDICINE CLINIC | Age: 44
End: 2019-04-22

## 2019-04-24 ENCOUNTER — OFFICE VISIT (OUTPATIENT)
Dept: FAMILY MEDICINE CLINIC | Age: 44
End: 2019-04-24

## 2019-04-24 VITALS
HEIGHT: 60 IN | RESPIRATION RATE: 16 BRPM | TEMPERATURE: 99.1 F | SYSTOLIC BLOOD PRESSURE: 133 MMHG | WEIGHT: 293 LBS | HEART RATE: 89 BPM | OXYGEN SATURATION: 93 % | DIASTOLIC BLOOD PRESSURE: 87 MMHG | BODY MASS INDEX: 57.52 KG/M2

## 2019-04-24 DIAGNOSIS — M17.0 PRIMARY OSTEOARTHRITIS OF BOTH KNEES: Primary | ICD-10-CM

## 2019-04-24 DIAGNOSIS — G47.33 OBSTRUCTIVE SLEEP APNEA: ICD-10-CM

## 2019-04-24 DIAGNOSIS — E66.01 MORBID OBESITY (HCC): ICD-10-CM

## 2019-04-24 RX ORDER — BUPRENORPHINE 10 UG/H
PATCH TRANSDERMAL
Refills: 0 | COMMUNITY
Start: 2019-04-19 | End: 2019-06-19

## 2019-04-24 RX ORDER — METOLAZONE 5 MG/1
TABLET ORAL
Refills: 3 | COMMUNITY
Start: 2019-04-08 | End: 2019-06-19

## 2019-04-24 RX ORDER — LEVOTHYROXINE SODIUM 25 UG/1
TABLET ORAL
Refills: 4 | COMMUNITY
Start: 2019-03-20 | End: 2019-06-19 | Stop reason: SDUPTHER

## 2019-04-24 RX ORDER — METOPROLOL SUCCINATE 25 MG/1
TABLET, EXTENDED RELEASE ORAL
Refills: 3 | COMMUNITY
Start: 2019-03-22 | End: 2019-06-19

## 2019-04-24 RX ORDER — LORATADINE 10 MG/1
TABLET ORAL
Refills: 1 | COMMUNITY
Start: 2019-04-05 | End: 2019-06-19

## 2019-04-24 RX ORDER — BUSPIRONE HYDROCHLORIDE 10 MG/1
TABLET ORAL
Refills: 2 | COMMUNITY
Start: 2019-03-20 | End: 2019-06-19

## 2019-04-24 RX ORDER — RISPERIDONE 2 MG/1
TABLET, FILM COATED ORAL
Refills: 2 | COMMUNITY
Start: 2019-03-20 | End: 2019-06-19

## 2019-04-24 RX ORDER — FLUTICASONE PROPIONATE AND SALMETEROL 50; 500 UG/1; UG/1
POWDER RESPIRATORY (INHALATION)
Refills: 0 | COMMUNITY
Start: 2019-03-16 | End: 2021-02-18 | Stop reason: ALTCHOICE

## 2019-04-24 RX ORDER — DOXEPIN HYDROCHLORIDE 25 MG/1
CAPSULE ORAL
Refills: 2 | COMMUNITY
Start: 2019-03-25 | End: 2019-06-19

## 2019-04-24 RX ORDER — MONTELUKAST SODIUM 10 MG/1
TABLET ORAL
Refills: 3 | COMMUNITY
Start: 2019-04-17

## 2019-04-24 RX ORDER — ESCITALOPRAM OXALATE 10 MG/1
TABLET ORAL
COMMUNITY
Start: 2018-04-16 | End: 2019-06-19

## 2019-04-24 RX ORDER — SPIRONOLACTONE 25 MG/1
25 TABLET ORAL DAILY
COMMUNITY
Start: 2018-04-03 | End: 2019-06-19

## 2019-04-24 NOTE — PATIENT INSTRUCTIONS
Learning About Bariatric Surgery  What is bariatric surgery? Bariatric surgery is surgery to help you lose weight. This type of surgery is only used for people who are very overweight and have not been able to lose weight with diet and exercise. This surgery makes the stomach smaller. Some types of surgery also change the connection between your stomach and intestines. How is bariatric surgery done? Bariatric surgery may be either \"open\" or \"laparoscopic. \" Open surgery is done through a large cut (incision) in the belly. Laparoscopic surgery is done through several small cuts. The doctor puts a lighted tube, or scope, and other surgical tools through small cuts in your belly. The doctor is able to see your organs with the scope. There are different types of bariatric surgery. Gastric sleeve surgery  The surgery is usually done through several small incisions in the belly. The doctor removes more than half of your stomach. This leaves a thin sleeve, or tube, that is about the size of a banana. Because part of your stomach has been removed, this can't be reversed. Tito-en-Y gastric bypass surgery  Tito-en-Y (say \"verena-en-why\") surgery changes the connection between the stomach and the intestines. The doctor separates a section of your stomach from the rest of your stomach. This makes a small pouch. The new pouch will hold the food you eat. The doctor connects the stomach pouch to the middle part of the small intestine. Gastric banding surgery  The surgery is usually done through several small incisions in the belly. The doctor wraps a band around the upper part of the stomach. This creates a small pouch. The small size of the pouch means that you will get full after you eat just a small amount of food. The doctor can inflate or deflate the band to adjust the size. This lets the doctor adjust how quickly food passes from the new pouch into the stomach.  It does not change the connection between the stomach and the intestines. What can you expect after the surgery? You may stay in the hospital for one or more days after the surgery. How long you stay depends on the type of surgery you had. Most people need 2 to 4 weeks before they are ready to get back to their usual routine. For the first 2 to 6 weeks after surgery, you probably will need to follow a liquid or soft diet. Bit by bit, you will be able to eat more solid foods. Your doctor may advise you to work with a dietitian. This way you'll be sure to get enough protein, vitamins, and minerals while you are losing weight. Even with a healthy diet, you may need to take vitamin and mineral supplements. After surgery, you will not be able to eat very much at one time. You will get full quickly. Try not to eat too much at one time or eat foods that are high in fat or sugar. If you do, you may vomit, get stomach pain, or have diarrhea. You probably will lose weight very quickly in the first few months after surgery. As time goes on, your weight loss will slow down. You will have regular doctor visits to check how you are doing. Think of bariatric surgery as a tool to help you lose weight. It isn't an instant fix. You will still need to eat a healthy diet and get regular exercise. This will help you reach your weight goal and avoid regaining the weight you lose. Follow-up care is a key part of your treatment and safety. Be sure to make and go to all appointments, and call your doctor if you are having problems. It's also a good idea to know your test results and keep a list of the medicines you take. Where can you learn more? Go to http://zenobia-pedro.info/. Enter G469 in the search box to learn more about \"Learning About Bariatric Surgery. \"  Current as of: June 25, 2018  Content Version: 11.9  © 5385-4974 [employer].  Care instructions adapted under license by [employer] (which disclaims liability or warranty for this information). If you have questions about a medical condition or this instruction, always ask your healthcare professional. [place] [address] any warranty or liability for your use of this information.

## 2019-04-24 NOTE — LETTER
4/24/2019 3:31 PM 
 
Ms. Terry Ramos [address] To Whom It May Concern, Terry Ramos is currently under the care of [address]. She was seen in the office 04/24/19. Patient requires an electric scooter for ambulating and mobility. She has a history of obesity, chronic pain and osteoarthritis that makes ADLs difficult and limits her mobility. Encounter Diagnoses ICD-10-CM ICD-9-CM 1. Primary osteoarthritis of both knees M17.0 715.16 2. Morbid obesity ([address] E66.01 278.01  
3. Obstructive sleep apnea G47.33 327.23 Sincerely, 
 
 
Mariola Buenrostro MD

## 2019-04-24 NOTE — PROGRESS NOTES
1. Have you been to the ER, urgent care clinic, or been hospitalized since your last visit? No     2. Have you seen or consulted any other health care providers outside of the [address] [place] [address] since your last visit?  No     Reviewed record in preparation for visit and have necessary documentation  opportunity was given for questions  Goals that were addressed and/or need to be completed during or after this appointment include   Health Maintenance Due   Topic Date Due    Pneumococcal 0-64 years (1 of 1 - PPSV23) 10/05/1982    DTaP/Tdap/Td series (1 - Tdap) 10/05/1997    MEDICARE YEARLY EXAM  03/14/2018

## 2019-04-24 NOTE — PROGRESS NOTES
Jorge Figueroa  44 y.o. female  1975  [facility]  [id number]     [facility] MEDICINE: Progress Note       Encounter Date: 4/24/2019    Chief Complaint   Patient presents with    Other     request for mobility scooter       History provided by patient  History of Present Illness   Jorge Figueroa is a 44 y.o. female who presents to clinic today for:    Mobility scooter  Patient present with cc of requesting a mobility scooter. Patient states that she was seen last year and had been put on a waiting list for the scooter. She has now reached the top of the list however she needs a new order from a physician as her prior letter was dated 6/7/2018 and the physician has since left this office. Patient is being followed by chiropractor, orthopedic surgeon, and pain managemnt, She states that she was following with a bariatric surgeon and was undergoing mental health evaluation but had not heard back. Shecould not recall when the last time she had seen that physician. Review of Systems   Review of Systems   Constitutional: Negative for chills and fever. Gastrointestinal: Negative for abdominal pain, constipation, diarrhea, nausea and vomiting. Musculoskeletal: Positive for back pain and joint pain. Negative for falls. Skin: Negative for itching and rash. Neurological: Negative for dizziness and headaches. Vitals/Objective:     Vitals:    04/24/19 1511   BP: 133/87   Pulse: 89   Resp: 16   Temp: 99.1 °F (37.3 °C)   TempSrc: Oral   SpO2: 93%   Weight: 344 lb (156 kg)   Height: 5' (1.524 m)     Body mass index is 67.18 kg/m². Wt Readings from Last 3 Encounters:   04/24/19 344 lb (156 kg)   12/07/18 312 lb 8 oz (141.7 kg)   09/11/18 305 lb (138.3 kg)       Physical Exam   Constitutional: She is oriented to person, place, and time. No distress. Morbidly obese   Cardiovascular: Normal rate and regular rhythm.    Pulmonary/Chest: Effort normal and breath sounds normal. No stridor. No respiratory distress. She has no wheezes. She has no rales. Musculoskeletal: She exhibits edema and tenderness. Patient's ROM is limited by pain. No swelling or erythema   Neurological: She is alert and oriented to person, place, and time. No results found for this or any previous visit (from the past 24 hour(s)). Assessment and Plan:     Encounter Diagnoses     ICD-10-CM ICD-9-CM   1. Primary osteoarthritis of both knees M17.0 715.16   2. Morbid obesity ([address] E66.01 278.01   3. Obstructive sleep apnea G47.33 327.23       1. Primary osteoarthritis of both knees  2. Morbid obesity ([address]  3. Obstructive sleep apnea  Release signed to obtain records from her specialists. Letter completed for scooter. Patient urged to f/u with bariatric. I have discussed the diagnosis with the patient and the intended plan as seen in the above orders. she has expressed understanding. The patient has received an after-visit summary and questions were answered concerning future plans. I have discussed medication side effects and warnings with the patient as well. Electronically Signed: Charlene Mercado MD     History/Allergies   Patients past medical, surgical and family histories were reviewed and updated.     Past Medical History:   Diagnosis Date    Arthritis     Asthma     Bipolar disorder ([address]     Cardiomyopathy ([address]     Chronic pain     Depression     GERD (gastroesophageal reflux disease)     Heart failure (HCC)     Herpes     Hypertension     Kidney disease     Morbid obesity ([address]     Obstructive sleep apnea     Pseudotumor cerebri     Sarcoidosis     Schizophrenia (HCC)       Past Surgical History:   Procedure Laterality Date    HX HERNIA REPAIR      [facility] repair age 16     Family History   Problem Relation Age of Onset    Hypertension Mother     Psychiatric Disorder Mother     Cancer Father      Social History     Tobacco Use    Smoking status: Current Every Day Smoker     Packs/day: 0.25     Years: 23.00     Pack years: 5.75     Types: Cigarettes    Smokeless tobacco: Never Used   Substance Use Topics    Alcohol use: No    Drug use: No          Allergies   Allergen Reactions    Morphine Itching and Swelling    Trazodone Other (comments)     Patient overdosed on medication in the past    Vicodin [Hydrocodone-Acetaminophen] Itching       Disposition     Follow-up and Dispositions  ·   Return in about 6 weeks (around 6/5/2019) for Routine with blood work. No future appointments. Current Medications after this visit     Current Outpatient Medications   Medication Sig    busPIRone (BUSPAR) 10 mg tablet TAKE ONE TABLET BY MOUTH EVERY TWELVE HOURS AS DIRECTED    buprenorphine (BUTRANS) 10 mcg/hour APPLY 1 PATCH TOPICALLY EVERY 7 DAYS. APPLY TO CLEAN,DRY,INTACT SKIN    doxepin (SINEQUAN) 25 mg capsule TAKE ONE CAPSULE BY MOUTH AT BEDTIME FOR INSOMNIA    escitalopram oxalate (LEXAPRO) 10 mg tablet TAKE 1 TABLET BY MOUTH AT BEDTIME FOR DEPRESSION AND ANXIETY    ADVAIR DISKUS 500-50 mcg/dose diskus inhaler INHALE ONE PUFF EVERY 12 HOURS.  levothyroxine (SYNTHROID) 25 mcg tablet TAKE ONE TABLET BY MOUTH EVERY DAY    loratadine (CLARITIN) 10 mg tablet TAKE 1 TABLET BY MOUTH EVERY DAY    metOLazone (ZAROXOLYN) 5 mg tablet TAKE ONE TABLET BY MOUTH THREE TIMES A WEEK (monday,thursday AND saturday)    metoprolol succinate (TOPROL-XL) 25 mg XL tablet TAKE 1 TABLET BY MOUTH EVERY DAY    montelukast (SINGULAIR) 10 mg tablet TAKE 1 TABLET BY MOUTH AT BEDTIME    risperiDONE (RISPERDAL) 2 mg tablet TAKE ONE TABLET BY MOUTH AT BEDTIME    spironolactone (ALDACTONE) 25 mg tablet Take 50 mg by mouth.  methocarbamol (ROBAXIN) 500 mg tablet Take 1 Tab by mouth four (4) times daily.  guaiFENesin (MUCINEX) 1,200 mg Ta12 ER tablet Take 1 Tab by mouth two (2) times a day.  pregabalin (LYRICA) 100 mg capsule Take 1 Cap by mouth two (2) times a day.  Max Daily Amount: 200 mg. (Patient taking differently: Take 150 mg by mouth two (2) times a day.)    cyclobenzaprine (FLEXERIL) 10 mg tablet Take 1 Tab by mouth two (2) times a day. (Patient taking differently: Take 10 mg by mouth as needed.)    divalproex DR (DEPAKOTE) 500 mg tablet Take  by mouth three (3) times daily.  fluticasone (FLONASE) 50 mcg/actuation nasal spray 2 Sprays by Both Nostrils route daily.  topiramate (TOPAMAX) 100 mg tablet Take 100 mg by mouth two (2) times a day.  albuterol (PROVENTIL HFA, VENTOLIN HFA, PROAIR HFA) 90 mcg/actuation inhaler Take 2 Puffs by inhalation every four (4) hours as needed for Wheezing.  furosemide (LASIX) 40 mg tablet Take 80 mg by mouth daily.  atorvastatin (LIPITOR) 20 mg tablet Take 40 mg by mouth daily. No current facility-administered medications for this visit.       Medications Discontinued During This Encounter   Medication Reason    methylPREDNISolone (MEDROL, MIRIAM,) 4 mg tablet Therapy Completed    naproxen (NAPROSYN) 500 mg tablet Discontinued by Another Clinician    metoprolol tartrate (LOPRESSOR) 25 mg tablet Discontinued by Another Clinician    lisinopril (PRINIVIL, ZESTRIL) 40 mg tablet Discontinued by Another Clinician    isosorbide mononitrate ER (IMDUR) 30 mg tablet Discontinued by Another Clinician    ipratropium (ATROVENT) 0.02 % nebulizer solution Discontinued by Another Clinician    hydrOXYzine pamoate (VISTARIL) 25 mg capsule Not A Current Medication    etodolac (LODINE) 400 mg tablet Discontinued by Another Clinician    acyclovir (ZOVIRAX) 400 mg tablet Discontinued by Another Clinician

## 2019-05-30 ENCOUNTER — TELEPHONE (OUTPATIENT)
Dept: FAMILY MEDICINE CLINIC | Age: 44
End: 2019-05-30

## 2019-05-30 NOTE — TELEPHONE ENCOUNTER
I saw the patient for a mobility scooter. There was no dicsussion of need for adult pull on needed nor why an excess supply is required. She will need to schedule appt to discuss this. Paperwork will remain on my desk in \"PENDING\" stack.      Edgar Allen MD

## 2019-05-31 NOTE — TELEPHONE ENCOUNTER
Attempted to call. No answer. Message left. Need to advise of message from Dr. Lazaro Clancy. \" I saw the patient for a mobility scooter. There was no dicsussion of need for adult pull on needed nor why an excess supply is required.      She will need to schedule appt to discuss this. Paperwork will remain on my desk in \"PENDING\" stack. \"

## 2019-06-03 ENCOUNTER — TELEPHONE (OUTPATIENT)
Dept: FAMILY MEDICINE CLINIC | Age: 44
End: 2019-06-03

## 2019-06-03 NOTE — TELEPHONE ENCOUNTER
Pt called stating she is out of pull ups for her incontinence. Is this something we are able to help the pt with?

## 2019-06-19 ENCOUNTER — OFFICE VISIT (OUTPATIENT)
Dept: FAMILY MEDICINE CLINIC | Age: 44
End: 2019-06-19

## 2019-06-19 ENCOUNTER — TELEPHONE (OUTPATIENT)
Dept: FAMILY MEDICINE CLINIC | Age: 44
End: 2019-06-19

## 2019-06-19 VITALS
RESPIRATION RATE: 16 BRPM | WEIGHT: 293 LBS | HEIGHT: 60 IN | OXYGEN SATURATION: 97 % | TEMPERATURE: 98.8 F | DIASTOLIC BLOOD PRESSURE: 69 MMHG | BODY MASS INDEX: 57.52 KG/M2 | SYSTOLIC BLOOD PRESSURE: 106 MMHG | HEART RATE: 81 BPM

## 2019-06-19 DIAGNOSIS — E66.01 MORBID OBESITY (HCC): ICD-10-CM

## 2019-06-19 DIAGNOSIS — M19.90 ARTHRITIS: ICD-10-CM

## 2019-06-19 DIAGNOSIS — I50.32 CHRONIC HEART FAILURE WITH PRESERVED EJECTION FRACTION (HCC): ICD-10-CM

## 2019-06-19 DIAGNOSIS — F31.9 BIPOLAR AFFECTIVE DISORDER, REMISSION STATUS UNSPECIFIED (HCC): ICD-10-CM

## 2019-06-19 DIAGNOSIS — M79.7 FIBROMYALGIA: ICD-10-CM

## 2019-06-19 DIAGNOSIS — N39.46 MIXED STRESS AND URGE URINARY INCONTINENCE: Primary | ICD-10-CM

## 2019-06-19 PROBLEM — J44.9 COPD (CHRONIC OBSTRUCTIVE PULMONARY DISEASE) (HCC): Status: ACTIVE | Noted: 2017-02-10

## 2019-06-19 RX ORDER — DICLOFENAC SODIUM 10 MG/G
GEL TOPICAL
Refills: 0 | COMMUNITY
Start: 2019-06-19 | End: 2022-02-02 | Stop reason: ALTCHOICE

## 2019-06-19 RX ORDER — ESCITALOPRAM OXALATE 10 MG/1
5 TABLET ORAL
COMMUNITY
Start: 2019-06-19 | End: 2021-02-18 | Stop reason: ALTCHOICE

## 2019-06-19 RX ORDER — CETIRIZINE HCL 10 MG
10 TABLET ORAL DAILY
Refills: 2 | COMMUNITY
Start: 2019-05-08

## 2019-06-19 RX ORDER — SPIRONOLACTONE 25 MG/1
25 TABLET ORAL DAILY
COMMUNITY
Start: 2019-06-19

## 2019-06-19 RX ORDER — FUROSEMIDE 80 MG/1
80 TABLET ORAL 2 TIMES DAILY
Refills: 3 | COMMUNITY
Start: 2019-06-19

## 2019-06-19 RX ORDER — DICLOFENAC SODIUM 10 MG/G
GEL TOPICAL
Refills: 3 | COMMUNITY
Start: 2019-06-19 | End: 2019-06-19

## 2019-06-19 RX ORDER — PREGABALIN 150 MG/1
150 CAPSULE ORAL 2 TIMES DAILY
Refills: 3 | COMMUNITY
Start: 2019-05-20 | End: 2019-06-19

## 2019-06-19 RX ORDER — BUSPIRONE HYDROCHLORIDE 10 MG/1
10 TABLET ORAL 2 TIMES DAILY
Refills: 0 | COMMUNITY
Start: 2019-06-19 | End: 2022-02-02 | Stop reason: ALTCHOICE

## 2019-06-19 RX ORDER — TRAMADOL HYDROCHLORIDE 50 MG/1
50 TABLET ORAL
Refills: 1 | COMMUNITY
Start: 2019-06-19 | End: 2021-02-18 | Stop reason: ALTCHOICE

## 2019-06-19 RX ORDER — LEVOTHYROXINE SODIUM 25 UG/1
25 TABLET ORAL
Refills: 0 | COMMUNITY
Start: 2019-06-19

## 2019-06-19 RX ORDER — FUROSEMIDE 80 MG/1
80 TABLET ORAL 2 TIMES DAILY
Refills: 3 | COMMUNITY
Start: 2019-05-14 | End: 2019-06-19

## 2019-06-19 RX ORDER — METOPROLOL SUCCINATE 25 MG/1
25 TABLET, EXTENDED RELEASE ORAL DAILY
Refills: 3 | COMMUNITY
Start: 2019-06-19 | End: 2019-06-19

## 2019-06-19 RX ORDER — TRAMADOL HYDROCHLORIDE 50 MG/1
50 TABLET ORAL
Refills: 1 | COMMUNITY
Start: 2019-05-15 | End: 2019-06-19

## 2019-06-19 RX ORDER — DICLOFENAC SODIUM 10 MG/G
GEL TOPICAL
Refills: 3 | COMMUNITY
Start: 2019-05-16 | End: 2019-06-19

## 2019-06-19 RX ORDER — ARIPIPRAZOLE 5 MG/1
5 TABLET ORAL
Refills: 1 | COMMUNITY
Start: 2019-06-04 | End: 2019-06-19 | Stop reason: SDUPTHER

## 2019-06-19 RX ORDER — METOPROLOL SUCCINATE 25 MG/1
25 TABLET, EXTENDED RELEASE ORAL DAILY
Refills: 0 | COMMUNITY
Start: 2019-06-19 | End: 2021-02-18 | Stop reason: SDUPTHER

## 2019-06-19 RX ORDER — ARIPIPRAZOLE 5 MG/1
5 TABLET ORAL
Refills: 1 | COMMUNITY
Start: 2019-06-19 | End: 2021-02-18 | Stop reason: ALTCHOICE

## 2019-06-19 RX ORDER — CYCLOBENZAPRINE HCL 10 MG
10 TABLET ORAL
COMMUNITY
Start: 2019-06-19

## 2019-06-19 RX ORDER — PREGABALIN 150 MG/1
150 CAPSULE ORAL 2 TIMES DAILY
Refills: 3 | COMMUNITY
Start: 2019-06-19 | End: 2021-02-18 | Stop reason: ALTCHOICE

## 2019-06-19 NOTE — PROGRESS NOTES
Identified pt with two pt identifiers(name and ). Reviewed record in preparation for visit and have obtained necessary documentation. Chief Complaint   Patient presents with    New Order     incontinence pull-ups, d/t taking diuretic per pt        Health Maintenance Due   Topic    Pneumococcal 0-64 years (1 of 1 - PPSV23)    DTaP/Tdap/Td series (1 - Tdap)    MEDICARE YEARLY EXAM        Coordination of Care Questionnaire:  :   1) Have you been to an emergency room, urgent care, or hospitalized since your last visit? If yes, where when, and reason for visit? no       2. Have seen or consulted any other health care provider since your last visit? If yes, where when, and reason for visit? YES  - 19 Dr. Glenda Pbalo @ [facility]    Patient is accompanied by self I have received verbal consent from Harsh Bentley to discuss any/all medical information while they are present in the room.

## 2019-06-19 NOTE — PROGRESS NOTES
Wendi Eckert  44 y.o. female  1975  [id number]    [facility] FAMILY MEDICINE  Progress Note     Encounter Date: 6/19/2019    Assessment and Plan:     Encounter Diagnoses     ICD-10-CM ICD-9-CM   1. Mixed stress and urge urinary incontinence N39.46 788.33   2. Morbid obesity ([facility] [address] E66.01 278.01   3. Arthritis M19.90 716.90   4. Fibromyalgia M79.7 729.1   5. Chronic heart failure with preserved ejection fraction (HCC) I50.32 428.9   6. Bipolar affective disorder, remission status unspecified ([facility] F31.9 296.80       1. Mixed stress and urge urinary incontinence  2. Morbid obesity ([facility]  Order to [facility] for adult diapers and supplies completed. On review of records from [facility], it appears that patient has been following with a PCP there. Will call and leighann where patient will be following for care. 3. Arthritis  4. Fibromyalgia  Records Reviewed from [facility]. Med Rec updated. Release signed for records.   - LYRICA 150 mg capsule; Take 1 Cap by mouth two (2) times a day. Max Daily Amount: 300 mg. Prescribed and managed by Dr. Zahra Dumont, [address] [place] [address].; Refill: 3  - traMADol (ULTRAM) 50 mg tablet; Take 1 Tab by mouth three (3) times daily as needed. Max Daily Amount: 150 mg. Prescribed and managed by Dr. Zahra Dumont, [address] [place] [address].; Refill: 1  - cyclobenzaprine (FLEXERIL) 10 mg tablet; Take 1 Tab by mouth two (2) times daily as needed for Muscle Spasm(s). Prescribed and managed by Dr. Zahra Dumont, [address] [place] [address]. Indications: Disorder characterized by Stiff, Tender & Painful Muscles    5. Chronic heart failure with preserved ejection fraction ([facility]  Records Reviewed from [facility]. Med Rec updated. Release signed for records. - spironolactone (ALDACTONE) 25 mg tablet; Take 1 Tab by mouth daily. Prescribed and managed by Dr. Henry Regan, [facility]. - furosemide (LASIX) 80 mg tablet; Take 1 Tab by mouth two (2) times a day.  Prescribed and managed by Dr. Elisa Watkins [facility]; Refill: 3    6. Bipolar affective disorder, remission status unspecified ([facility]  Med Rec updated. Release signed for records. - busPIRone (BUSPAR) 10 mg tablet; Take 1 Tab by mouth two (2) times a day. Prescribed and managed by [facility]; Refill: 0  - escitalopram oxalate (LEXAPRO) 10 mg tablet; Take 0.5 Tabs by mouth nightly. Prescribed and managed by [facility]  - ARIPiprazole (ABILIFY) 5 mg tablet; Take 1 Tab by mouth nightly. Prescribed and managed by [facility] [address]; Refill: 1    I have discussed the diagnosis with the patient and the intended plan as seen in the above orders. she has expressed understanding. The patient has received an after-visit summary and questions were answered concerning future plans. I have discussed medication side effects and warnings with the patient as well. Electronically Signed: Leta Thompson MD      Chief Complaint   Patient presents with    New Order     incontinence pull-ups, d/t taking diuretic per pt       History provided by patient  History of Present Illness   Jo Marks is a 44 y.o. female who presents to clinic today for:    Urinary incontinence. Patient present with cc of incontinence. Patient is using greater then the daily allotment due to increase in the fluid pill and severe osteoarthritis delaying her transport to the restroom. Patient currently uses 5 diapers per day. Health Maintenance  Release signed for records. Health Maintenance Due   Topic Date Due    Pneumococcal 0-64 years (1 of 1 - PPSV23) 10/05/1982    DTaP/Tdap/Td series (1 - Tdap) 10/05/1997    MEDICARE YEARLY EXAM  03/14/2018     Review of Systems   Review of Systems   Constitutional: Negative for chills and fever. Genitourinary: Positive for frequency and urgency. Negative for dysuria, flank pain and hematuria.         Vitals/Objective:     Vitals:    06/19/19 1445 BP: 106/69   Pulse: 81   Resp: 16   Temp: 98.8 °F (37.1 °C)   TempSrc: Oral   SpO2: 97%   Weight: 332 lb (150.6 kg)   Height: 5' (1.524 m)     Body mass index is 64.84 kg/m². Wt Readings from Last 3 Encounters:   06/19/19 332 lb (150.6 kg)   04/24/19 344 lb (156 kg)   12/07/18 312 lb 8 oz (141.7 kg)       Physical Exam   Constitutional: She is oriented to person, place, and time. She appears well-developed and well-nourished. No distress. Morbidly obese   HENT:   Head: Normocephalic and atraumatic. Cardiovascular: Normal rate and regular rhythm. Pulmonary/Chest: Effort normal and breath sounds normal. No stridor. No respiratory distress. She has no wheezes. She has no rales. Musculoskeletal: She exhibits edema and tenderness. Patient's ROM is limited by pain. No swelling or erythema   Neurological: She is alert and oriented to person, place, and time. No results found for this or any previous visit (from the past 24 hour(s)). Disposition     Follow-up and Dispositions  ·   Return in about 3 months (around 9/19/2019) for Routine (Chronic Conditions). No future appointments. Current Medications after this visit     Current Outpatient Medications   Medication Sig    cetirizine (ZYRTEC) 10 mg tablet Take 10 mg by mouth daily.  LYRICA 150 mg capsule Take 1 Cap by mouth two (2) times a day. Max Daily Amount: 300 mg. Prescribed and managed by Dr. Dorinda Corrales, [address] [place] [address].  traMADol (ULTRAM) 50 mg tablet Take 1 Tab by mouth three (3) times daily as needed. Max Daily Amount: 150 mg. Prescribed and managed by Dr. Dorinda Corrales, [address] [place] [address].  cyclobenzaprine (FLEXERIL) 10 mg tablet Take 1 Tab by mouth two (2) times daily as needed for Muscle Spasm(s). Prescribed and managed by Dr. Dorinda Corrales, [address] [place] [address]. Indications: Disorder characterized by Stiff, Tender & Painful Muscles    spironolactone (ALDACTONE) 25 mg tablet Take 1 Tab by mouth daily.  Prescribed and managed by Dr. Stinson Head, [address] [place] [address] Cardiology.  furosemide (LASIX) 80 mg tablet Take 1 Tab by mouth two (2) times a day. Prescribed and managed by Dr. Stinson Head, [address] [place] [address] Cardiology    busPIRone (BUSPAR) 10 mg tablet Take 1 Tab by mouth two (2) times a day. Prescribed and managed by [facility]    escitalopram oxalate (LEXAPRO) 10 mg tablet Take 0.5 Tabs by mouth nightly. [facility]    ARIPiprazole (ABILIFY) 5 mg tablet Take 1 Tab by mouth nightly. [facility]    ADVAIR DISKUS 500-50 mcg/dose diskus inhaler INHALE ONE PUFF EVERY 12 HOURS.  montelukast (SINGULAIR) 10 mg tablet TAKE 1 TABLET BY MOUTH AT BEDTIME    fluticasone (FLONASE) 50 mcg/actuation nasal spray 2 Sprays by Both Nostrils route daily.  topiramate (TOPAMAX) 100 mg tablet Take 100 mg by mouth two (2) times a day.  albuterol (PROVENTIL HFA, VENTOLIN HFA, PROAIR HFA) 90 mcg/actuation inhaler Take 2 Puffs by inhalation every four (4) hours as needed for Wheezing.  atorvastatin (LIPITOR) 20 mg tablet Take 40 mg by mouth daily.  metoprolol succinate (TOPROL-XL) 25 mg XL tablet Take 1 Tab by mouth daily. Prescribed and managed by Dr. Johanna Adamson.  diclofenac (VOLTAREN) 1 % gel APPLY TO THE KNEES 4 TIMES A DAY AS NEEDED. Prescribed and managed by Dr. Johanna Adamson.  levothyroxine (SYNTHROID) 25 mcg tablet Take 1 Tab by mouth Daily (before breakfast). Prescribed and managed by Dr. Johanna Adamson. No current facility-administered medications for this visit.       Medications Discontinued During This Encounter   Medication Reason    furosemide (LASIX) 40 mg tablet Not A Current Medication    divalproex DR (DEPAKOTE) 500 mg tablet Not A Current Medication    cyclobenzaprine (FLEXERIL) 10 mg tablet Not A Current Medication    pregabalin (LYRICA) 100 mg capsule Not A Current Medication    guaiFENesin (MUCINEX) 1,200 mg Ta12 ER tablet Not A Current Medication    risperiDONE (RISPERDAL) 2 mg tablet Not A Current Medication    metOLazone (ZAROXOLYN) 5 mg tablet Not A Current Medication    loratadine (CLARITIN) 10 mg tablet Not A Current Medication    doxepin (SINEQUAN) 25 mg capsule Not A Current Medication    buprenorphine (BUTRANS) 10 mcg/hour Not A Current Medication    methocarbamol (ROBAXIN) 500 mg tablet Not A Current Medication    LYRICA 150 mg capsule     traMADol (ULTRAM) 50 mg tablet     spironolactone (ALDACTONE) 25 mg tablet     metoprolol succinate (TOPROL-XL) 25 mg XL tablet     diclofenac (VOLTAREN) 1 % gel     furosemide (LASIX) 80 mg tablet     busPIRone (BUSPAR) 10 mg tablet     escitalopram oxalate (LEXAPRO) 10 mg tablet     ARIPiprazole (ABILIFY) 5 mg tablet Reorder

## 2019-06-19 NOTE — PATIENT INSTRUCTIONS
Urge Incontinence in Women: Care Instructions Your Care Instructions Urge incontinence occurs when the need to urinate is so strong that you cannot reach the toilet in time, even when your bladder contains only a small amount of urine. This is also called overactive bladder or unstable bladder. Some women may have no warning before they leak urine. This condition does not cause major health problems. But it can be embarrassing and can affect a woman's self-esteem and confidence. Treatment can cure or improve your symptoms. Follow-up care is a key part of your treatment and safety. Be sure to make and go to all appointments, and call your doctor if you are having problems. It's also a good idea to know your test results and keep a list of the medicines you take. How can you care for yourself at home? · Be safe with medicines. Take your medicines exactly as prescribed. Call your doctor if you think you are having a problem with your medicine. You will get more details on the specific medicines your doctor prescribes. · Limit caffeine and alcohol. They stimulate urine production. · Urinate every 2 to 4 hours during waking hours, even if you feel that you do not have to go. · Do pelvic floor (Kegel) exercises, which tighten and strengthen pelvic muscles. To do Kegel exercises: 
? Squeeze the same muscles you would use to stop your urine. Your belly and thighs should not move. ? Hold the squeeze for 3 seconds, then relax for 3 seconds. ? Start with 3 seconds. Then add 1 second each week until you are able to squeeze for 10 seconds. ? Repeat the exercise 10 to 15 times for each session. Do three or more sessions each day. · Try wearing pads that absorb the leaks. · Keep skin in the genital area dry. When should you call for help? Call your doctor now or seek immediate medical care if: 
  · You have new urinary symptoms.  These may include leaking urine, having pain when urinating, or feeling like you need to urinate often.  
 Watch closely for changes in your health, and be sure to contact your doctor if: 
  · You do not get better as expected. Where can you learn more? Go to http://zenobia-pedro.info/. Enter G601 in the search box to learn more about \"Urge Incontinence in Women: Care Instructions. \" Current as of: May 14, 2018 Content Version: 11.9 © 8797-2733 [employer]. Care instructions adapted under license by [employer] (which disclaims liability or warranty for this information). If you have questions about a medical condition or this instruction, always ask your healthcare professional. [address] any warranty or liability for your use of this information.

## 2019-06-19 NOTE — LETTER
6/19/2019 Ms. Chandler Trinidad  [address], [address] [id number] Current Outpatient Medications Medication Sig  LYRICA 150 mg capsule Take 150 mg by mouth two (2) times a day.  furosemide (LASIX) 80 mg tablet Take 80 mg by mouth two (2) times a day.  traMADol (ULTRAM) 50 mg tablet Take 50 mg by mouth three (3) times daily as needed.  diclofenac (VOLTAREN) 1 % gel APPLY 4 GRAMS TO THE KNEES 4 TIMES A DAY AS NEEDED  cetirizine (ZYRTEC) 10 mg tablet Take 10 mg by mouth daily.  ARIPiprazole (ABILIFY) 5 mg tablet Take 5 mg by mouth nightly.  busPIRone (BUSPAR) 10 mg tablet TAKE ONE TABLET BY MOUTH EVERY TWELVE HOURS AS DIRECTED  escitalopram oxalate (LEXAPRO) 10 mg tablet TAKE 0.5  TABLET BY MOUTH AT BEDTIME FOR DEPRESSION AND ANXIETY  ADVAIR DISKUS 500-50 mcg/dose diskus inhaler INHALE ONE PUFF EVERY 12 HOURS.  levothyroxine (SYNTHROID) 25 mcg tablet TAKE ONE TABLET BY MOUTH EVERY DAY  metoprolol succinate (TOPROL-XL) 25 mg XL tablet TAKE 1 TABLET BY MOUTH EVERY DAY  montelukast (SINGULAIR) 10 mg tablet TAKE 1 TABLET BY MOUTH AT BEDTIME  spironolactone (ALDACTONE) 25 mg tablet Take 25 mg by mouth daily.  methocarbamol (ROBAXIN) 500 mg tablet Take 1 Tab by mouth four (4) times daily.  fluticasone (FLONASE) 50 mcg/actuation nasal spray 2 Sprays by Both Nostrils route daily.  topiramate (TOPAMAX) 100 mg tablet Take 100 mg by mouth two (2) times a day.  albuterol (PROVENTIL HFA, VENTOLIN HFA, PROAIR HFA) 90 mcg/actuation inhaler Take 2 Puffs by inhalation every four (4) hours as needed for Wheezing.  atorvastatin (LIPITOR) 20 mg tablet Take 40 mg by mouth daily.  buprenorphine (BUTRANS) 10 mcg/hour  doxepin (SINEQUAN) 25 mg capsule TAKE ONE CAPSULE BY MOUTH AT BEDTIME FOR INSOMNIA  
 loratadine (CLARITIN) 10 mg tablet TAKE 1 TABLET BY MOUTH EVERY DAY  metOLazone (ZAROXOLYN) 5 mg tablet TAKE ONE TABLET BY MOUTH THREE TIMES A WEEK (monday,thursday AND saturday)  risperiDONE (RISPERDAL) 2 mg tablet TAKE ONE TABLET BY MOUTH AT BEDTIME  guaiFENesin (MUCINEX) 1,200 mg Ta12 ER tablet Take 1 Tab by mouth two (2) times a day.  pregabalin (LYRICA) 100 mg capsule Take 1 Cap by mouth two (2) times a day. Max Daily Amount: 200 mg. (Patient not taking: Reported on 6/19/2019)  cyclobenzaprine (FLEXERIL) 10 mg tablet Take 1 Tab by mouth two (2) times a day. (Patient taking differently: Take 10 mg by mouth as needed.)  divalproex DR (DEPAKOTE) 500 mg tablet Take  by mouth three (3) times daily.  furosemide (LASIX) 40 mg tablet Take 80 mg by mouth daily. No current facility-administered medications for this visit.    
 
 
 
 
 
Sincerely, 
 
 
eLta Thompson MD

## 2019-06-19 NOTE — TELEPHONE ENCOUNTER
I have reviewed records from [facility]. It appears that patient has a PCP, Dr. Dago Perdomo, who has been following her care and managing all her medications. Please clarfiy with patient who she will be continuing to follow with. If the PCP @[facility], please advise that in the future all orders for DME such as adult diaper will need to go thru Dr. Dago Perdomo' office.        Craig Alarcon MD

## 2019-06-25 ENCOUNTER — TELEPHONE (OUTPATIENT)
Dept: FAMILY MEDICINE CLINIC | Age: 44
End: 2019-06-25

## 2019-06-25 NOTE — TELEPHONE ENCOUNTER
Attempted to call. No answer. Another message left. Need to advise of message from Dr. Bessie Jaquez.

## 2019-07-01 ENCOUNTER — TELEPHONE (OUTPATIENT)
Dept: FAMILY MEDICINE CLINIC | Age: 44
End: 2019-07-01

## 2019-07-01 NOTE — TELEPHONE ENCOUNTER
The order was submitted after her last appointment on 6/19/2019. However please clarify with patietn which PCP she is following as per encounter on 6/19/2019.     Maribel Joya MD

## 2019-07-01 NOTE — TELEPHONE ENCOUNTER
----- Message from Meigeorge Campos sent at 7/1/2019  2:06 PM EDT -----  Regarding: [place]/telephone  Pt is requesting an order for pull ups sent to [facility]. Pts number is [phone] and [facility] number is [phone]. She is out of the pull ups.

## 2019-07-01 NOTE — TELEPHONE ENCOUNTER
----- Message from James Mendez sent at 7/1/2019  2:06 PM EDT -----  Regarding: [place]/telephone  Pt is requesting an order for pull ups sent to [facility]. Pts number is [phone] and [facility] number is [phone]. She is out of the pull ups.

## 2019-07-11 ENCOUNTER — TELEPHONE (OUTPATIENT)
Dept: FAMILY MEDICINE CLINIC | Age: 44
End: 2019-07-11

## 2019-07-11 NOTE — TELEPHONE ENCOUNTER
Please call [facility] regarding Pull-on adult XXL. Per my encounter with the patient, she uses 5 diapers per day. Therefore the quality limit of 180 per 31 is more than satisfactory. No excess is required.       Deirdre Josue MD

## 2019-09-16 ENCOUNTER — HOSPITAL ENCOUNTER (EMERGENCY)
Age: 44
Discharge: HOME | End: 2019-09-16
Attending: EMERGENCY MEDICINE
Payer: MEDICARE

## 2019-09-16 VITALS
BODY MASS INDEX: 57.52 KG/M2 | HEART RATE: 85 BPM | SYSTOLIC BLOOD PRESSURE: 145 MMHG | WEIGHT: 293 LBS | HEIGHT: 60 IN | RESPIRATION RATE: 20 BRPM | TEMPERATURE: 98 F | DIASTOLIC BLOOD PRESSURE: 95 MMHG | OXYGEN SATURATION: 95 %

## 2019-09-16 DIAGNOSIS — S01.531A PIERCED LIP INFECTION: ICD-10-CM

## 2019-09-16 DIAGNOSIS — L08.9 PIERCED LIP INFECTION: ICD-10-CM

## 2019-09-16 DIAGNOSIS — T14.8XXA FOREIGN BODY IN SKIN: Primary | ICD-10-CM

## 2019-09-16 PROCEDURE — 75810000121 HC INCSN/RMVL FB ANY OTHER SITE

## 2019-09-16 PROCEDURE — 99283 EMERGENCY DEPT VISIT LOW MDM: CPT

## 2019-09-16 PROCEDURE — 74011000250 HC RX REV CODE- 250: Performed by: PHYSICIAN ASSISTANT

## 2019-09-16 RX ORDER — CLINDAMYCIN HYDROCHLORIDE 300 MG/1
300 CAPSULE ORAL 4 TIMES DAILY
Qty: 28 CAP | Refills: 0 | Status: SHIPPED | OUTPATIENT
Start: 2019-09-16 | End: 2019-09-23

## 2019-09-16 RX ORDER — LIDOCAINE HYDROCHLORIDE AND EPINEPHRINE 10; 10 MG/ML; UG/ML
1.5 INJECTION, SOLUTION INFILTRATION; PERINEURAL ONCE
Status: COMPLETED | OUTPATIENT
Start: 2019-09-16 | End: 2019-09-16

## 2019-09-16 RX ADMIN — LIDOCAINE HYDROCHLORIDE,EPINEPHRINE BITARTRATE 15 MG: 10; .01 INJECTION, SOLUTION INFILTRATION; PERINEURAL at 17:22

## 2019-09-16 RX ADMIN — BACITRACIN ZINC, NEOMYCIN SULFATE, POLYMYXIN B SULFATE 1 PACKET: 3.5; 5000; 4 OINTMENT TOPICAL at 17:40

## 2019-09-16 NOTE — ED NOTES
No further signs of bleeding noted. Okay to d/c per provider permission. Area covered with band-aid.

## 2019-09-16 NOTE — DISCHARGE INSTRUCTIONS
Patient Education        Infection From Body Piercings: Care Instructions  Your Care Instructions  An infected piercing can be serious. The area around your piercing may be painful, swollen, red, and hot. You may see red streaks or pus at the piercing site. You may have a fever. Or you may have swollen or tender lymph nodes. It's important to take good care of your infection at home so it doesn't get worse. Follow-up care is a key part of your treatment and safety. Be sure to make and go to all appointments, and call your doctor if you are having problems. It's also a good idea to know your test results and keep a list of the medicines you take. How can you care for yourself at home? · If your doctor prescribed antibiotics, take them as directed. Do not stop taking them just because you feel better. You need to take the full course of antibiotics. · Remove the jewelry unless your doctor says it's okay to keep it in. Soak the area in warm water for 20 minutes, 3 or 4 times a day. If it's too hard to soak the site (for example, if you had your belly button pierced), apply a warm, moist cloth instead. · If your doctor told you how to care for your infected piercing, follow your doctor's instructions. If you did not get instructions, follow this general advice:  ? Wash the area with clean water 2 times a day. Don't use hydrogen peroxide or alcohol, which can slow healing. ? You may cover the area with a thin layer of petroleum jelly, such as Vaseline, and a nonstick bandage. ? Apply more petroleum jelly and replace the bandage as needed. · Ask your doctor if you can take an over-the-counter pain medicine, such as acetaminophen (Tylenol), ibuprofen (Advil, Motrin), or naproxen (Aleve). Be safe with medicines. Read and follow all instructions on the label. When should you call for help?   Call your doctor now or seek immediate medical care if:    · You lose feeling in the area near the piercing, or it feels numb or tingly.     · The skin near the piercing turns pale or cool.     · The pierced area starts to bleed, and blood soaks through the bandage. Oozing small amounts of blood is normal.    Watch closely for changes in your health, and be sure to contact your doctor if:    · Your symptoms are getting worse. Where can you learn more? Go to http://zenobia-pedro.info/. Enter U548 in the search box to learn more about \"Infection From Body Piercings: Care Instructions. \"  Current as of: September 23, 2018  Content Version: 12.1  © 2149-9780 [employer]. Care instructions adapted under license by [employer] (which disclaims liability or warranty for this information). If you have questions about a medical condition or this instruction, always ask your healthcare professional. [address] any warranty or liability for your use of this information.

## 2019-09-16 NOTE — ED NOTES
Pt holding direct pressure on wound for 15 mins prior to d/c to ensure bleeding has stopped. Will recheck on pt.

## 2019-09-16 NOTE — ED NOTES
Emergency Department Nursing Plan of Care       The Nursing Plan of Care is developed from the Nursing assessment and Emergency Department Attending provider initial evaluation. The plan of care may be reviewed in the ED Provider note.     The Plan of Care was developed with the following considerations:   Patient / Family readiness to learn indicated by:verbalized understanding  Persons(s) to be included in education: patient  Barriers to Learning/Limitations:No    Signed     Lizz Santos RN    9/16/2019   4:50 PM

## 2020-04-16 NOTE — ED NOTES
Abigail at bedside reviewing patient's discharge instructions and reviewing medications. Patient ambulatory home. Patient in no apparent distress. Patient needs a refill of her Xeljanz and she uses [facility]. She also states that her dose was decreased to 1 tablet per day.

## 2020-04-28 NOTE — ED PROVIDER NOTES
EMERGENCY DEPARTMENT HISTORY AND PHYSICAL EXAM      Date: 9/16/2019  Patient Name: Nathan Culver    History of Presenting Illness     Chief Complaint   Patient presents with    Facial Pain     History Provided By: Patient    HPI: Nathan Culver, 44 y.o. female with obesity, sarcoidosis, hypertension, bipolar disorder, asthma, obstructive sleep apnea, GERD, pseudotumor cerebri, schizophrenia, kidney disease, heart failure, chronic pain, depression, cardiomyopathy, herpes, tobacco abuse who presents ambulatory to the ED with cc of subacute moderate worsening left upper lip swelling, pustular drainage, pain X 1 week. Patient endorses that she had her left upper lip pierced some years ago and is never had the symptoms around the site before. Endorses she did notice that the inside portion of the piercing had closed over. Denies fever, chills, nausea, vomiting, injury, headache, lightheadedness, dizziness. No medications or modifying factors. PCP: Christina Alicia MD    There are no other complaints, changes, or physical findings at this time. No current facility-administered medications on file prior to encounter. Current Outpatient Medications on File Prior to Encounter   Medication Sig Dispense Refill    cetirizine (ZYRTEC) 10 mg tablet Take 10 mg by mouth daily. 2    LYRICA 150 mg capsule Take 1 Cap by mouth two (2) times a day. Max Daily Amount: 300 mg. Prescribed and managed by Dr. Mary Lou Lynn, [address] [place] [address]. 3    traMADol (ULTRAM) 50 mg tablet Take 1 Tab by mouth three (3) times daily as needed. Max Daily Amount: 150 mg. Prescribed and managed by Dr. Mary Lou Lynn, [address] [place] [address]. 1    cyclobenzaprine (FLEXERIL) 10 mg tablet Take 1 Tab by mouth two (2) times daily as needed for Muscle Spasm(s). Prescribed and managed by Dr. Mary Lou Lynn, [address] [place] [address].   Indications: Disorder characterized by Stiff, Tender & Painful Muscles      spironolactone (ALDACTONE) 25 mg tablet Take 1 Tab by mouth daily. Prescribed and managed by Dr. Tj Davies, [address] [place] [address] Cardiology.  furosemide (LASIX) 80 mg tablet Take 1 Tab by mouth two (2) times a day. Prescribed and managed by Dr. Tj Davies, [facility]  3    busPIRone (BUSPAR) 10 mg tablet Take 1 Tab by mouth two (2) times a day. Prescribed and managed by [facility]  0    escitalopram oxalate (LEXAPRO) 10 mg tablet Take 0.5 Tabs by mouth nightly. Prescribed and managed by [facility]      ARIPiprazole (ABILIFY) 5 mg tablet Take 1 Tab by mouth nightly. Prescribed and managed by [facility]  1    metoprolol succinate (TOPROL-XL) 25 mg XL tablet Take 1 Tab by mouth daily. Prescribed and managed by Dr. Tadeo Josafat. 0    diclofenac (VOLTAREN) 1 % gel APPLY TO THE KNEES 4 TIMES A DAY AS NEEDED. Prescribed and managed by Dr. Tadeo Josafat. 0    levothyroxine (SYNTHROID) 25 mcg tablet Take 1 Tab by mouth Daily (before breakfast). Prescribed and managed by Dr. Tadeo Josafat. 0    ADVAIR DISKUS 500-50 mcg/dose diskus inhaler INHALE ONE PUFF EVERY 12 HOURS.  0    montelukast (SINGULAIR) 10 mg tablet TAKE 1 TABLET BY MOUTH AT BEDTIME  3    fluticasone (FLONASE) 50 mcg/actuation nasal spray 2 Sprays by Both Nostrils route daily.  topiramate (TOPAMAX) 100 mg tablet Take 100 mg by mouth two (2) times a day.  albuterol (PROVENTIL HFA, VENTOLIN HFA, PROAIR HFA) 90 mcg/actuation inhaler Take 2 Puffs by inhalation every four (4) hours as needed for Wheezing. 1 Inhaler 1    atorvastatin (LIPITOR) 20 mg tablet Take 40 mg by mouth daily.        Past History     Past Medical History:  Past Medical History:   Diagnosis Date    Arthritis     Asthma     Bipolar disorder ([facility]     [facility]     Chronic pain     Depression     GERD (gastroesophageal reflux disease)     Heart 40 mEq Oral PRN Meron Baum MD        Or    potassium bicarb-citric acid (EFFER-K) effervescent tablet 40 mEq  40 mEq Oral PRN Meron Baum MD        Or    potassium chloride 10 mEq/100 mL IVPB (Peripheral Line)  10 mEq Intravenous PRN Meron Baum MD        magnesium sulfate 1 g in dextrose 5% 100 mL IVPB  1 g Intravenous PRN Meron Baum MD        acetaminophen (TYLENOL) tablet 650 mg  650 mg Oral Q6H PRN Meron Baum MD   650 mg at 04/27/20 2112    Or    acetaminophen (TYLENOL) suppository 650 mg  650 mg Rectal Q6H PRN Meron Baum MD        polyethylene glycol (GLYCOLAX) packet 17 g  17 g Oral Daily PRN Meron Baum MD        promethazine (PHENERGAN) tablet 12.5 mg  12.5 mg Oral Q6H PRN Meron Baum MD        Or    ondansetron (ZOFRAN) injection 4 mg  4 mg Intravenous Q6H PRN Meron Baum MD        nicotine (NICODERM CQ) 21 MG/24HR 1 patch  1 patch Transdermal Daily PRN Meron Baum MD        enoxaparin (LOVENOX) injection 40 mg  40 mg Subcutaneous Daily Meron Baum MD   40 mg at 04/28/20 0935       Allergies   Allergen Reactions    Depakote [Valproic Acid]     Seroquel [Quetiapine Fumarate]      Causes seizure    Zoloft Other (See Comments)     seizure       ROS:   Constitutional                  Negative for fever and chills   HEENT                            Negative for ear discharge, ear pain, nosebleed  Eyes                                Negative for photophobia, pain and discharge  Respiratory                      Negative for hemoptysis and sputum  Cardiovascular                Negative for orthopnea, claudication and PND  Gastrointestinal               Negative for abdominal pain, diarrhea, blood in stool  Musculoskeletal               Negative for joint pain, negative for myalgia  Skin                                 Negative for rash or itching  Endo/heme/allergies       Negative for polydipsia, environmental allergy  Psychiatric failure ([facility] [address]     Herpes     Hypertension     Kidney disease     Morbid obesity ([facility] [address] [facility]     Obstructive sleep apnea     Pseudotumor cerebri     Sarcoidosis     Schizophrenia (HCC)      Past Surgical History:  Past Surgical History:   Procedure Laterality Date    HX HERNIA REPAIR      [facility] repair age 16     Family History:  Family History   Problem Relation Age of Onset    Hypertension Mother     Psychiatric Disorder Mother     Cancer Father      Social History:  Social History     Tobacco Use    Smoking status: Current Every Day Smoker     Packs/day: 0.25     Years: 23.00     Pack years: 5.75     Types: Cigarettes    Smokeless tobacco: Never Used   Substance Use Topics    Alcohol use: No    Drug use: No     Allergies: Allergies   Allergen Reactions    Morphine Itching and Swelling    Trazodone Other (comments)     Patient overdosed on medication in the past    Vicodin [Hydrocodone-Acetaminophen] Itching     Review of Systems   Review of Systems   Constitutional: Negative for activity change, chills, fatigue and fever. HENT: Positive for facial swelling. Negative for congestion, ear pain, hearing loss, postnasal drip, rhinorrhea and trouble swallowing. Eyes: Negative. Negative for pain and visual disturbance. Respiratory: Negative for cough and shortness of breath. Cardiovascular: Negative for chest pain. Gastrointestinal: Negative for abdominal pain, nausea and vomiting. Musculoskeletal: Negative. Negative for neck pain. Skin: Positive for rash. Negative for wound. Neurological: Negative for dizziness, seizures, weakness, light-headedness, numbness and headaches. Psychiatric/Behavioral: Negative. Physical Exam   Physical Exam   Constitutional: She is oriented to person, place, and time. She appears well-developed and well-nourished. No distress. HENT:   Head: Normocephalic and atraumatic.    Right Ear: Hearing and external ear normal.   Left Ear: Hearing and external ear Negative for suicidal ideation. Patient is not anxious    Vitals:    04/28/20 1200   BP: 134/80   Pulse: 78   Resp: 18   Temp: 99.3 °F (37.4 °C)   SpO2: 98%     Admission weight: 160 lb (72.6 kg)    Neurological Examination  Constitutional .General exam well groomed   Head/ Ears /Nose/Throat/external ear . Normal exam  Neck and thyroid . Normal size. No bruits  Respiratory . Breathsounds clear bilaterally  Cardiovascular: Auscultation of heart with regular rate and rhythm   Musculoskeletal. Muscle bulk and tone normal                                                           Muscle strength lifting all limbs equally                                                                                 No dysmetria or dysdiadokinesis  No tremor   Normal fine motor  Orientation Alert and oriented x 2   Attention and concentration reduced  Short term memory reduced  Language process and speech normal   Cranial nerve 2 normal acuety and visual fields  Cranial nerve 3, 4 and 6 . Extraocular muscles are intact . Pupils are equal and reactive   Cranial nerve 5 . Intact corneal reflex. Normal facial sensation  Cranial nerve 7 normal exam   Cranial nerve 8. Grossly intact hearing   Cranial nerve 9 and 10. Symmetric palate elevation   Cranial nerve 11 , 5 out of 5 strength   Cranial Nerve 12 midline tongue . No atrophy  Sensation . Normal pinprick and light touch   Deep Tendon Reflexes symmetrical  Plantar response flexor bilaterally    Assessment :    Substance abuse with drug overdose . Cocaine use .  Seizure disorder     Plan:    SNF appears more optimal per PT recommendations normal.   Nose: Nose normal.   Mouth/Throat:       Eyes: Pupils are equal, round, and reactive to light. Conjunctivae and EOM are normal.   Neck: Normal range of motion. Pulmonary/Chest: Effort normal. No respiratory distress. Musculoskeletal: Normal range of motion. Neurological: She is alert and oriented to person, place, and time. Skin: Skin is warm and dry. Rash noted. Rash is pustular. She is not diaphoretic. There is erythema. Psychiatric: She has a normal mood and affect. Her behavior is normal. Judgment and thought content normal.   Nursing note and vitals reviewed. Diagnostic Study Results   Labs -   No results found for this or any previous visit (from the past 12 hour(s)). Radiologic Studies -   No orders to display     No results found. Medical Decision Making   I am the first provider for this patient. I reviewed the vital signs, available nursing notes, past medical history, past surgical history, family history and social history. Vital Signs-Reviewed the patient's vital signs. Patient Vitals for the past 12 hrs:   Temp Pulse Resp BP SpO2   09/16/19 1619 98 °F (36.7 °C) 85 20 (!) 145/95 95 %     Pulse Oximetry Analysis - 95% on RA    Records Reviewed: Nursing Notes, Old Medical Records, Previous Radiology Studies and Previous Laboratory Studies    Provider Notes (Medical Decision Making):   46-year-old female presents with swelling and drainage around left upper lip piercing. Differential includes cellulitis, abscess, retained foreign body. Plan to provide nerve block and explore wound to remove retained piercing. Will also prescribe antibiotics to cover for secondary infection. ED Course:   Initial assessment performed. The patients presenting problems have been discussed, and they are in agreement with the care plan formulated and outlined with them. I have encouraged them to ask questions as they arise throughout their visit.     Procedure Note - Dental Block:    5:03 PM  Performed by DERIAN Garrison. A infraorbital nerve block was performed on the left side. 2 mL of 1% lidocaine and with epinephrine was injected. Total time at bedside, performing this procedure was 1-15 minutes. The procedure was tolerated well without any complications. Procedure Note - Foreign Body Cavity:  5:02 PM  Performed by: DERIAN Garrison  Foreign body was seen in the left upper lip. Procedural sedation was not used. Foreign body removed with manual manipulation secondary to 0.25 cm superficial incision using #11 blade scalpel overlying piercing without difficulty. Estimated blood loss: 1cc  The procedure took 1-15 minutes, and pt tolerated well. Progress Note:   Updated pt on all returned results and findings. Discussed the importance of proper follow up as referred below along with return precautions. Pt in agreement with the care plan and expresses agreement with and understanding of all items discussed. Disposition:  6:18 PM  I have discussed with patient their diagnosis, treatment, and follow up plan. The patient agrees to follow up as outlined in discharge paperwork and also to return to the ED with any worsening. Jeniffer Valentine PA-C      PLAN:  1. Discharge Medication List as of 9/16/2019  5:37 PM      START taking these medications    Details   clindamycin (CLEOCIN) 300 mg capsule Take 1 Cap by mouth four (4) times daily for 7 days. , Normal, Disp-28 Cap, R-0         CONTINUE these medications which have NOT CHANGED    Details   cetirizine (ZYRTEC) 10 mg tablet Take 10 mg by mouth daily. , Historical Med, R-2      LYRICA 150 mg capsule Take 1 Cap by mouth two (2) times a day. Max Daily Amount: 300 mg. Prescribed and managed by Dr. Alyson Daigle, [address] [place] [address]., Historical Med, R-3, DOMINICK      traMADol (ULTRAM) 50 mg tablet Take 1 Tab by mouth three (3) times daily as needed.  Max Daily Amount: 150 mg. Prescribed and managed by Dr. Alyson Daigle, [facility] [place]., Historical Med, R-1      cyclobenzaprine (FLEXERIL) 10 mg tablet Take 1 Tab by mouth two (2) times daily as needed for Muscle Spasm(s). Prescribed and managed by Dr. Balaji Forbes, [address] [place] [address]. Indications: Disorder characterized by Stiff, Tender & Painful Muscles, Historical Med      spironolactone (ALDACTONE) 25 mg tablet Take 1 Tab by mouth daily. Prescribed and managed by Dr. Lisa Scanlon, [facility]. , Historical Med      furosemide (LASIX) 80 mg tablet Take 1 Tab by mouth two (2) times a day. Prescribed and managed by Dr. Lisa Scanlon, [facility], Historical Med, R-3      busPIRone (BUSPAR) 10 mg tablet Take 1 Tab by mouth two (2) times a day. Prescribed and managed by Ch[facility], Historical Med, R-0      escitalopram oxalate (LEXAPRO) 10 mg tablet Take 0.5 Tabs by mouth nightly. Prescribed and managed by Chandrakant Love [facility], Historical Med      ARIPiprazole (ABILIFY) 5 mg tablet Take 1 Tab by mouth nightly. Prescribed and managed by [facility]andrakant [facility], Historical Med, R-1      metoprolol succinate (TOPROL-XL) 25 mg XL tablet Take 1 Tab by mouth daily. Prescribed and managed by Dr. Nelsy Ramos. , Historical Med, R-0      diclofenac (VOLTAREN) 1 % gel APPLY TO THE KNEES 4 TIMES A DAY AS NEEDED. Prescribed and managed by Dr. Nelsy Ramos. , Historical Med, R-0      levothyroxine (SYNTHROID) 25 mcg tablet Take 1 Tab by mouth Daily (before breakfast). Prescribed and managed by Dr. Nelsy Ramos. , Historical Med, R-0      ADVAIR DISKUS 500-50 mcg/dose diskus inhaler INHALE ONE PUFF EVERY 12 HOURS., Historical Med, R-0, DOMINICK      montelukast (SINGULAIR) 10 mg tablet TAKE 1 TABLET BY MOUTH AT BEDTIME, Historical Med, R-3      fluticasone (FLONASE) 50 mcg/actuation nasal spray 2 Sprays by Both Nostrils route daily. , Historical Med      topiramate (TOPAMAX) 100 mg tablet Take 100 mg by mouth two (2) times a day., Historical Med      albuterol (PROVENTIL HFA, VENTOLIN HFA, PROAIR HFA) 90 mcg/actuation inhaler Take 2 Puffs by inhalation every four (4) hours as needed for Wheezing., Print, Disp-1 Inhaler, R-1      atorvastatin (LIPITOR) 20 mg tablet Take 40 mg by mouth daily. , Historical Med           2. Follow-up Information     Follow up With Specialties Details Why Contact Info    Jordan Alicia MD Family Practice Schedule an appointment as soon as possible for a visit in 1 week As needed, If symptoms worsen [facility]  [phone]          Return to ED if worse     Diagnosis     Clinical Impression:   1. Foreign body in skin    2. Pierced lip infection            Please note that this dictation was completed with Dragon, computer voice recognition software. Quite often unanticipated grammatical, syntax, homophones, and other interpretive errors are inadvertently transcribed by the computer software. Please disregard these errors. Additionally, please excuse any errors that have escaped final proofreading.

## 2020-06-17 ENCOUNTER — DOCUMENTATION ONLY (OUTPATIENT)
Dept: FAMILY MEDICINE CLINIC | Age: 45
End: 2020-06-17

## 2020-06-17 NOTE — PROGRESS NOTES
[facility] Forms completed and faxed by 06/16/2022 to   (f): [phone]    Confirmation: OK

## 2020-07-13 LAB — ANTIBODY: NORMAL

## 2021-01-18 LAB — MAMMOGRAPHY, EXTERNAL: NORMAL

## 2021-02-02 LAB — PAP SMEAR, EXTERNAL: NORMAL

## 2021-02-18 ENCOUNTER — OFFICE VISIT (OUTPATIENT)
Dept: SURGERY | Age: 46
End: 2021-02-18
Payer: MEDICARE

## 2021-02-18 VITALS
BODY MASS INDEX: 57.52 KG/M2 | TEMPERATURE: 98.5 F | WEIGHT: 293 LBS | OXYGEN SATURATION: 100 % | DIASTOLIC BLOOD PRESSURE: 61 MMHG | HEART RATE: 66 BPM | HEIGHT: 60 IN | SYSTOLIC BLOOD PRESSURE: 135 MMHG

## 2021-02-18 DIAGNOSIS — E66.01 MORBID OBESITY WITH BMI OF 70 AND OVER, ADULT (HCC): ICD-10-CM

## 2021-02-18 DIAGNOSIS — I50.32 CHRONIC HEART FAILURE WITH PRESERVED EJECTION FRACTION (HCC): ICD-10-CM

## 2021-02-18 DIAGNOSIS — I10 HYPERTENSION, UNSPECIFIED TYPE: ICD-10-CM

## 2021-02-18 DIAGNOSIS — G47.33 OSA TREATED WITH BIPAP: ICD-10-CM

## 2021-02-18 DIAGNOSIS — K21.9 GASTROESOPHAGEAL REFLUX DISEASE, UNSPECIFIED WHETHER ESOPHAGITIS PRESENT: ICD-10-CM

## 2021-02-18 DIAGNOSIS — E66.01 MORBID OBESITY (HCC): Primary | ICD-10-CM

## 2021-02-18 DIAGNOSIS — J45.902 ASTHMA WITH STATUS ASTHMATICUS, UNSPECIFIED ASTHMA SEVERITY, UNSPECIFIED WHETHER PERSISTENT: ICD-10-CM

## 2021-02-18 PROCEDURE — 99205 OFFICE O/P NEW HI 60 MIN: CPT | Performed by: NURSE PRACTITIONER

## 2021-02-18 PROCEDURE — G8754 DIAS BP LESS 90: HCPCS | Performed by: NURSE PRACTITIONER

## 2021-02-18 PROCEDURE — G9717 DOC PT DX DEP/BP F/U NT REQ: HCPCS | Performed by: NURSE PRACTITIONER

## 2021-02-18 PROCEDURE — G8752 SYS BP LESS 140: HCPCS | Performed by: NURSE PRACTITIONER

## 2021-02-18 PROCEDURE — G8417 CALC BMI ABV UP PARAM F/U: HCPCS | Performed by: NURSE PRACTITIONER

## 2021-02-18 PROCEDURE — G8427 DOCREV CUR MEDS BY ELIG CLIN: HCPCS | Performed by: NURSE PRACTITIONER

## 2021-02-18 RX ORDER — BUDESONIDE AND FORMOTEROL FUMARATE DIHYDRATE 80; 4.5 UG/1; UG/1
2 AEROSOL RESPIRATORY (INHALATION) 2 TIMES DAILY
COMMUNITY

## 2021-02-18 RX ORDER — OXYBUTYNIN CHLORIDE 5 MG/1
5 TABLET ORAL DAILY
COMMUNITY
Start: 2021-02-05 | End: 2021-04-26 | Stop reason: ALTCHOICE

## 2021-02-18 RX ORDER — AMLODIPINE BESYLATE 10 MG/1
10 TABLET ORAL DAILY
COMMUNITY
Start: 2020-12-22 | End: 2021-06-20

## 2021-02-18 RX ORDER — HYDROXYZINE PAMOATE 100 MG/1
CAPSULE ORAL
COMMUNITY
Start: 2020-11-17 | End: 2022-02-02 | Stop reason: ALTCHOICE

## 2021-02-18 RX ORDER — METOPROLOL TARTRATE 25 MG/1
25 TABLET, FILM COATED ORAL DAILY
COMMUNITY

## 2021-02-18 RX ORDER — LIDOCAINE 50 MG/G
1 PATCH TOPICAL
COMMUNITY
End: 2021-04-26 | Stop reason: ALTCHOICE

## 2021-02-18 RX ORDER — METRONIDAZOLE 500 MG/1
TABLET ORAL
COMMUNITY
Start: 2021-02-10 | End: 2021-04-26 | Stop reason: ALTCHOICE

## 2021-02-18 RX ORDER — OLANZAPINE 10 MG/1
TABLET ORAL
COMMUNITY
Start: 2020-11-16 | End: 2021-04-26 | Stop reason: ALTCHOICE

## 2021-02-18 RX ORDER — MIRTAZAPINE 45 MG/1
TABLET, FILM COATED ORAL
COMMUNITY
Start: 2021-01-29 | End: 2021-04-26 | Stop reason: ALTCHOICE

## 2021-02-18 RX ORDER — PRAZOSIN HYDROCHLORIDE 5 MG/1
CAPSULE ORAL
COMMUNITY
Start: 2020-11-16

## 2021-02-18 NOTE — PROGRESS NOTES
Bariatric Surgery Consultation    Subjective: The patient is a 41 y.o. obese female with a Body mass index is 70.6 kg/m². .  The patient is at her heaviest weight for the past several years. she has been overweight since age29. she has been considering surgery since past 6 years. she desires surgery at this time because of multiple health concerns and their lifestyle issues which are hindered by their weight. she has been referred by his family physician Dr Radha Reyna for evaluation and treatment of their obesity via surgical intervention. Olivier Cobb has tried multiple diets in her lifetime most recently tried physician supervised, behavior modification, unsupervised diets, Weight Watchers, Atkins and Wellbutrin. She initially consulted with our practice in 2014 for a sleeve gastrectomy and completed her nutrition counseling, but did not proceed with surgeru due to 'life stressors' that ultimately led her to moved out of the [facility] for several year. She returns now to restart the process. Of note, her BMI has increased from 64 to 70 and she is now wheelchair bound due to DJD in her knees.     Bariatric comorbidities present are   Patient Active Problem List   Diagnosis Code    Pulmonary sarcoidosis ([facility] D86.0    Hypertension I10    Bipolar disorder ([facility] F31.9    Asthma J45.909    GEORGE treated with BiPAP G47.33    GERD (gastroesophageal reflux disease) K21.9    Arthritis M19.90    Morbid obesity ([facility] E66.01    COPD (chronic obstructive pulmonary disease) ([facility]) J44.9    Chronic heart failure with preserved ejection fraction ([facility]) I50.32    Cardiomyopathy ([facility]) I42.9    Chronic pain G89.29    Sarcoidosis D86.9    Obstructive sleep apnea G47.33    Morbid obesity with BMI of 70 and over, adult ([facility]) E66.01, Z68.45    Smoker F17.200    Irregular menses N92.6    Fibromyalgia M79.7    Carpal tunnel syndrome G56.00    TOM (stress urinary incontinence, female) N39.3    Migraines G43.909  Uses powered wheelchair Z99.3    Chronic back pain M54.9, G89.29    DJD (degenerative joint disease) of knee M17.10       The patient is considering laparoscopic sleeve gastrectomy for surgical weight loss due to their ineffective progress with medical forms of weight loss and the urging of their physician who cares for their primary medical issues. The patient  now presents  for consideration for weight loss surgery understanding the benefits of this over a medical approach of weight loss as was discussed in our presentation on weight loss surgery. They have discussed their plans both with their family and primary care physician who is in support of their pursuit of such. The patient has not had health issues as of late and denies and gastrointestinal disturbances other than what is outlined below in their review of symptoms. All of their prior evaluations available by both their PCP's and specialists physicians have been reviewed today either in the Care Everywhere portal or scanned under the media tab. I have spent a large portion of my initial consultation today reviewing the patients current dietary habits which have contributed to their health issues and obesity. I have suggested to them personally a dietary regimen that they can initiate now to help with their status as it pertains to their weight. They understand that the most important aspect of their journey through their weight loss endeavor will be their adherence to a new lifestyle of healthy eating behavior. They also understand that an adherence to an exercise program will not only help with weight loss but is ultimately important in weight maintenance. The patients goal weight is 200 lb. These goals are consistent with expected outcomes of their desired operation. her Medical goals are resolution of these health issues.         Patient Active Problem List    Diagnosis Date Noted    Cardiomyopathy [facility])     Chronic pain     Sarcoidosis     Obstructive sleep apnea     Morbid obesity with BMI of 70 and over, adult ([facility]     Smoker     Irregular menses     Fibromyalgia     Carpal tunnel syndrome     TOM (stress urinary incontinence, female)     Migraines     Uses powered wheelchair     Chronic back pain     DJD (degenerative joint disease) of knee     Chronic heart failure with preserved ejection fraction (HCC)     COPD (chronic obstructive pulmonary disease) ([facility] 02/10/2017    Morbid obesity ([facility]     Asthma     GEORGE treated with BiPAP     GERD (gastroesophageal reflux disease)     Arthritis     Pulmonary sarcoidosis (HCC)     Hypertension     Bipolar disorder ([facility]      Past Surgical History:   Procedure Laterality Date    HX HERNIA REPAIR      [facility] repair age 16   [de-identified] TONSILLECTOMY        Social History     Tobacco Use    Smoking status: Current Every Day Smoker     Packs/day: 0.25     Years: 23.00     Pack years: 5.75     Types: Cigarettes    Smokeless tobacco: Never Used   Substance Use Topics    Alcohol use: No      Family History   Problem Relation Age of Onset    Hypertension Mother     Psychiatric Disorder Mother     Cancer Father       Current Outpatient Medications   Medication Sig Dispense Refill    amLODIPine (NORVASC) 10 mg tablet Take 10 mg by mouth daily.  budesonide-formoteroL (SYMBICORT) 80-4.5 mcg/actuation HFAA Take 2 Puffs by inhalation two (2) times a day.  hydrOXYzine pamoate (VISTARIL) 100 mg capsule TAKE 1 CAPSULE BY MOUTH 4 TIMES DAILY AS NEEDED FOR ANXIETY      lidocaine (LIDODERM) 5 % 1 Patch by TransDERmal route.  metoprolol tartrate (LOPRESSOR) 25 mg tablet Take 25 mg by mouth daily.  metroNIDAZOLE (FLAGYL) 500 mg tablet       mirtazapine (REMERON) 45 mg tablet       OLANZapine (ZyPREXA) 10 mg tablet TAKE 1 TABLET BY MOUTH ONCE DAILY AT NIGHT      oxybutynin (DITROPAN) 5 mg tablet Take 5 mg by mouth daily.       prazosin (MINIPRESS) 5 mg capsule TAKE ONE AT NIGHT FOR NIGHTMARES      fish oil-omega-3 fatty acids (Fish Oil) 340-1,000 mg capsule Take 500 mg by mouth daily.  cetirizine (ZYRTEC) 10 mg tablet Take 10 mg by mouth daily. 2    cyclobenzaprine (FLEXERIL) 10 mg tablet Take 1 Tab by mouth two (2) times daily as needed for Muscle Spasm(s). Prescribed and managed by Dr. Ale Guthrie, [address] [place] [address]. Indications: Disorder characterized by Stiff, Tender & Painful Muscles      spironolactone (ALDACTONE) 25 mg tablet Take 1 Tab by mouth daily. Prescribed and managed by Dr. Amanuel Doshi, [facility].  furosemide (LASIX) 80 mg tablet Take 1 Tab by mouth two (2) times a day. Prescribed and managed by Dr. Amanuel Doshi, [facility]  3    busPIRone (BUSPAR) 10 mg tablet Take 1 Tab by mouth two (2) times a day. Prescribed and managed by Pauline Perdomo, [facility]  0    diclofenac (VOLTAREN) 1 % gel APPLY TO THE KNEES 4 TIMES A DAY AS NEEDED. Prescribed and managed by Dr. Maria M Lebron. 0    levothyroxine (SYNTHROID) 25 mcg tablet Take 1 Tab by mouth Daily (before breakfast). Prescribed and managed by Dr. Maria M Lebron. 0    montelukast (SINGULAIR) 10 mg tablet TAKE 1 TABLET BY MOUTH AT BEDTIME  3    fluticasone (FLONASE) 50 mcg/actuation nasal spray 2 Sprays by Both Nostrils route daily.  albuterol (PROVENTIL HFA, VENTOLIN HFA, PROAIR HFA) 90 mcg/actuation inhaler Take 2 Puffs by inhalation every four (4) hours as needed for Wheezing. 1 Inhaler 1    atorvastatin (LIPITOR) 20 mg tablet Take 40 mg by mouth daily.        Allergies   Allergen Reactions    Morphine Itching and Swelling    Trazodone Other (comments)     Patient overdosed on medication in the past    Vicodin [Hydrocodone-Acetaminophen] Itching          Review of Systems:       General - No history or complaints of unexpected fever, chills, or weight loss  Head/Neck - No history or complaints of headache, diplopia, dysphagia, hearing loss  Cardiac - No history or complaints of chest pain, palpitations, murmur, or shortness of breath  Pulmonary - No history or complaints of shortness of breath, productive cough, hemoptysis  Gastrointestinal - has reflux controlled by avoiding food triggers,no  abdominal pain, obstipation/constipation or blood per rectum  Genitourinary - No history or complaints of hematuria/dysuria, stress urinary incontinence symptoms, or renal lithiasis  Musculoskeletal - has joint pain in their knees and back,  no muscular weakness  Hematologic - No history or complaints of bleeding disorders,  No blood transfusions  Neurologic - No history or complaints of  migraine headaches, seizure activity, syncopal episodes, TIA or stroke  Integumentary - No history or complaints of rashes, abnormal nevi, skin cancer  Gynecological - irregular menses               Objective:     Visit Vitals  /61 (BP 1 Location: Left lower arm, BP Patient Position: Sitting, BP Cuff Size: Adult)   Pulse 66   Temp 98.5 °F (36.9 °C)   Ht 5' (1.524 m)   Wt (!) 164 kg (361 lb 8 oz)   SpO2 100%   BMI 70.60 kg/m²       Physical Examination: General appearance - alert, well appearing, and in no distress  Mental status - alert, oriented to person, place, and time  Neck - supple, no significant adenopathy  Lymphatics - no palpable lymphadenopathy, no hepatosplenomegaly  Chest - clear to auscultation, no wheezes, rales or rhonchi, symmetric air entry  Heart - normal rate, regular rhythm, normal S1, S2, no murmurs, rubs, clicks or gallops  Abdomen - soft, nontender, nondistended, no masses or organomegaly; large overhanging pannus  Back exam -  no tenderness, palpable spasm or pain on motion  Neurological - alert, oriented, normal speech, no focal findings or movement disorder noted  Musculoskeletal - no joint tenderness, deformity or swelling, in a wheelchair  Extremities - peripheral pulses normal, no pedal edema, no clubbing or cyanosis  Skin - normal coloration and turgor, no rashes, no suspicious skin lesions noted    Reviewed TTE ordered by pulmonology 12/2020 in Care Everywhere:  · There is mild concentric LV hypertrophy with increased (hyperdynamic) LV   systolic function, EF 65 - 70%, and moderate diastolic dysfunction. · Left atrium is moderately dilated. · Mild tricuspid regurgitation with moderate to severely elevated   estimated pulmonary pressures. · The pulmonary artery is mildly dilated. Labs:       No results found for this or any previous visit (from the past 1440 hour(s)). Assessment:     Morbid obesity with comorbidity    Plan:     laparoscopic sleeve gastrectomy    This is a 41 y.o. female with a BMI of Body mass index is 70.6 kg/m². and the weight-related co-morbidties as noted below. Delmy Ahuja meets the NIH criteria for bariatric surgery based upon the BMI of Body mass index is 70.6 kg/m². and multiple weight-related co-morbidties. Delmy Ahuja has elected laparoscopic sleeve gastrectomy as her intervention of choice for treatment of morbid obestiy through surgical means secondary to its safety profile, rapid return to work  and decreases in operative risks over gastric bypass. In the office today, following Rikkieran's history and physical examination, a 30 minute discussion regarding the anatomic alterations for the laparoscopic sleeve gastrectomy was undertaken. The dietary expectations and the patient and physician dependent factors for success were thoroughly discussed, to include the need for interval follow-up and long-term dietary changes associated with success. The possible complications of the sleeve gastrectomy  were also discussed, to include;death, DVT/PE, staple line leak, bleeding, stricture formation, infection, nutritional deficiencies and sleeve dilation.   Specific weight related outcomes for success were also discussed with an emphasis on careful and close follow-up with the first year and eating behavior modification as the baseline and cyclical hunger return. The patient expressed an understanding of the above factors, and her questions were answered in their entirety. In addition, the patient watched a 1.5 hour power point seminar regarding obesity, surgical weight loss including, adjustable gastric band, gastric bypass, and sleeve gastrectomy. This discussion contrasted the different surgical techniques, mechanisms of actions and expected outcomes, and surgical and medical risks associated with each procedure. Today, the patient had all of her questions answered and desires to proceed with  bariatric surgery initially choosing sleeve gastrectomy as her surgical option. I will also place referral for cardiac clearance given history of cardiomyopathy. Even with recent EF, would like cardiac risk stratification prior to surgery in patient with BMI above 70 and multiple comorbidities. Secondary Diagnoses:     Dietary Intervention  - The patient is currently scheduled to see or has been followed by a bariatric nutritionist for an attempt at preoperative weight loss as has been dictated by their insurance carrier. They will be assessed at various times during their follow up to evaluate their progress depending on the length of time that is required once again by their carrier. I have explained the importance of preoperative weight loss and the benefits regarding lower surgical risk and also assisting the patient in reaching their weight loss goal.  Finally they understand there is a physiologic benefit from the standpoint of hepatic volume reduction and reduction of central visceral adiposity preoperatively. I have reiterated the importance of a low carbohydrate and high protein regimen to achieve their stated goal. I have reviewed their current eating behavior prior to this encounter and explained to them in an exhaustive fashion the appropriate diet that they should adhere to.  They have been encouraged to loose weight pre operatively and understand it is our prerogative to cancel surgery or postpone their procedure in the event of significant weight gain. The patients weight loss goal pre operatively is 25 pounds. Hypertension - We will monitor the patients blood pressure while in the hospital and the plan would be to continue those medications postoperatively. If a diuretic is being used we will stop them on discharge to prevent dehydration particularly with the sleeve gastrectomy and the gastric bypass procedures. They will be instructed to monitor their blood pressure postoperatively while at home and notify their primary care physician in the event of any significantly high or uncharacteristic readings     . DVT / Pulmonary Embolus Risk - The patient is at a higher risk for post operative DVT / pulmonary embolus secondary to their morbid obese status, relative sedentary lifestyle, and impending general anesthetic. We will plan to use anticoagulation therapy pre and post operative as well as  pneumatic compression devices and encourage ambulation once on the hospital nursing floor. The need for possible at home anticoagulation therapy has also been discussed and any decision on this matter will be made during post operative evaluations. The patient understands that their efforts at ambulation are of vital importance to reduce the risk of this complication thus placing significant burden on them as to the prevention of such issues. Signs and symptoms of DVT / PE have been discussed with the patient and they have been instructed to call the office if any these occur in the \"at home\" post op phase.      GERD -The patient understands that weight loss surgery is not a guaranteed cure for reflux disease but does understand the benefits that weight loss can have on reflux disease.   They also understand that at the time of surgery the gastroesophageal junction will be evaluated for the presence of a diaphragmatic hernia. Hernias will be corrected always with the gastric band and sleeve gastrectomy procedures, but only on a case by case basis with the gastric bypass if it prevents our ability to perform the operation at hand. The patient also understands that neither weight loss surgery nor repair of a diaphragmatic hernia repair guarantees the complete cessation of the disease. They also understand there is a possibility of recurrence with a simple crural repair as is performed with these procedures. They understand they may have to continue their medications in the postoperative period.     Pseudotumor Cerebri - The patient has a known diagnosis of pseudotumor cerebri which is treated by their  Neurologist.  The patient understands that is a classic co-morbidity of obesity and should improve with weight loss. All home medications related to this diagnosis will be resumed 2-4 weeks posts-op. All changes in medication therapy will be made by patient's  Neurologist.     Weight Related Arthritis -The patient understands the benefits that weight loss surgery can have on their arthritis but also understands that weight loss is not a guaranteed cure and relief of symptoms is often dependent on the severity of the underlying disease. The patient also understands that traditional pharmaceutical treatments for this diagnosis are usually unavailable to post-operative weight loss patients due to the effects on the gastrointestinal tract particularly with the gastric bypass and to a lesser effect with the sleeve gastrectomy. Any changes to the patients medication treatment will ultimately be made the patients PCP with input by our office.     Obstructive Sleep Apnea -The patient understands the association of sleep apnea and obesity and the additional risk that it caries related to post surgical complications. We will have the patient bring their CPAP machine to the hospital for use both postoperatively in the PACU and on the floor at its appropriate setting. We will have them continue using it while at home after surgery and follow up with their pulmonologist 6 months after to be retested to see if it can be discontinued at that time period. She is aware of need to get pulmonary clearance from Dr Keiko Prieto.     Restrictive Airway Disease - We will continue all of their pulmonary medications in the form of oral pills and inhalers in both the perioperative and postoperative period. They understand that their symptoms should improve with weight loss.  Any further testing related to this will be turned over to their family physician or pulmonologist.       Signed By: Mary Paul NP     February 18, 2021

## 2021-03-15 ENCOUNTER — DOCUMENTATION ONLY (OUTPATIENT)
Dept: SURGERY | Age: 46
End: 2021-03-15

## 2021-03-17 ENCOUNTER — APPOINTMENT (OUTPATIENT)
Dept: GENERAL RADIOLOGY | Age: 46
End: 2021-03-17
Attending: SPECIALIST
Payer: MEDICARE

## 2021-03-17 ENCOUNTER — APPOINTMENT (OUTPATIENT)
Dept: SURGERY | Age: 46
End: 2021-03-17

## 2021-03-17 ENCOUNTER — HOSPITAL ENCOUNTER (OUTPATIENT)
Age: 46
Setting detail: OUTPATIENT SURGERY
Discharge: HOME | End: 2021-03-17
Attending: SPECIALIST | Admitting: SPECIALIST
Payer: MEDICARE

## 2021-03-17 VITALS
RESPIRATION RATE: 16 BRPM | OXYGEN SATURATION: 99 % | SYSTOLIC BLOOD PRESSURE: 115 MMHG | BODY MASS INDEX: 71.48 KG/M2 | TEMPERATURE: 98.6 F | WEIGHT: 293 LBS | DIASTOLIC BLOOD PRESSURE: 75 MMHG | HEART RATE: 64 BPM

## 2021-03-17 DIAGNOSIS — E66.01 MORBID OBESITY (HCC): ICD-10-CM

## 2021-03-17 DIAGNOSIS — K21.9 GASTROESOPHAGEAL REFLUX DISEASE, UNSPECIFIED WHETHER ESOPHAGITIS PRESENT: ICD-10-CM

## 2021-03-17 PROCEDURE — 74240 X-RAY XM UPR GI TRC 1CNTRST: CPT | Performed by: SPECIALIST

## 2021-03-17 PROCEDURE — 74240 X-RAY XM UPR GI TRC 1CNTRST: CPT

## 2021-03-17 PROCEDURE — 76040000019: Performed by: SPECIALIST

## 2021-03-17 PROCEDURE — 74011000250 HC RX REV CODE- 250: Performed by: SPECIALIST

## 2021-03-17 NOTE — PROCEDURES
Mayra Tang   : 1975  Medical Record [id number]            PREPROCEDURE DIAGNOSIS: This patient is preoperative for laparoscopic sleeve gastrectomyprocedure with a history of  reflux disease. POSTPROCEDURE DIAGNOSIS: This patient is preoperative for laparoscopic sleeve gastrectomyprocedure with a history of  reflux disease. PROCEDURES PERFORMED: Upper GI study with barium. ESTIMATED BLOOD LOSS: None. SPECIMENS: None. STATEMENT OF MEDICAL NECESSITY: The patient is a patient with a  longstanding history of obesity. They are now considering the laparoscopic sleeve gastrectomyprocedure as a means of surgical weight control and due to their history of reflux disease and are being assessed preoperatively for such. DESCRIPTION OF PROCEDURE: The patient was brought to the fluoroscopy unit and  was given thin barium. On swallowing of barium, they were noted to have  normal peristalsis of their esophagus. They had prompt filling of distal  esophagus with tapering into the gastroesophageal junction. There was no evidence of a hiatal hernia present. Contrast then filled the gastric cardia, fundus,body and pre pyloric region with no abnormalities noted. Contrast then exited the pylorus in normal fashion. No obstruction was noted. There was no evidence of reflux noted.     (normal anatomy - pt will likely be a 2-stage sleeve to bypass after some initial weight loss)    Leander Sylvester MD

## 2021-03-18 NOTE — PROGRESS NOTES
[employer] [facility] [place] [address] [address] [place] [address]      Patient's Name: Hansa Aguayo   Age: 41 y.o. YOB: 1975   Sex: female    Date:  03/15/2021        Session: 1 of  4     Surgeon: Dr. Hugo Villanueva    Height: 5'   Weight:    350     Lbs. BMI: 71       Starting Weight:  361          Do you smoke? Yes, working on stopping cigarettes. Alcohol intake:  I do not drink. Class Guidelines    Guidelines are reviewed with patient at the start of every class. 1. Patient understands that weight loss trial classes must be consecutive. Patient understands if they miss a class, it is their responsibility to contact me to reschedule class. I will reach out to patient after their first no show. 2.  Patient understands the expectations that weight maintenance/weight loss is expected during the classes. Failure to demonstrate changes may result in one extra month of weight loss trial, followed by going back to see the surgeon. Patient understands that they CAN NOT gain any weight during the weight loss trial.  Gaining weight will result in extra classes. 3. Patient is also instructed to be doing their labs, blood work, psych visit, support group and any other test that the surgeon has used while they are working on their weight loss trial.  4.  Patient was instructed to bring their blue binder to every class and appointment. Changes Made Since Last Class:   I'm exercising. Eating better. Eating Habits and Behaviors    Today in class we talked about the box diet principles. Eliminating liquid calories, eating off a smaller plate, using portion control especially with the carbohydrate choices, eating three meals and not skipping breakfast were some of the principles covered. Patient was encouraged to eliminate bread, pasta, potatoes and white rice. Appropriate substitutions were discussed.     Vitamin recommendations following surgery was also covered. Patient's current diet habits include:   Eating three meals a day. I struggle with junk food. Physical Activity/Exercise    Comments: We talked about exercise. Patient was given reasons of why exercise is so important and how that can help with their long-term success. I have encouraged patient to get a support system to help with the activity. Currently for activity, patient is doing workout in the pool. Behavior Modification       Comments:  Behavior modifications were reinforced. This included not eating in front of the TV, which could lead to bigger portions and eating when one is not hungry. We also talked about the importance of eating 3 meals per day. Patient was encouraged to food journal to keep their daily carbohydrates less than 30 grams per meal.      Goals that patient wants to work on includes:  1. Stop smoking  2. Eat right.       Mali Pan RD

## 2021-04-26 ENCOUNTER — OFFICE VISIT (OUTPATIENT)
Dept: SURGERY | Age: 46
End: 2021-04-26
Payer: MEDICARE

## 2021-04-26 VITALS
RESPIRATION RATE: 16 BRPM | OXYGEN SATURATION: 92 % | TEMPERATURE: 97.1 F | HEART RATE: 71 BPM | DIASTOLIC BLOOD PRESSURE: 69 MMHG | WEIGHT: 293 LBS | BODY MASS INDEX: 55.32 KG/M2 | HEIGHT: 61 IN | SYSTOLIC BLOOD PRESSURE: 117 MMHG

## 2021-04-26 DIAGNOSIS — I42.9 CARDIOMYOPATHY, UNSPECIFIED TYPE (HCC): Primary | ICD-10-CM

## 2021-04-26 DIAGNOSIS — F31.9 BIPOLAR AFFECTIVE DISORDER, REMISSION STATUS UNSPECIFIED (HCC): ICD-10-CM

## 2021-04-26 DIAGNOSIS — N92.6 IRREGULAR MENSES: ICD-10-CM

## 2021-04-26 DIAGNOSIS — J45.902 ASTHMA WITH STATUS ASTHMATICUS, UNSPECIFIED ASTHMA SEVERITY, UNSPECIFIED WHETHER PERSISTENT: ICD-10-CM

## 2021-04-26 DIAGNOSIS — I10 ESSENTIAL HYPERTENSION: ICD-10-CM

## 2021-04-26 DIAGNOSIS — E66.01 MORBID OBESITY (HCC): ICD-10-CM

## 2021-04-26 DIAGNOSIS — E66.01 MORBID OBESITY WITH BMI OF 70 AND OVER, ADULT (HCC): ICD-10-CM

## 2021-04-26 DIAGNOSIS — G89.29 OTHER CHRONIC PAIN: ICD-10-CM

## 2021-04-26 DIAGNOSIS — M19.90 ARTHRITIS: ICD-10-CM

## 2021-04-26 DIAGNOSIS — M17.9 OSTEOARTHRITIS OF KNEE, UNSPECIFIED LATERALITY, UNSPECIFIED OSTEOARTHRITIS TYPE: ICD-10-CM

## 2021-04-26 DIAGNOSIS — G43.909 MIGRAINE WITHOUT STATUS MIGRAINOSUS, NOT INTRACTABLE, UNSPECIFIED MIGRAINE TYPE: ICD-10-CM

## 2021-04-26 DIAGNOSIS — D86.0 PULMONARY SARCOIDOSIS (HCC): ICD-10-CM

## 2021-04-26 DIAGNOSIS — F17.200 SMOKER: ICD-10-CM

## 2021-04-26 DIAGNOSIS — I50.32 CHRONIC HEART FAILURE WITH PRESERVED EJECTION FRACTION (HCC): ICD-10-CM

## 2021-04-26 DIAGNOSIS — G47.33 OBSTRUCTIVE SLEEP APNEA: ICD-10-CM

## 2021-04-26 DIAGNOSIS — K21.9 GASTROESOPHAGEAL REFLUX DISEASE, UNSPECIFIED WHETHER ESOPHAGITIS PRESENT: ICD-10-CM

## 2021-04-26 PROCEDURE — G8752 SYS BP LESS 140: HCPCS | Performed by: SPECIALIST

## 2021-04-26 PROCEDURE — G9717 DOC PT DX DEP/BP F/U NT REQ: HCPCS | Performed by: SPECIALIST

## 2021-04-26 PROCEDURE — 99215 OFFICE O/P EST HI 40 MIN: CPT | Performed by: SPECIALIST

## 2021-04-26 PROCEDURE — G8417 CALC BMI ABV UP PARAM F/U: HCPCS | Performed by: SPECIALIST

## 2021-04-26 PROCEDURE — G8428 CUR MEDS NOT DOCUMENT: HCPCS | Performed by: SPECIALIST

## 2021-04-26 PROCEDURE — G8754 DIAS BP LESS 90: HCPCS | Performed by: SPECIALIST

## 2021-04-26 NOTE — PROGRESS NOTES
Pre-Operative Progress Consultation    Subjective:     Trinh Estrada is a 41 y.o. obese female with a Body mass index is 69.8 kg/m². Marie Dues she desires surgery at this time because of health issues and quality of life issues. Trinh Estrada has tried multiple diets in her lifetime most recently tried physician supervised, behavior modification and unsupervised diets. She restarted the program in Feb (with DENVER Wilburn) after abandoning the process in 2014. Bariatric comorbidities present are   Patient Active Problem List   Diagnosis Code    Pulmonary sarcoidosis ([facility] D86.0    Hypertension I10    Bipolar disorder ([facility] F31.9    Asthma J45.909    GEORGE treated with BiPAP G47.33    GERD (gastroesophageal reflux disease) K21.9    Arthritis M19.90    Morbid obesity ([facility] E66.01    COPD (chronic obstructive pulmonary disease) ([facility]) J44.9    Chronic heart failure with preserved ejection fraction ([facility]) I50.32    Cardiomyopathy ([facility]) I42.9    Chronic pain G89.29    Sarcoidosis D86.9    Obstructive sleep apnea G47.33    Morbid obesity with BMI of 70 and over, adult ([facility] E66.01, Z68.45    Smoker F17.200    Irregular menses N92.6    Fibromyalgia M79.7    Carpal tunnel syndrome G56.00    TOM (stress urinary incontinence, female) N39.3    Migraines G43.909    Uses powered wheelchair Z99.3    Chronic back pain M54.9, G89.29    DJD (degenerative joint disease) of knee M17.10     The patient desires laparoscopic sleeve gastrectomy for surgical weight loss. Trinh Estrada is here today to check progress with weight loss / evaluate nutritional status and review all subspecialty clearances in hopes of proceeding to the operating room.      Patient Active Problem List    Diagnosis Date Noted    Cardiomyopathy [facility])     Chronic pain     Sarcoidosis     Obstructive sleep apnea     Morbid obesity with BMI of 70 and over, adult ([facility]     Smoker     Irregular menses     Fibromyalgia     Carpal tunnel syndrome     TOM (stress urinary incontinence, female)     Migraines     Uses powered wheelchair     Chronic back pain     DJD (degenerative joint disease) of knee     Chronic heart failure with preserved ejection fraction (HCC)     COPD (chronic obstructive pulmonary disease) ([facility] 02/10/2017    Morbid obesity ([facility]     Asthma     GEORGE treated with BiPAP     GERD (gastroesophageal reflux disease)     Arthritis     Pulmonary sarcoidosis (HCC)     Hypertension     Bipolar disorder ([facility]       Past Surgical History:   Procedure Laterality Date    HX HERNIA REPAIR      [facility] repair age 16   [de-identified] TONSILLECTOMY        Social History     Tobacco Use    Smoking status: Current Every Day Smoker     Packs/day: 0.25     Years: 23.00     Pack years: 5.75     Types: Cigarettes    Smokeless tobacco: Never Used   Substance Use Topics    Alcohol use: No      Family History   Problem Relation Age of Onset    Hypertension Mother     Psychiatric Disorder Mother     Cancer Father       Current Outpatient Medications   Medication Sig Dispense Refill    amLODIPine (NORVASC) 10 mg tablet Take 10 mg by mouth daily.  budesonide-formoteroL (SYMBICORT) 80-4.5 mcg/actuation HFAA Take 2 Puffs by inhalation two (2) times a day.  hydrOXYzine pamoate (VISTARIL) 100 mg capsule TAKE 1 CAPSULE BY MOUTH 4 TIMES DAILY AS NEEDED FOR ANXIETY      metoprolol tartrate (LOPRESSOR) 25 mg tablet Take 25 mg by mouth daily.  prazosin (MINIPRESS) 5 mg capsule TAKE ONE AT NIGHT FOR NIGHTMARES      fish oil-omega-3 fatty acids (Fish Oil) 340-1,000 mg capsule Take 500 mg by mouth daily.  cetirizine (ZYRTEC) 10 mg tablet Take 10 mg by mouth daily. 2    spironolactone (ALDACTONE) 25 mg tablet Take 1 Tab by mouth daily. Prescribed and managed by Dr. Linda Samuels, [facility].  furosemide (LASIX) 80 mg tablet Take 1 Tab by mouth two (2) times a day.  Prescribed and managed by Dr. Linda Samuels, [facility]  3    busPIRone (BUSPAR) 10 mg tablet Take 1 Tab by mouth two (2) times a day. Prescribed and managed by Melissa Perez, [facility]  0    levothyroxine (SYNTHROID) 25 mcg tablet Take 1 Tab by mouth Daily (before breakfast). Prescribed and managed by Dr. Ant Funez. 0    montelukast (SINGULAIR) 10 mg tablet TAKE 1 TABLET BY MOUTH AT BEDTIME  3    fluticasone (FLONASE) 50 mcg/actuation nasal spray 2 Sprays by Both Nostrils route daily.  albuterol (PROVENTIL HFA, VENTOLIN HFA, PROAIR HFA) 90 mcg/actuation inhaler Take 2 Puffs by inhalation every four (4) hours as needed for Wheezing. 1 Inhaler 1    atorvastatin (LIPITOR) 20 mg tablet Take 40 mg by mouth daily.  cyclobenzaprine (FLEXERIL) 10 mg tablet Take 1 Tab by mouth two (2) times daily as needed for Muscle Spasm(s). Prescribed and managed by Dr. Jacki Ortiz, [address] [place] [address]. Indications: Disorder characterized by Stiff, Tender & Painful Muscles      diclofenac (VOLTAREN) 1 % gel APPLY TO THE KNEES 4 TIMES A DAY AS NEEDED. Prescribed and managed by Dr. Ant Funez.   0     Allergies   Allergen Reactions    Morphine Itching and Swelling    Trazodone Other (comments)     Patient overdosed on medication in the past    Vicodin [Hydrocodone-Acetaminophen] Itching          Review of Systems:        General - No history or complaints of unexpected fever, chills, or weight loss  Head/Neck - No history or complaints of headache, diplopia, dysphagia, hearing loss  Cardiac - No history or complaints of chest pain, palpitations, murmur, or shortness of breath  Pulmonary - No history or complaints of shortness of breath, productive cough, hemoptysis  Gastrointestinal - No history or complaints of reflux,  abdominal pain, obstipation/constipation, blood per rectum  Genitourinary - No history or complaints of hematuria/dysuria, stress urinary incontinence symptoms, or renal lithiasis  Musculoskeletal - No history or complaints of joint pain or muscular weakness  Hematologic - No history or complaints of bleeding disorders, blood transfusions, sickle cell anemia  Neurologic - No history or complaints of  migraine headaches, seizure activity, syncopal episodes, TIA or stroke  Integumentary - No history or complaints of rashes, abnormal nevi, skin cancer  Gynecological - No history of heavy menses/abnormal menses    Objective:     Visit Vitals  /69 (BP 1 Location: Right arm, BP Patient Position: Sitting, BP Cuff Size: Adult)   Pulse 71   Temp 97.1 °F (36.2 °C) (Temporal)   Resp 16   Ht 5' 1\" (1.549 m)   Wt (!) 167.6 kg (369 lb 6.4 oz)   SpO2 92%   BMI 69.80 kg/m²     Physical Examination: General appearance - alert, well appearing, and in no distress  Mental status - alert, oriented to person, place, and time  Neck - supple, no significant adenopathy  Lymphatics - no palpable lymphadenopathy, no hepatosplenomegaly  Chest - clear to auscultation, no wheezes, rales or rhonchi, symmetric air entry  Heart - normal rate, regular rhythm, normal S1, S2, no murmurs, rubs, clicks or gallops  Abdomen - soft, nontender, nondistended, no masses or organomegaly; large overhanging pannus  Back exam -  no tenderness, palpable spasm or pain on motion  Neurological - alert, oriented, normal speech, no focal findings or movement disorder noted  Musculoskeletal - no joint tenderness, deformity or swelling,  Extremities - peripheral pulses normal, no pedal edema, no clubbing or cyanosis  Skin - normal coloration and turgor, no rashes, no suspicious skin lesions noted    Labs:             Assessment:     Morbid obesity with associated comorbidity     Plan:     Continuation of Pre-Operative evaluation / clearance. Valentina Francisco Javier has returned to the office today to discuss her status as a surgical candidate.  her progress has been noted and reviewed.   We will continue the pre-operative process and work towards goals as outlined. she has several more pounds to lose before proceeding to the OR. (8 pounds gained since initial consult visit 2+ months ago)  she has 3 more nutritional visits to complete before proceeding to the OR  she has an outstanding pulmonary and cardiac clearance to review before proceeding to the OR. Graeme Bolanos understand the rationales for all the above. It has been discussed that given her obese condition that the best surgical option for this patient would be the 2-stage sleeve to bypass. Graeme Bolanos agrees with the surgical choice and has been educated in it's; risks, benefits, and alternatives. We will continue with the pre-operative evaluation as needed to check progress. The patient understands the plan of action    Since restarting the process in Feb she has gained 8 lbs and completed 1 of 4 dietary visits. She was unaware that DENVER Wilburn ordered a cardiac clearance with Dr. Antonia Roach at the time of her Feb consult - she states nobody from a \"heart doctor office\" has contacted her. Office staff with help facilitate this. She has yet to complete a psych evaluation    She has decreased her smoking to a status of \"a pack lasts four days\"    Recent sleep apnea titration as below;     TEST:  Nocturnal Polysomnogram with CPAP/BiPAP Titration (Split Night Study) from 3/12/2021    INTERPRETATION:  1. Obstructive sleep apnea-hypopnea syndrome, markedly severe. 2. Effective treatment with continuous positive airway pressure at a pressure of 16.  3. Asthma. 4. Tobacco use disorder. 5. Question sarcoidosis. 6. Heart failure, preserved ejection fraction. 7. Morbid obesity. RECOMMENDATIONS:  1. We will meet with the patient soon to discuss the study result. 2. We will order a CPAP device at a pressure of 16 with heated humidity and C-Flex as soon as possible. She was fitted with a small Respironics DreamWear full face mask interface.   3. Sleep hygiene issues will be reinforced. Should try to target 7-8 hours of sleep per night consistently with CPAP in place. 4. Efforts at weight reduction through diet and exercise is imperative. There has been some discussion about gastric bypass surgery evaluation. 5. Complete smoking cessation will be emphasized. 6. Maintain alertness at all times with driving and operating heavy machinery and avoid if at all feeling drowsy. 7. Letter to Dr. Moriah Munoz following the visit with the patient. cc: MD Corey Edouard NP Bonna Patella, MD    D:  03/12/2021 12:05:28      JOB#:  [id number]  T:  03/12/2021 12:24:26  DOC#:  [id number]  EAW/MODL  Electronically Signed By: Karen Fernandez MD 3/15/2021 7:37 AM     Reviewed TTE ordered by pulmonology 12/2020 in Care Everywhere:  · There is mild concentric LV hypertrophy with increased (hyperdynamic) LV   systolic function, EF 65 - 70%, and moderate diastolic dysfunction. · Left atrium is moderately dilated. · Mild tricuspid regurgitation with moderate to severely elevated   estimated pulmonary pressures. · The pulmonary artery is mildly dilated. Secondary Diagnoses:     Dietary Intervention  - The patient is currently scheduled to see or has been followed by a bariatric nutritionist for an attempt at preoperative weight loss as has been dictated by their insurance carrier. They will be assessed at various times during their follow up to evaluate their progress depending on the length of time that is required once again by their carrier. I have explained the importance of preoperative weight loss and the benefits regarding lower surgical risk and also assisting the patient in reaching their weight loss goal.  Finally they understand their is a physiologic benefit from the standpoint of hepatic volume reduction preoperatively.   I have reiterated the importance of a low carbohydrate and high protein regimen to achieve their stated goal.      Signed By: Onofre Shaffer Bria Abdi MD     April 26, 2021

## 2021-04-26 NOTE — PATIENT INSTRUCTIONS
Patient Instructions 1. Continue to monitor carbohydrate and protein intake- remember to keep your           total  carbohydrates to 50 grams or less per day for best results. 2. Remember hydration goals - usually 48 to 64 ounces of liquids per day 3. Continue to work towards exercise goals - minimum 3 days per week of 45          minutes to  1 hour at a time. 4. Remember to take vitamins as directed Supplement Resource Guide Importance of Protein:  
Maintains lean body mass, produces antibodies to fight off infections, heals wounds, minimizes hair loss, helps to give you energy, helps with satiety, and keeping you full between meals. Importance of Calcium: 
Needed for healthy bones and teeth, normal blood clotting, and nervous system functioning, higher risk of osteoporosis and bone disease with non-compliance. Importance of Multivitamins: Many functions. Supply you with extra nutrients that you may be missing from food. May lead to iron deficiency anemia, weakness, fatigue, and many other symptoms with non-compliance. Importance of B Vitamins: 
Important for red blood cell formation, metabolism, energy, and helps to maintain a healthy nervous system. Protein Supplement Find one you like now. Use immediately after surgery. Look for: 
35-50g protein each day from your protein supplement once you reach the progression diet. 0-3 g fat per serving 0-3 g sugar per serving Protein drinks should be split in separate dosages. Recommend: Lifelong 1 year + Calcium Supplement:  
 
Start taking within a month after surgery. Look for: Calcium Citrate Plus D (1500 mg per day) Recommend: Citracal 
 
 . Avoid chocolate chewable calcium. Can use chewable bariatric or [employer] brand or similar chewable. The body cannot absorb more than 500-600 mg @ a time.  
 
 
Take for Life Multi-vitamin Supplement:   
 
1st Month After Surgery: Any complete chewable, such as: Coras Complete chewables. Avoid Cora sours or gummies. They lack iron and other important nutrients and also have added sugar. Continue with chewable vitamin or change to adult complete multivitamin one month after surgery. Menstruating women can take a prenatal vitamin. Make sure has at least 18 mg iron and 262-515 mcg folic acid): Vitamin B12, B Complex Vitamin, and Biotin Start taking within a month after surgery. Vitamin B12:  1000 mcg of Vitamin B12 three times weekly Must take sublingually (meaning you take it under your tongue) or in a liquid drop form for easy absorption. B Complex Vitamin: Take a pill or liquid drop form once daily. Biotin: This vitamin can help prevent hair loss. Recommend 5mg  
(5000 mcg) a day Biotin is Optional

## 2021-04-29 ENCOUNTER — OFFICE VISIT (OUTPATIENT)
Dept: SURGERY | Age: 46
End: 2021-04-29

## 2021-04-29 DIAGNOSIS — E66.01 MORBID OBESITY (HCC): Primary | ICD-10-CM

## 2021-05-04 VITALS — WEIGHT: 293 LBS | BODY MASS INDEX: 55.32 KG/M2 | HEIGHT: 61 IN

## 2021-05-04 NOTE — PROGRESS NOTES
Medical Weight Loss Multi-Disciplinary Program    Name: Graeme Bolanos   : 1975    Session# 2  Pt attended in-person class. Weight obtained in office. Date: 2021    Visit Vitals  Ht 5' 1\" (1.549 m)   Wt (!) 166.6 kg (367 lb 4.8 oz)   BMI 69.40 kg/m²         Dietary Instructions    Reviewed intake  Understanding low carbohydrates, low sugar, higher protein meals  Instruction given for personal dietary changes  Discussed perceived compliance  Comments: RD Reviewed Diet History and Physical Activity/Exercise habits. Recommended dietary changes for both before and after surgery. Reviewed recommendation to follow 8844-5860 calorie diet, working to reduce total carbohydrate intake to  g or less per day and increasing protein intake to  g per day, compared current intake to recommendations. Recommend pt adopt an exercise routine of at least 3-5 days a week for at least 30 minutes/day. If pt unable to participate in walking, tarsha, swimming, or other exercises, recommend pt work with a physical therapist and/or participate in chair exercises, yoga, and/or increase activities of daily living as able. Patient participated in pre-recorded education class video. Recorded video of dietitian discussing role of low carbohydrate diet for promoting weight loss before and after surgery. Reviewed sources of carbohydrates, label reading tips and reviewed exchange list for carbohydrates. Discussed ways to reduce carbohydrates and reviewed example day of high carbohydrate vs. Low carbohydrate diet. This class reviewed materials provided at patients initial appointment and provided in education packet to patient. Patient watched the video in its entirety and turned in the 3 embedded passcodes from the video session.       Behavior Modification    Identify obstacles to trigger change  Achieving/Rewarding goals met  Positive attitude  Comments: Reinforced importance continuing to modify lifestyle patterns and behaviors to promote weight loss and long term weight maintenance     Comments:  Pt instructed to start carbohydrate counting and label reading. Recommend pt start reducing and eliminating sugar-sweetened beverages, concentrated sweets (cakes/candy/cupcakes/poptarts/cereals/etc.), juice, and high-carbohydrate foods. Pt completed Bariatric-specific nutrition questionnaire. RD reviewed answers and provided feedback on responses that align with bariatric recommendations to behavior changes. Provided feedback on recommendations, areas for improvement, and provided positive feedback on areas where pt is making positive behavior changes. Pt questionnaire is included in chart separate from this note. Physical Activity/Exercise      Patient has been educated on the importance of starting a physical activity regimen, reinforced the importance of regular physical activity for total health and weight management. Suggested starting exercise regimen of cardiovascular and resistance training for at least 3-5 days per week for 30-60 minutes. Encouraged pt to set and keep weekly exercise goals. Goals:   1. Work to increase to 3-4 small meals per day, with planned snacks as needed. Recommend following plate method for meal planning - focusing on lean protein, non-starchy vegetables, and measured amounts of starch. - Goal of  g protein and  g carbohydrate per day. - Recommend continuing protein supplement as meal replacement at least 1x/day OR as high protein snack option  2. Increase non caloric fluid to 64 oz per day. Eliminate caffeine, added sugar, carbonation, and straws.               -Continue to work to decrease sugar sweetened beverages - goal of calorie free beverages only              -Must eliminate caffeine prior to surgery and avoid for ~6-8 weeks   -Practice 30:30 rule,  food and flood   3.  Start activity regimen, work to increase ADL  4. Start Complete MVI    Candidate for surgery (per RD):  PENDING

## 2021-05-27 ENCOUNTER — OFFICE VISIT (OUTPATIENT)
Dept: SURGERY | Age: 46
End: 2021-05-27

## 2021-05-27 DIAGNOSIS — E66.01 MORBID OBESITY (HCC): Primary | ICD-10-CM

## 2021-05-28 VITALS — WEIGHT: 293 LBS | BODY MASS INDEX: 55.32 KG/M2 | HEIGHT: 61 IN

## 2021-05-28 NOTE — PROGRESS NOTES
Medical Weight Loss Multi-Disciplinary Program    Name: Esthela Arce   : 1975    Session# 3 Pt attended in-person class. Weight obtained in office. Date: 2021    Visit Vitals  Ht 5' 1\" (1.549 m)   Wt (!) 166.6 kg (367 lb 4.8 oz)   BMI 69.40 kg/m²       Dietary Instructions    Reviewed intake  Understanding low carbohydrates, low sugar, higher protein meals  Instruction given for personal dietary changes  Discussed perceived compliance  Comments: Comments: RD Reviewed Diet History and Physical Activity/Exercise habits. Recommended dietary changes discussed for both before and after surgery. Reviewed recommendation to follow [id number] calorie diet, working to reduce total carbohydrate intake to  g or less per day and increasing protein intake to  g per day, compared current intake to recommendations. Recommend pt adopt an exercise routine of at least 3-5 days a week for at least 30 minutes/day. If pt unable to participate in walking, tarsha, swimming, or other exercises, recommend pt work with a physical therapist and/or participate in chair exercises, yoga, and/or increase activities of daily living as able. Patient participated in pre-recorded education class video. Recorded video of dietitian discussing role of high protein diet for promoting weight loss before and after surgery. Reviewed sources of protein, label reading tips, and ways to measure proteins. Spent portion of education emphasizing the role of protein supplements in the post operative diet. Reviewed label reading for protein shakes and types of protein supplements. Patient watched the video in its entirety and turned in the 3 embedded passcodes from the video session.       Behavior Modification    Identify obstacles to trigger change  Achieving/Rewarding goals met  Positive attitude  Comments: Reinforced importance continuing to modify lifestyle patterns and behaviors to promote weight loss and long term weight maintenance. Comments:  Pt set goals to make sure they are meeting protein recommendations, switch to lean sources of protein, start sampling protein supplements, and read labels for protein content in foods. Pt completed Bariatric-specific nutrition questionnaire. RD reviewed answers and provided feedback on responses that align with bariatric recommendations to behavior changes. Provided feedback on recommendations, areas for improvement, and provided positive feedback on areas where pt is making positive behavior changes. Pt questionnaire is included in chart separate from this note. Physical Activity/Exercise    Discussed Perceived Compliance  Motivation    Patient has been educated on the importance of starting and maintaining a physical activity regimen, reinforced the importance of regular physical activity for total health and weight management. Suggested starting or continuing exercise regimen of cardiovascular and resistance training for at least 3-5 days per week for 30-60 minutes. Recommend pt track progress weekly. Goals:   1. Work to increase to 3-4 small meals per day, with planned snacks as needed. Recommend following plate method for meal planning - focusing on lean protein, non-starchy vegetables, and measured amounts of starch. - Goal of  g protein and  g carbohydrate per day. - Recommend continuing protein supplement as meal replacement at least 1x/day OR as high protein snack option  2. Increase non caloric fluid to 64 oz per day. Eliminate caffeine, added sugar, carbonation, and straws.               -Continue to work to decrease sugar sweetened beverages - goal of calorie free beverages only              -Must eliminate caffeine prior to surgery and avoid for ~6-8 weeks   -Practice 30:30 rule,  food and flood   3.  Start activity regimen, work to increase ADL  4. Start Complete MVI    Candidate for surgery (per RD): PENDING

## 2021-06-28 ENCOUNTER — OFFICE VISIT (OUTPATIENT)
Dept: SURGERY | Age: 46
End: 2021-06-28
Payer: MEDICARE

## 2021-06-28 VITALS
HEIGHT: 61 IN | WEIGHT: 293 LBS | OXYGEN SATURATION: 99 % | DIASTOLIC BLOOD PRESSURE: 61 MMHG | HEART RATE: 67 BPM | RESPIRATION RATE: 16 BRPM | SYSTOLIC BLOOD PRESSURE: 121 MMHG | BODY MASS INDEX: 55.32 KG/M2

## 2021-06-28 DIAGNOSIS — E66.01 MORBID OBESITY (HCC): Primary | ICD-10-CM

## 2021-06-28 DIAGNOSIS — G47.33 OBSTRUCTIVE SLEEP APNEA: ICD-10-CM

## 2021-06-28 DIAGNOSIS — I10 ESSENTIAL HYPERTENSION: ICD-10-CM

## 2021-06-28 DIAGNOSIS — K21.9 GASTROESOPHAGEAL REFLUX DISEASE, UNSPECIFIED WHETHER ESOPHAGITIS PRESENT: ICD-10-CM

## 2021-06-28 DIAGNOSIS — M19.90 ARTHRITIS: ICD-10-CM

## 2021-06-28 DIAGNOSIS — E66.01 MORBID OBESITY WITH BMI OF 70 AND OVER, ADULT (HCC): ICD-10-CM

## 2021-06-28 PROCEDURE — 99214 OFFICE O/P EST MOD 30 MIN: CPT | Performed by: NURSE PRACTITIONER

## 2021-06-28 RX ORDER — OXCARBAZEPINE 300 MG/1
TABLET, FILM COATED ORAL
COMMUNITY
End: 2022-02-02 | Stop reason: ALTCHOICE

## 2021-06-28 RX ORDER — BUPROPION HYDROCHLORIDE 150 MG/1
TABLET, EXTENDED RELEASE ORAL 2 TIMES DAILY
COMMUNITY
End: 2022-02-02 | Stop reason: ALTCHOICE

## 2021-06-28 NOTE — PROGRESS NOTES
Bariatric Surgery Consultation    Subjective:     Brayan Beckham is a 41 y.o. obese female with a Body mass index is 69.67 kg/m². Aletha Mayfield desires surgery at this time because of health issues and quality of life issues. Brayan Beckham has been seen by a bariatric nutritionist and has been placed on an appropriate low carbohydrate diet. The patient desires laparoscopic sleeve gastrectomy for surgical weight loss, however she is not currently a surgical candidate due to pending work up. Brayan Beckham is here today to check progress with weight loss / evaluate nutritional status and review all subspecialty clearances in hopes of proceeding to the operating room. Office visit notes from February 2021 to present have been reviewed. Brayan Beckham has increased fluid intake, is focusing on protein, is eating regularly, is taking a multivitamin & has increased her activity. No recent visits with PCP. No new medications. Has had 2 ED visit since May and reviewed in [facility]. One for abdominal pain/UTI and one for medication adverse effects. Has stress test scheduled for July 1st with Dr Partha Martinez. Quit smoking 3 week ago. Seeing ortho for nerve block in knees mid July.       Patient Active Problem List    Diagnosis Date Noted    Cardiomyopathy [facility])     Chronic pain     Sarcoidosis     Obstructive sleep apnea     Morbid obesity with BMI of 70 and over, adult ([facility]     Smoker     Irregular menses     Fibromyalgia     Carpal tunnel syndrome     TOM (stress urinary incontinence, female)     Migraines     Uses powered wheelchair     Chronic back pain     DJD (degenerative joint disease) of knee     Chronic heart failure with preserved ejection fraction (HCC)     COPD (chronic obstructive pulmonary disease) ([facility] 02/10/2017    Morbid obesity [facility]     Asthma     GEORGE treated with BiPAP     GERD (gastroesophageal reflux disease)     Arthritis     [facility]     Hypertension     Bipolar disorder ([facility]       Past Surgical History:   Procedure Laterality Date    HX HERNIA REPAIR      [facility] repair age 16   [de-identified] TONSILLECTOMY        Social History     Tobacco Use    Smoking status: Current Every Day Smoker     Packs/day: 0.25     Years: 23.00     Pack years: 5.75     Types: Cigarettes    Smokeless tobacco: Never Used   Substance Use Topics    Alcohol use: No      Family History   Problem Relation Age of Onset    Hypertension Mother     Psychiatric Disorder Mother     Cancer Father       Current Outpatient Medications   Medication Sig Dispense Refill    budesonide-formoteroL (SYMBICORT) 80-4.5 mcg/actuation HFAA Take 2 Puffs by inhalation two (2) times a day.  hydrOXYzine pamoate (VISTARIL) 100 mg capsule TAKE 1 CAPSULE BY MOUTH 4 TIMES DAILY AS NEEDED FOR ANXIETY      metoprolol tartrate (LOPRESSOR) 25 mg tablet Take 25 mg by mouth daily.  prazosin (MINIPRESS) 5 mg capsule TAKE ONE AT NIGHT FOR NIGHTMARES      fish oil-omega-3 fatty acids (Fish Oil) 340-1,000 mg capsule Take 500 mg by mouth daily.  cetirizine (ZYRTEC) 10 mg tablet Take 10 mg by mouth daily. 2    cyclobenzaprine (FLEXERIL) 10 mg tablet Take 1 Tab by mouth two (2) times daily as needed for Muscle Spasm(s). Prescribed and managed by Dr. Colin Rankin, [address] [place] [address]. Indications: Disorder characterized by Stiff, Tender & Painful Muscles      furosemide (LASIX) 80 mg tablet Take 1 Tab by mouth two (2) times a day. Prescribed and managed by Dr. Bonita Bergman, [facility]  3    busPIRone (BUSPAR) 10 mg tablet Take 1 Tab by mouth two (2) times a day. Prescribed and managed by Yolette Manzo [facility]  0    diclofenac (VOLTAREN) 1 % gel APPLY TO THE KNEES 4 TIMES A DAY AS NEEDED. Prescribed and managed by Dr. Alec Chung. 0    levothyroxine (SYNTHROID) 25 mcg tablet Take 1 Tab by mouth Daily (before breakfast).  Prescribed and managed by Dr. Brayan Zapien. 0    montelukast (SINGULAIR) 10 mg tablet TAKE 1 TABLET BY MOUTH AT BEDTIME  3    fluticasone (FLONASE) 50 mcg/actuation nasal spray 2 Sprays by Both Nostrils route daily.  albuterol (PROVENTIL HFA, VENTOLIN HFA, PROAIR HFA) 90 mcg/actuation inhaler Take 2 Puffs by inhalation every four (4) hours as needed for Wheezing. 1 Inhaler 1    atorvastatin (LIPITOR) 20 mg tablet Take 40 mg by mouth daily.  spironolactone (ALDACTONE) 25 mg tablet Take 1 Tab by mouth daily. Prescribed and managed by Dr. Jona Evans, [facility].        Allergies   Allergen Reactions    Morphine Itching and Swelling    Trazodone Other (comments)     Patient overdosed on medication in the past    Vicodin [Hydrocodone-Acetaminophen] Itching          Review of Systems:        General - No history or complaints of unexpected fever, chills, or weight loss  Head/Neck - No history or complaints of headache, diplopia, dysphagia, hearing loss  Cardiac - No history or complaints of chest pain, palpitations, murmur, or shortness of breath  Pulmonary - No history or complaints of shortness of breath, productive cough, hemoptysis  Gastrointestinal - occ reflux,  abdominal pain, obstipation/constipation, blood per rectum  Genitourinary - No history or complaints of hematuria/dysuria, stress urinary incontinence symptoms, or renal lithiasis  Musculoskeletal - has joint pain in her knees, no muscular weakness  Hematologic - No history or complaints of bleeding disorders, blood transfusions, sickle cell anemia  Neurologic - No history or complaints of  migraine headaches, seizure activity, syncopal episodes, TIA or stroke  Integumentary - No history or complaints of rashes, abnormal nevi, skin cancer  Gynecological - No history of heavy menses/abnormal menses    Objective:     Visit Vitals  /61 (BP 1 Location: Left upper arm, BP Patient Position: Sitting, BP Cuff Size: Large adult)   Pulse 67   Ht 5' 1\" (1.549 m)   Wt (!) 167.2 kg (368 lb 11.2 oz)   SpO2 99%   BMI 69.67 kg/m²       Physical Exam:      General appearance:  alert, cooperative, no distress, appears stated age   Mental status   alert, oriented to person, place, and time, normal mood, behavior, speech, dress, motor activity, and thought processes   Neck  supple, no significant adenopathy     Lymphatics  no palpable lymphadenopathy, no hepatosplenomegaly   Chest  clear to auscultation, no wheezes, rales or rhonchi, symmetric air entry   Heart  normal rate, regular rhythm, normal S1, S2, no murmurs, rubs, clicks or gallops    Abdomen: soft, nontender, nondistended, no masses or organomegaly   Neurological  alert, oriented, normal speech, no focal findings or movement disorder noted   Musculoskeletal no joint tenderness, deformity or swelling   Extremities peripheral pulses normal, no pedal edema, no clubbing or cyanosis   Skin normal coloration and turgor, no rashes, no suspicious skin lesions noted       Labs:     No results found for this or any previous visit (from the past 2016 hour(s)). ABD CT 6/4/21   REPORT:   There is no evidence of pleural effusion or abnormal pericardial effusion.  The heart is not enlarged.  There is no lung mass, nodule, or pulmonary infiltrates. Liver, spleen, pancreas, adrenal glands, and kidneys are otherwise unremarkable.  Abdominal aorta demonstrates no focal dilatation.  There is no retroperitoneal or mesenteric lymphadenopathy.  There is no ascites or abdominal free air.  Gallbladder is distended without abnormality. Visualized upper GI tract is unremarkable.  Small bowel appears normal.  Appendix appears normal.  Colon is unremarkable.  Bladder is distended without abnormality.  There is no pelvic mass or pelvic free fluid.      Bones are unremarkable.  Subcutaneous tissues and musculature appear normal.      Assessment:     Morbid obesity with associated comorbidity of sleep apnea, hypertension, severe central obesity & outstanding insurance requirements. Plan: To continue current medications & routine follow-up with PCP. Continuation of Pre-Operative evaluation / clearance:  Harris Hall has returned to the office today to discuss her status as a surgical candidate. Progress has been noted and reviewed - including review of notes from dietician. Harris Hall is being compliant with follow-up & recommendations. Harris Hall has 10 more pounds to lose before proceeding to the OR. (1 pounds lost since last visit)  Harris Hall has 2 more nutritional visits to complete before proceeding to the OR. Harris Hall has an outstanding cardiac, psychological and PCP clearance to review before proceeding to the OR. Harris Hall will return in 1 month to continue the pre-operative process and to work towards goals as outlined. Harris Hall understand the rationales for all the above and plans to follow the diet & activity recommendations of the dietician. It has been discussed that given her morbidly obese condition that the best surgical option for this patient would be the 2-stage sleeve to bypass. Harris Hall agrees with the surgical choice and has been educated in it's; risks, benefits, and alternatives. We will continue with the pre-operative evaluation as needed to check progress. Secondary Diagnoses:     Dietary Intervention  - The patient is currently followed by a bariatric nutritionist for an attempt at preoperative weight loss as has been dictated by their insurance carrier. They will be assessed at various times during their follow up to evaluate their progress depending on the length of time that is required once again by their carrier.   I have explained the importance of preoperative weight loss and the benefits regarding lower surgical risk and also assisting the patient in reaching their weight loss goal.  Finally they understand there is a physiologic benefit from the standpoint of hepatic volume reduction preoperatively. I have reiterated the importance of a low carbohydrate and high protein regimen to achieve their stated goal.The patients weight loss goal pre operatively is 10 pounds. Hypertension - The patient has a clear understanding of how weight loss improves hypertension as a whole, but also they understand that there is a significant genetic component to this disease process. We will monitor the patients blood pressure while in the hospital and the plan would be to continue those medications postoperatively.  If a diuretic is being used we will stop them on discharge to prevent dehydration particularly with the sleeve gastrectomy and the gastric bypass procedures.  They will be instructed to monitor their blood pressure postoperatively while at home and notify their primary care physician in the event of any significantly high or uncharacteristic readings. Obstructive Sleep Apnea -The patient understands the association of sleep apnea and obesity and the additional risk that it caries related to post surgical complications. If they have not been tested for sleep apnea and I feel they are at increased risk for this diagnosis, then they will be scheduled for a consultation with a Pulmonologist for such. In the event that they vannessa this diagnosis we will have the patient bring their CPAP machine to the hospital for use both postoperatively in the PACU and on the floor at its appropriate setting.  We will have them continue using it while at home after surgery and follow up with their pulmonologist 6 months after to be retested to see if it can be discontinued at that time period.       Weight Related Arthritis -The patient understands the benefits that weight loss surgery can have on their arthritis but also understands that weight loss is not a guaranteed cure and relief of symptoms is often dependent on the severity of the underlying disease. The patient also understands that traditional pharmaceutical treatments for this diagnosis are usually unavailable to post-operative weight loss patients due to the effects on the gastrointestinal tract. Any changes to the patients medication treatment will ultimately be made the patients PCP with input by our office. Restrictive Airway Disease - We will continue all of their pulmonary medications in the form of oral pills and inhalers in both the perioperative and postoperative period. They understand that their symptoms should improve with weight loss. Any further testing related to this will be turned over to their family physician or pulmonologist. The patient understands that if they require oral or IV steroids in the future that they will notify us. This is particularly important for gastric bypass patients at all times and both sleeve gastrectomy and gastric bypass patients in the 1 month pre op and 1 month post operative period. They understand that inhaled steroids are exempt from this. Pseudotumor cerebri - The pt has a known diagnosis of PTC & is under the care of her neurologist.  The patient understands that this is a classic co-morbidity of obesity and should improve with weight loss. All home rx related to the tx of PTC will be resumed 2-4 weeks post-op. All changes in medication therapy will be made by the patient's neurologist    Smoking Cessation - Today I have counseled the patient extensively regarding smoking cessation. They have been counseled extensively about the detrimental effects of smoking on their weight loss surgical procedure particularly for the gastric bypass and sleeve gastrectomy procedures. They understand that smoking leads to pulmonary issues postoperatively and can lead to gastric ulcers and marginal ulcers in the post bariatric surgery pouch that has been created.   They understand that they must stop smoking prior to surgery or it may affect their ultimate progression to their procedure. Ms. Jessica Pyle has a reminder for a \"due or due soon\" health maintenance. I have asked that she contact her primary care provider for follow-up on this health maintenance.       Signed By: Deja Armas NP     June 28, 2021

## 2021-06-28 NOTE — PATIENT INSTRUCTIONS
Supplement Resource Guide    Importance of Protein:   Maintains lean body mass, produces antibodies to fight off infections, heals wounds, minimizes hair loss, helps to give you energy, helps with satiety, and keeping you full between meals. Importance of Calcium:  Needed for healthy bones and teeth, normal blood clotting, and nervous system functioning, higher risk of osteoporosis and bone disease with non-compliance. Importance of Multivitamins: Many functions. Supply you with extra nutrients that you may be missing from food. May lead to iron deficiency anemia, weakness, fatigue, and many other symptoms with non-compliance. Importance of B Vitamins:  Important for red blood cell formation, metabolism, energy, and helps to maintain a healthy nervous system. Protein Supplement  Find one you like now. Use immediately after surgery. Look for:  35-50g protein each day from your protein supplement once you reach the progression diet. 0-3 g fat per serving  0-3 g sugar per serving    Protein drinks should be split in separate dosages. Recommend: Lifelong  1 year + Calcium Supplement:     Start taking within a month after surgery. Look for: Calcium Citrate Plus D (1500 mg per day)  Recommend: Citracal     .            Avoid chocolate chewable calcium. Can use chewable bariatric or [employer] brand or similar chewable. The body cannot absorb more than 500-600 mg of calcium at a time. Take for Life Multi-vitamin Supplement:      Start immediately after surgery: any complete chewable, such as: Auburns Complete chewables. Avoid Auburn sours or gummies. They lack iron and other important nutrients and also have added sugar. Continue with chewable vitamin or change to adult complete multivitamin one month after surgery. Menstruating women can take a prenatal vitamin.     Make sure has at least 18 mg iron and [id number] mcg folic acid   Vitamin J74, B Complex Vitamin, and Biotin  Start taking within a month after surgery. Vitamin B12:  1000 mcg of Vitamin B12 three times weekly    Must take sublingually (meaning you take it under your tongue) or in a liquid drop form for easy absorption. B Complex Vitamin: Take a pill or liquid drop form once daily. Biotin: This vitamin can help prevent hair loss. Recommend 5mg   (5000 mcg) a day  Biotin is Optional              Learning About Being Physically Active  What is physical activity? Being physically active means doing any kind of activity that gets your body moving. The types of physical activity that can help you get fit and stay healthy include:  · Aerobic or \"cardio\" activities. These make your heart beat faster and make you breathe harder, such as brisk walking, riding a bike, or running. They strengthen your heart and lungs and build up your endurance. · Strength training activities. These make your muscles work against, or \"resist,\" something. Examples include lifting weights or doing push-ups. These activities help tone and strengthen your muscles and bones. · Stretches. These let you move your joints and muscles through their full range of motion. Stretching helps you be more flexible. What are the benefits of being active? Being active is one of the best things you can do for your health. It helps you to:  · Feel stronger and have more energy to do all the things you like to do. · Focus better at school or work. · Feel, think, and sleep better. · Reach and stay at a healthy weight. · Lose fat and build lean muscle. · Lower your risk for serious health problems, including diabetes, heart attack, high blood pressure, and some cancers. · Keep your heart, lungs, bones, muscles, and joints strong and healthy. How can you make being active part of your life? Start slowly. Make it your long-term goal to get at least 30 minutes of exercise on most days of the week. Walking is a good choice.  You also may want to do other activities, such as running, swimming, cycling, or playing tennis or team sports. Pick activities that you likeones that make your heart beat faster, your muscles stronger, and your muscles and joints more flexible. If you find more than one thing you like doing, do them all. You don't have to do the same thing every day. Get your heart pumping every day. Any activity that makes your heart beat faster and keeps it at that rate for a while counts. Here are some great ways to get your heart beating faster:  · Go for a brisk walk, run, or bike ride. · Go for a hike or swim. · Go in-line skating. · Play a game of touch football, basketball, or soccer. · Ride a bike. · Play tennis or racquetball. · Climb stairs. Even some household chores can be aerobicjust do them at a faster pace. Vacuuming, raking or mowing the lawn, sweeping the garage, and washing and waxing the car all can help get your heart rate up. Strengthen your muscles during the week. You don't have to lift heavy weights or grow big, bulky muscles to get stronger. Doing a few simple activities that make your muscles work against, or \"resist,\" something can help you get stronger. For example, you can:  · Do push-ups or sit-ups, which use your own body weight as resistance. · Lift weights or dumbbells or use stretch bands at home or in a gym or community center. Stretch your muscles often. Stretching will help you as you become more active. It can help you stay flexible, loosen tight muscles, and avoid injury. It can also help improve your balance and posture and can be a great way to relax. Be sure to stretch the muscles you'll be using when you work out. It's best to warm your muscles slightly before you stretch them. Walk or do some other light aerobic activity for a few minutes, and then start stretching. When you stretch your muscles:  · Do it slowly. Stretching is not about going fast or making sudden movements.   · Don't push or bounce during a stretch. · Hold each stretch for at least 15 to 30 seconds, if you can. You should feel a stretch in the muscle, but not pain. · Breathe out as you do the stretch. Then breathe in as you hold the stretch. Don't hold your breath. If you're worried about how more activity might affect your health, have a checkup before you start. Follow any special advice your doctor gives you for getting a smart start. Where can you learn more? Go to http://www.gray.com/  Enter [id number] in the search box to learn more about \"Learning About Being Physically Active. \"  Current as of: September 10, 2020               Content Version: 12.8  © 2006-2021 [employer]. Care instructions adapted under license by [employer] (which disclaims liability or warranty for this information). If you have questions about a medical condition or this instruction, always ask your healthcare professional. [address] any warranty or liability for your use of this information.

## 2021-06-29 ENCOUNTER — OFFICE VISIT (OUTPATIENT)
Dept: SURGERY | Age: 46
End: 2021-06-29

## 2021-06-29 DIAGNOSIS — E66.01 MORBID OBESITY (HCC): Primary | ICD-10-CM

## 2021-07-01 VITALS — HEIGHT: 61 IN | WEIGHT: 293 LBS | BODY MASS INDEX: 55.32 KG/M2

## 2021-07-01 NOTE — PROGRESS NOTES
Medical Weight Loss Multi-Disciplinary Program    Name: Flakita Donaldson   : 1975    Session# 4  Date: 2021    Visit Vitals  Ht 5' 1\" (1.549 m)   Wt (!) 167.2 kg (368 lb 11.2 oz)   BMI 69.67 kg/m²       Pt has gained 1.4 lbs lbs since last visit on 21. Dietary Instructions    Reviewed intake  Understanding low carbohydrates, low sugar, higher protein meals  Instruction given for personal dietary changes  Discussed perceived compliance  Comments: RD Reviewed Diet History and Physical Activity/Exercise habits. Recommended dietary changes discussed for both before and after surgery. Reviewed recommendation to follow [id number] calorie diet, working to reduce total carbohydrate intake to  g or less per day and increasing protein intake to  g per day, compared current intake to recommendations. Recommend pt adopt an exercise routine of at least 3-5 days a week for at least 30 minutes/day. If pt unable to participate in walking, tarsha, swimming, or other exercises, recommend pt work with a physical therapist and/or participate in chair exercises, yoga, and/or increase activities of daily living as able. Patient participated in pre-recorded education class video. Recorded video of dietitian discussing key diet principles. Reviewed importance of small structured meals, adequate hydration,  food and fluid, supplementation of vitamins/minerals and protein shakes, also reviewed recommendations for physical activity. Patient has previously received pre-operative education packet that reviewed these topics, but discussed in details the role of dietary and behavior changes to promote optimal long term weight loss following bariatric surgery. Patient watched the video in its entirety and turned in the 3 embedded passcodes from the video session.  Pt completed additional \"homework\" for this visit's session, including a food recall, physical activity summary, and additional nutrition-related responses to questionnaire:    What did you eat yesterday for:  Breakfast: bagel- bowel of cereal  Snack: No snack  Lunch: soup   Snack: fruit  Dinner: salad and a SANDWICH  Snack: -yogurt  Fluids:  water  Are you drinking any sugar-sweetened beverages  Koolaide, ginger-scott, sprite, soda, sweet tea, lemonade, Gatorade, juice, etc. ?  no  Have you sampled any protein supplements yet? If so, list: .yes  How many times a day or week do you eat sweet treats like candy, cake, pastries, sweetened cereal, sweetened coffee drinks, muffins, etc. ?  once every other week  How many times a day or week do you eat fried foods?  none  How many times are you eating restaurant food, fast-food, or take out?  none  How many ounces of fluid (non-calorie, non-sugar) are you drinking per day? 8oz 4 times a day  How many days a week are you exercising? If so  please list type of exercise, days, and amount of time:  none  What changes have you made to your food, nutrition, or exercise over the last several months?  trying to eat slow and health  What nutrition or exercise goals do you have for the upcoming month?  to eat better  Do you have any questions/concerns? .how come im eating rite and my weight goes up and down    I cant really do any exercise because I have bone on bone in both of my knees  So what little exerciseis getting out every day and moving          Behavior Modification    Identify obstacles to trigger change  Achieving/Rewarding goals met  Positive attitude  Comments: Reinforced importance continuing to modify lifestyle patterns and behaviors to promote weight loss and long term weight maintenance     Comments:  During today's lesson discussed post-operative key diet principles and reviewed dietary and behavior changes to begin making now. Pt completed Bariatric-specific nutrition questionnaire.  RD reviewed answers and provided feedback on responses that align with bariatric recommendations to behavior changes. Provided feedback on recommendations, areas for improvement, and provided positive feedback on areas where pt is making positive behavior changes. Pt questionnaire is included in chart separate from this note. All current stated pt questions answered. Physical Activity/Exercise    Discussed Perceived Compliance  Motivation    Patient has been educated on the importance of a physical activity regimen, reinforced the importance of regular physical activity for total health and weight management. Suggested starting or continuing exercise regimen of cardiovascular and resistance training for at least 3-5 days per week for 30-60 minutes. Recommend pt track weekly progress and adherence to recommendations. Goals:   1. Work to increase to 3-4 small meals per day, with planned snacks as needed. Recommend following plate method for meal planning - focusing on lean protein, non-starchy vegetables, and measured amounts of starch. - Goal of  g protein and  g carbohydrate per day. - Recommend continuing protein supplement as meal replacement at least 1x/day OR as high protein snack option  2. Increase non caloric fluid to 64 oz per day. Eliminate caffeine, added sugar, carbonation, and straws.               -Continue to work to decrease sugar sweetened beverages - goal of calorie free beverages only              -Must eliminate caffeine prior to surgery and avoid for ~6-8 weeks   -Practice 30:30 rule,  food and flood   3. Start activity regimen, work to increase ADL  4. Start Complete MVI    Candidate for surgery (per RD): PENDING, pt does not appear to have a good grasp for dietary guidelines, as evidenced by limited fluid intake, lack of physical activity, and inadequate protein and calories. Additionally, pt has gained 7.2 lbs since first consultation on 2/18/21. Recommend pt attend Video 5 class and then schedule a one-on one appt with RD.

## 2021-07-02 ENCOUNTER — TELEPHONE (OUTPATIENT)
Dept: SURGERY | Age: 46
End: 2021-07-02

## 2021-07-02 ENCOUNTER — DOCUMENTATION ONLY (OUTPATIENT)
Dept: SURGERY | Age: 46
End: 2021-07-02

## 2021-07-02 NOTE — TELEPHONE ENCOUNTER
Spoke at length with pt regarding her current dietary deficiencies and weight gain from initial consult to last nutrition visit. Pt reports that she lives in an assisted living facility and that she can't always get the food that she needs for her diet. Pt states that when she isn't feeling hungry, she consumes a [employer] supplement made with unsweetened [place] Milk. Pt states an average of 2 of these shakes/day. Additionally, pt has been consuming Lean Cuisine meals. Spoke with pt regarding why these were not a good option for a bariatric patient, and reviewed the low carbohydrate, high protein diet that pt needs to be following. Stressed to the pt that she needed to be able to be successful on bariatric diet prior to being approved for surgery. Scheduled pt for in-person 90 min nutrition overview class on 7/21/21, where she can ask questions about the diet and where we can assess if she is losing weight and whether she is able to be successful with her dietary needs pre-op. Pt provided with RD contact information for nutritional questions or concerns prior to class.     Tad Gentile MS, RD/LD

## 2021-07-21 ENCOUNTER — OFFICE VISIT (OUTPATIENT)
Dept: SURGERY | Age: 46
End: 2021-07-21

## 2021-07-21 VITALS — WEIGHT: 293 LBS | BODY MASS INDEX: 55.32 KG/M2 | HEIGHT: 61 IN

## 2021-07-21 DIAGNOSIS — E66.01 MORBID OBESITY (HCC): Primary | ICD-10-CM

## 2021-07-21 NOTE — PROGRESS NOTES
[facility]  Multidisciplinary Weight Loss    Name: Radha Jeffries   : 1975    Session# 5, Pt attended in-person class. Weight obtained in office. Date: 2021    Visit Vitals  Ht 5' 1\" (1.549 m)   Wt (!) 164.1 kg (361 lb 12.8 oz)   BMI 68.36 kg/m²       Pt has LOST 6.9 lbs since last nutrition visit on 21. Pt has GAINED 0.3 lbs since initial consult on 21. Dietary Instructions    Pt attended 90 min in-person class. Topics reviewed: Behavior modification techniques, pre-op diet key principles (Including - Understanding low carbohydrates, low sugar, higher protein meals, no meal skipping, protein sources, carbohydrate sources, 64 ounces of non-caloric fluid, etc.). Encouraged pt follow a low carbohydrate, high protein, 1200 kcal diet with no liquids at meal table (for preparation of post-op 30:30 rule recommendation), high protein, 64+ oz no sugar, no caffeine, no carbonation fluids per day. Educated pt on the importance of eating 3 meals/day at regularly scheduled times including breakfast within 1st 1-2 hours of waking. Reviewed working toward 4-6 smaller meals per day, to assist with optimizing protein intake. Suggested patient uses a protein supplement as a meal replacement instead of skipping OR as a between meal high protein snack. Also educated on the importance of including a protein with every meal and snack and reviewed lean sources. Lastly introduced the Plate Method for Meal Planning and reviewed/discussed the importance of limiting and reducing daily carbohydrate intake to 75-100g/day now with an ultimate goal of 30-50g/day pre-op and a very low carbohydrate content post-op. Discussed label reading, tips for measuring carbohydrates, and low carbohydrate- high protein meal and snack ideas. Discussed perceived compliance through recall of knowledge in class.     Dietary Recall:    BREAKFAST: cereal, fruit  LUNCH: [place]  DINNER: protein shake    Consuming sweets, \"handful of gummy bears\"  Consuming 45 g protein/day  Fluids: 8 oz water, 3 protein shakes/wk, 1 caffeinated bverage/day    Exercise: None, pt is wheel-chair bound    Multi-vitamin, mineral supplementation: MVI, Vit B12. Comments:  Recommended dietary changes discussed for both before and after surgery Recommendation to follow 1200 calorie diet, working to reduce total carbohydrate intake to  g or less per day and increasing protein intake to  g per day, with no liquids at meal table (for preparation of post-op 30:30 rule recommendation), and 64+ oz  no sugar, no caffeine, no carbonation fluids per day. Recommend pt adopt an exercise routine of at least 3-5 days per week for at least 30 minutes/day. If pt unable to participate in walking, tarsha, swimming, or other exercises, recommend pt work with a physical therapist and/or participate in chair exercises, yoga, and/or increase activities of daily living as able. Discussed importance of sampling protein supplements, protein supplement label guidelines and popularly selected items. Handouts provided:    Bariatric Surgery Criteria Packet  [employer] Vitamins[employer] Minerals   Post-Op Education Packet   Meal Guide packet   BD Starches guide   Nuts for Nuts? Be Careful!    Protein chart   Protein Content of Foods   \"Carb Counting\"   \"Snack Suggestions\"    Behavior Modification    Identify obstacles to trigger change  Achieving/Rewarding goals met  Positive attitude  Comments: Reinforced importance of modifying lifestyle patterns and behaviors to promote weight loss and long-term weight maintenance. I talked to patient about the importance of taking vitamins post op and we reviewed the vitamins that patients will be taking post op. Encouraged pt to begin taking multivitamin and probiotic pre-op. Goals:   1. Work to increase to 3-4 small meals per day, with planned snacks as needed. Recommend following My Bariatric Plate method for meal planning - focusing on lean protein, non-starchy vegetables, and measured amounts of starch, or 50% protein / 50% non-starchy vegetables by surgery. - Goal of  g protein and  g carbohydrate per day. - Recommend continuing protein supplement as meal replacement at least 1x/day OR as high protein snack option  2. Increase non caloric fluid to 64 oz per day. Eliminate caffeine, added sugar, carbonation, and straws.               -Continue to work to decrease sugar sweetened beverages - goal of calorie free beverages only              -Must eliminate caffeine prior to surgery and avoid for ~6-8 weeks   -Practice 30:30 rule,  food and flood   3. Start activity regimen, work to increase ADL  4. Start Complete MVI and probiotic. Candidate for surgery (per RD): YES - Pt has met insurance requirements, however, I am concerned by pts reported current diet: low fluid intake, and  simple carbohydrate intake. I recommend that pt search out resources for meals that meet bariatric nutrition guidelines since pt is currently living in an assisted living facility and states that she is having issues getting the correct foods. Once this is in place, would encourage pt to return for wt checks and dietary adequacy and compliance until surgery. Pt does not have any questions/concerns at this time. RD contact information provided for future nutrition questions or concerns.      Fani Tony RD ,MS

## 2021-09-27 ENCOUNTER — OFFICE VISIT (OUTPATIENT)
Dept: SURGERY | Age: 46
End: 2021-09-27
Payer: MEDICARE

## 2021-09-27 VITALS
DIASTOLIC BLOOD PRESSURE: 72 MMHG | TEMPERATURE: 98.2 F | WEIGHT: 293 LBS | BODY MASS INDEX: 55.32 KG/M2 | HEIGHT: 61 IN | SYSTOLIC BLOOD PRESSURE: 130 MMHG | HEART RATE: 76 BPM

## 2021-09-27 DIAGNOSIS — G89.29 OTHER CHRONIC PAIN: ICD-10-CM

## 2021-09-27 DIAGNOSIS — F31.9 BIPOLAR AFFECTIVE DISORDER, REMISSION STATUS UNSPECIFIED (HCC): ICD-10-CM

## 2021-09-27 DIAGNOSIS — G47.33 OBSTRUCTIVE SLEEP APNEA: ICD-10-CM

## 2021-09-27 DIAGNOSIS — J44.9 CHRONIC OBSTRUCTIVE PULMONARY DISEASE, UNSPECIFIED COPD TYPE (HCC): ICD-10-CM

## 2021-09-27 DIAGNOSIS — I42.9 CARDIOMYOPATHY, UNSPECIFIED TYPE (HCC): ICD-10-CM

## 2021-09-27 DIAGNOSIS — G47.33 OSA TREATED WITH BIPAP: ICD-10-CM

## 2021-09-27 DIAGNOSIS — F17.200 SMOKER: ICD-10-CM

## 2021-09-27 DIAGNOSIS — E66.01 MORBID OBESITY (HCC): ICD-10-CM

## 2021-09-27 DIAGNOSIS — M17.9 OSTEOARTHRITIS OF KNEE, UNSPECIFIED LATERALITY, UNSPECIFIED OSTEOARTHRITIS TYPE: ICD-10-CM

## 2021-09-27 DIAGNOSIS — E66.01 MORBID OBESITY WITH BMI OF 70 AND OVER, ADULT (HCC): ICD-10-CM

## 2021-09-27 DIAGNOSIS — I10 ESSENTIAL HYPERTENSION: ICD-10-CM

## 2021-09-27 DIAGNOSIS — M19.90 ARTHRITIS: ICD-10-CM

## 2021-09-27 DIAGNOSIS — K21.9 GASTROESOPHAGEAL REFLUX DISEASE, UNSPECIFIED WHETHER ESOPHAGITIS PRESENT: ICD-10-CM

## 2021-09-27 DIAGNOSIS — J45.902 ASTHMA WITH STATUS ASTHMATICUS, UNSPECIFIED ASTHMA SEVERITY, UNSPECIFIED WHETHER PERSISTENT: ICD-10-CM

## 2021-09-27 DIAGNOSIS — N92.6 IRREGULAR MENSES: ICD-10-CM

## 2021-09-27 DIAGNOSIS — R94.39 ABNORMAL STRESS TEST: Primary | ICD-10-CM

## 2021-09-27 DIAGNOSIS — I50.32 CHRONIC HEART FAILURE WITH PRESERVED EJECTION FRACTION (HCC): ICD-10-CM

## 2021-09-27 DIAGNOSIS — D86.0 PULMONARY SARCOIDOSIS (HCC): ICD-10-CM

## 2021-09-27 PROCEDURE — G8417 CALC BMI ABV UP PARAM F/U: HCPCS | Performed by: SPECIALIST

## 2021-09-27 PROCEDURE — G8427 DOCREV CUR MEDS BY ELIG CLIN: HCPCS | Performed by: SPECIALIST

## 2021-09-27 PROCEDURE — 99215 OFFICE O/P EST HI 40 MIN: CPT | Performed by: SPECIALIST

## 2021-09-27 PROCEDURE — G8752 SYS BP LESS 140: HCPCS | Performed by: SPECIALIST

## 2021-09-27 PROCEDURE — G9717 DOC PT DX DEP/BP F/U NT REQ: HCPCS | Performed by: SPECIALIST

## 2021-09-27 PROCEDURE — G8754 DIAS BP LESS 90: HCPCS | Performed by: SPECIALIST

## 2021-09-27 RX ORDER — PREDNISONE 10 MG/1
TABLET ORAL
COMMUNITY
Start: 2021-08-28 | End: 2021-09-27 | Stop reason: ALTCHOICE

## 2021-09-27 RX ORDER — RISPERIDONE 2 MG/1
TABLET, FILM COATED ORAL
COMMUNITY
Start: 2021-08-17 | End: 2022-02-02 | Stop reason: ALTCHOICE

## 2021-09-27 RX ORDER — GABAPENTIN 100 MG/1
100 CAPSULE ORAL 3 TIMES DAILY
COMMUNITY
Start: 2021-09-22 | End: 2021-10-22

## 2021-09-27 RX ORDER — TRAZODONE HYDROCHLORIDE 50 MG/1
TABLET ORAL
COMMUNITY
Start: 2021-09-13 | End: 2022-04-20 | Stop reason: ALTCHOICE

## 2021-09-27 RX ORDER — PENICILLIN V POTASSIUM 500 MG/1
TABLET, FILM COATED ORAL
COMMUNITY
Start: 2021-09-15 | End: 2022-02-02 | Stop reason: ALTCHOICE

## 2021-09-27 NOTE — PROGRESS NOTES
Bariatric Surgery Preoperative Progress Note    Subjective:     Charley Avelar is a 41 y.o. obese female with a Body mass index is 69.95 kg/m². [place] Shea she desires surgery at this time because of health issues and quality of life issues. Charley Avelar has been seen by a bariatric nutritionist and has been placed on an appropriate low carbohydrate diet. The patient desires laparoscopic sleeve gastrectomy for surgical weight loss, however she is here today to review their workup to date. Charley Avelar is here also today to check progress with weight loss / evaluate nutritional status and review all subspecialty clearances in hopes of proceeding to the operating room. The patient has been on Prednisone for the past month due to a medication reaction.   She was just weaned off the prednisone 1-2 weeks ago    Patient Active Problem List    Diagnosis Date Noted    Cardiomyopathy [facility])     Chronic pain     Sarcoidosis     Obstructive sleep apnea     Morbid obesity with BMI of 70 and over, adult ([facility]     Smoker     Irregular menses     Fibromyalgia     Carpal tunnel syndrome     TOM (stress urinary incontinence, female)     Migraines     Uses powered wheelchair     Chronic back pain     DJD (degenerative joint disease) of knee     Chronic heart failure with preserved ejection fraction (HCC)     COPD (chronic obstructive pulmonary disease) ([facility] 02/10/2017    Morbid obesity ([facility]     Asthma     GEORGE treated with BiPAP     GERD (gastroesophageal reflux disease)     Arthritis     Pulmonary sarcoidosis (HCC)     Hypertension     Bipolar disorder ([facility]       Past Surgical History:   Procedure Laterality Date    HX HERNIA REPAIR      [facility] repair age 16   [facility] TONSILLECTOMY        Social History     Tobacco Use    Smoking status: Former Smoker     Packs/day: 0.25     Years: 23.00     Pack years: 5.75     Types: Cigarettes     Quit date: 2021     Years since quittin.3    Smokeless tobacco: Never Used   Substance Use Topics    Alcohol use: No      Family History   Problem Relation Age of Onset    Hypertension Mother     Psychiatric Disorder Mother     Cancer Father       Current Outpatient Medications   Medication Sig Dispense Refill    gabapentin (NEURONTIN) 100 mg capsule Take 100 mg by mouth three (3) times daily.  risperiDONE (RisperDAL) 2 mg tablet       traZODone (DESYREL) 50 mg tablet       buPROPion SR (Wellbutrin SR) 150 mg SR tablet Take  by mouth two (2) times a day.  OXcarbazepine (TrileptaL) 300 mg tablet Take  by mouth.  budesonide-formoteroL (SYMBICORT) 80-4.5 mcg/actuation HFAA Take 2 Puffs by inhalation two (2) times a day.  hydrOXYzine pamoate (VISTARIL) 100 mg capsule TAKE 1 CAPSULE BY MOUTH 4 TIMES DAILY AS NEEDED FOR ANXIETY      metoprolol tartrate (LOPRESSOR) 25 mg tablet Take 25 mg by mouth daily.  prazosin (MINIPRESS) 5 mg capsule TAKE ONE AT NIGHT FOR NIGHTMARES      fish oil-omega-3 fatty acids (Fish Oil) 340-1,000 mg capsule Take 500 mg by mouth daily.  cetirizine (ZYRTEC) 10 mg tablet Take 10 mg by mouth daily. 2    cyclobenzaprine (FLEXERIL) 10 mg tablet Take 1 Tab by mouth two (2) times daily as needed for Muscle Spasm(s). Prescribed and managed by Dr. Conrado Pizarro, [address] [place] [address]. Indications: Disorder characterized by Stiff, Tender & Painful Muscles      spironolactone (ALDACTONE) 25 mg tablet Take 1 Tab by mouth daily. Prescribed and managed by Dr. Alexander Romberg, [facility].  furosemide (LASIX) 80 mg tablet Take 1 Tab by mouth two (2) times a day. Prescribed and managed by Dr. Alexander Romberg, [facility]  3    busPIRone (BUSPAR) 10 mg tablet Take 1 Tab by mouth two (2) times a day. Prescribed and managed by [facility]  0    diclofenac (VOLTAREN) 1 % gel APPLY TO THE KNEES 4 TIMES A DAY AS NEEDED. Prescribed and managed by Dr. Natali Gaspar.   0    levothyroxine (SYNTHROID) 25 mcg tablet Take 1 Tab by mouth Daily (before breakfast). Prescribed and managed by Dr. Jaret Fernandez. 0    montelukast (SINGULAIR) 10 mg tablet TAKE 1 TABLET BY MOUTH AT BEDTIME  3    fluticasone (FLONASE) 50 mcg/actuation nasal spray 2 Sprays by Both Nostrils route daily.  albuterol (PROVENTIL HFA, VENTOLIN HFA, PROAIR HFA) 90 mcg/actuation inhaler Take 2 Puffs by inhalation every four (4) hours as needed for Wheezing. 1 Inhaler 1    atorvastatin (LIPITOR) 20 mg tablet Take 40 mg by mouth daily.       penicillin v potassium (VEETID) 500 mg tablet TAKE 1 TABLET BY MOUTH 4 TIMES DAILY UNTIL GONE (Patient not taking: Reported on 9/27/2021)       Allergies   Allergen Reactions    Morphine Itching and Swelling    Trazodone Other (comments)     Patient overdosed on medication in the past    Vicodin [Hydrocodone-Acetaminophen] Itching          Review of Systems:            General - No history or complaints of unexpected fever, chills, or weight loss  Head/Neck - No history or complaints of headache, diplopia, dysphagia, hearing loss  Cardiac - No history or complaints of chest pain, palpitations, murmur, or shortness of breath  Pulmonary - No history or complaints of shortness of breath, productive cough, hemoptysis  Gastrointestinal - No history or complaints of reflux,  abdominal pain, obstipation/constipation, blood per rectum  Genitourinary - No history or complaints of hematuria/dysuria, stress urinary incontinence symptoms, or renal lithiasis  Musculoskeletal - No history or complaints of joint pain or muscular weakness  Hematologic - No history or complaints of bleeding disorders, blood transfusions, sickle cell anemia  Neurologic - No history or complaints of  migraine headaches, seizure activity, syncopal episodes, TIA or stroke  Integumentary - No history or complaints of rashes, abnormal nevi, skin cancer  Gynecological - normal menses Objective:     Visit Vitals  /72 (BP 1 Location: Left arm, BP Patient Position: Sitting, BP Cuff Size: Adult)   Pulse 76   Temp 98.2 °F (36.8 °C)   Ht 5' 1\" (1.549 m)   Wt (!) 167.9 kg (370 lb 3 oz)   BMI 69.95 kg/m²       Physical Examination: General appearance - oriented to person, place, and time and overweight  Mental status - alert, oriented to person, place, and time, normal mood, behavior, speech, dress, motor activity, and thought processes  Eyes - pupils equal and reactive, extraocular eye movements intact, sclera anicteric, left eye normal, right eye normal  Ears - right ear normal, left ear normal  Neck - supple, no significant adenopathy  Chest - clear to auscultation, no wheezes, rales or rhonchi, symmetric air entry  Heart - normal rate, regular rhythm, normal S1, S2, no murmurs, rubs, clicks or gallops  Abdomen - soft, nontender, nondistended, no masses or organomegaly,dense central obesity noted  Back exam - full range of motion, no tenderness, palpable spasm or pain on motion  Neurological - alert, oriented, normal speech, no focal findings or movement disorder noted  Musculoskeletal - no joint tenderness, deformity or swelling  Extremities - peripheral pulses normal, no pedal edema, no clubbing or cyanosis  Skin - normal coloration and turgor, no rashes, no suspicious skin lesions noted    Labs:     No results found for this or any previous visit (from the past 2016 hour(s)). Point Stress Test from early July    Target HR  bpm 150        Max PA HR   176        Lung/Heart ratio   0.42        Nuc Stress EF  % 75        Nuc Rest EF  % 40        Narrative    · Nondiagnostic stress EKG due to pharmacological protocol. No high-risk   EKG changes with Lexiscan. · LV perfusion is probably abnormal with evidence of inducible ischemia. · There is a medium size, mild severity, reversible defect in the basal to   mid anterior wall consistent with probable ischemia.    · Normal LV wall motion and EF on stress images. Rest images degraded by   motion. Other Result Text    This result has an attachment that is not available. Radha Ravi MD - 07/06/2021  4:14 PM EDT   Formatting of this note might be different from the original.   · Nondiagnostic stress EKG due to pharmacological protocol. No high-risk EKG changes with Lexiscan. · LV perfusion is probably abnormal with evidence of inducible ischemia. · There is a medium size, mild severity, reversible defect in the basal to mid anterior wall consistent with probable ischemia. · Normal LV wall motion and EF on stress images. Rest images degraded by motion. Date: 07/06/21           Assessment:     Morbid obesity with associated comorbidity    Plan:     Continuation of Pre-Operative evaluation / clearance. Airam Toledo has returned to the office today to discuss her status as a surgical candidate.    her progress has been noted and reviewed. We will continue the pre-operative process and work towards goals as outlined. she has 20-25 more pounds to lose before proceeding to the OR.  (+7 pounds gained since last visit)  she has 0 more nutritional visits to complete before proceeding to the OR  she has an outstanding cardiac clearance to review before proceeding to the OR. Airam Toledo understand the rationales for all the above. It has been discussed that given her   condition   that the best surgical option for this patient would be the laparoscopic sleeve gastrectomy. Airam Toledo   agrees with the surgical choice and has been educated in it's; risks, benefits, and alternatives. We will continue   with the pre-operative evaluation as needed to check progress. Secondary Diagnoses:     Abnormal Stress test- Will send to Dr Pranav Snow for evaluation.     Signed By: Pastora Rubi MD     September 27, 2021

## 2021-09-27 NOTE — PATIENT INSTRUCTIONS

## 2021-11-29 ENCOUNTER — HOSPITAL ENCOUNTER (OUTPATIENT)
Dept: LAB | Age: 46
Discharge: HOME | End: 2021-11-29
Payer: MEDICARE

## 2021-11-29 ENCOUNTER — OFFICE VISIT (OUTPATIENT)
Dept: SURGERY | Age: 46
End: 2021-11-29
Payer: MEDICARE

## 2021-11-29 ENCOUNTER — DOCUMENTATION ONLY (OUTPATIENT)
Dept: SURGERY | Age: 46
End: 2021-11-29

## 2021-11-29 VITALS
OXYGEN SATURATION: 99 % | DIASTOLIC BLOOD PRESSURE: 63 MMHG | TEMPERATURE: 97.7 F | WEIGHT: 293 LBS | HEIGHT: 61 IN | SYSTOLIC BLOOD PRESSURE: 114 MMHG | HEART RATE: 69 BPM | BODY MASS INDEX: 55.32 KG/M2

## 2021-11-29 DIAGNOSIS — E66.01 MORBID OBESITY WITH BMI OF 70 AND OVER, ADULT (HCC): ICD-10-CM

## 2021-11-29 DIAGNOSIS — E66.01 MORBID OBESITY (HCC): Primary | ICD-10-CM

## 2021-11-29 DIAGNOSIS — M19.90 ARTHRITIS: ICD-10-CM

## 2021-11-29 DIAGNOSIS — K21.9 GASTROESOPHAGEAL REFLUX DISEASE, UNSPECIFIED WHETHER ESOPHAGITIS PRESENT: ICD-10-CM

## 2021-11-29 DIAGNOSIS — I10 PRIMARY HYPERTENSION: ICD-10-CM

## 2021-11-29 DIAGNOSIS — G47.33 OBSTRUCTIVE SLEEP APNEA: ICD-10-CM

## 2021-11-29 PROCEDURE — G8427 DOCREV CUR MEDS BY ELIG CLIN: HCPCS | Performed by: NURSE PRACTITIONER

## 2021-11-29 PROCEDURE — G8417 CALC BMI ABV UP PARAM F/U: HCPCS | Performed by: NURSE PRACTITIONER

## 2021-11-29 PROCEDURE — G8754 DIAS BP LESS 90: HCPCS | Performed by: NURSE PRACTITIONER

## 2021-11-29 PROCEDURE — 99214 OFFICE O/P EST MOD 30 MIN: CPT | Performed by: NURSE PRACTITIONER

## 2021-11-29 PROCEDURE — G8752 SYS BP LESS 140: HCPCS | Performed by: NURSE PRACTITIONER

## 2021-11-29 PROCEDURE — 83013 H PYLORI (C-13) BREATH: CPT

## 2021-11-29 PROCEDURE — G9717 DOC PT DX DEP/BP F/U NT REQ: HCPCS | Performed by: NURSE PRACTITIONER

## 2021-11-29 NOTE — PATIENT INSTRUCTIONS
GLP-1 (liraglutide, Saxenda, Wegovy)      Supplement Resource Guide    Importance of Protein:   Maintains lean body mass, produces antibodies to fight off infections, heals wounds, minimizes hair loss, helps to give you energy, helps with satiety, and keeping you full between meals. Importance of Calcium:  Needed for healthy bones and teeth, normal blood clotting, and nervous system functioning, higher risk of osteoporosis and bone disease with non-compliance. Importance of Multivitamins: Many functions. Supply you with extra nutrients that you may be missing from food. May lead to iron deficiency anemia, weakness, fatigue, and many other symptoms with non-compliance. Importance of B Vitamins:  Important for red blood cell formation, metabolism, energy, and helps to maintain a healthy nervous system. Protein Supplement  Find one you like now. Use immediately after surgery. Look for:  35-50g protein each day from your protein supplement once you reach the progression diet. 0-3 g fat per serving  0-3 g sugar per serving    Protein drinks should be split in separate dosages. Recommend: Lifelong  1 year + Calcium Supplement:     Start taking within a month after surgery. Look for: Calcium Citrate Plus D (1500 mg per day)  Recommend: Citracal     .            Avoid chocolate chewable calcium. Can use chewable bariatric or [employer] brand or similar chewable. The body cannot absorb more than 500-600 mg of calcium at a time. Take for Life Multi-vitamin Supplement:      Start immediately after surgery: any complete chewable, such as: [place]s Complete chewables. Avoid [place] sours or gummies. They lack iron and other important nutrients and also have added sugar. Continue with chewable vitamin or change to adult complete multivitamin one month after surgery. Menstruating women can take a prenatal vitamin.     Make sure has at least 18 mg iron and 025-423 mcg folic acid   Vitamin B12, B Complex Vitamin, and Biotin  Start taking within a month after surgery. Vitamin B12:  1000 mcg of Vitamin B12 three times weekly    Must take sublingually (meaning you take it under your tongue) or in a liquid drop form for easy absorption. B Complex Vitamin: Take a pill or liquid drop form once daily. Biotin: This vitamin can help prevent hair loss. Recommend 5mg   (5000 mcg) a day  Biotin is Optional              Learning About Being Physically Active  What is physical activity? Being physically active means doing any kind of activity that gets your body moving. The types of physical activity that can help you get fit and stay healthy include:  · Aerobic or \"cardio\" activities. These make your heart beat faster and make you breathe harder, such as brisk walking, riding a bike, or running. They strengthen your heart and lungs and build up your endurance. · Strength training activities. These make your muscles work against, or \"resist,\" something. Examples include lifting weights or doing push-ups. These activities help tone and strengthen your muscles and bones. · Stretches. These let you move your joints and muscles through their full range of motion. Stretching helps you be more flexible. What are the benefits of being active? Being active is one of the best things you can do for your health. It helps you to:  · Feel stronger and have more energy to do all the things you like to do. · Focus better at school or work. · Feel, think, and sleep better. · Reach and stay at a healthy weight. · Lose fat and build lean muscle. · Lower your risk for serious health problems, including diabetes, heart attack, high blood pressure, and some cancers. · Keep your heart, lungs, bones, muscles, and joints strong and healthy. How can you make being active part of your life? Start slowly. Make it your long-term goal to get at least 30 minutes of exercise on most days of the week.  Walking is a good choice. You also may want to do other activities, such as running, swimming, cycling, or playing tennis or team sports. Pick activities that you like--ones that make your heart beat faster, your muscles stronger, and your muscles and joints more flexible. If you find more than one thing you like doing, do them all. You don't have to do the same thing every day. Get your heart pumping every day. Any activity that makes your heart beat faster and keeps it at that rate for a while counts. Here are some great ways to get your heart beating faster:  · Go for a brisk walk, run, or bike ride. · Go for a hike or swim. · Go in-line skating. · Play a game of touch football, basketball, or soccer. · Ride a bike. · Play tennis or racquetball. · Climb stairs. Even some household chores can be aerobic--just do them at a faster pace. Vacuuming, raking or mowing the lawn, sweeping the garage, and washing and waxing the car all can help get your heart rate up. Strengthen your muscles during the week. You don't have to lift heavy weights or grow big, bulky muscles to get stronger. Doing a few simple activities that make your muscles work against, or \"resist,\" something can help you get stronger. For example, you can:  · Do push-ups or sit-ups, which use your own body weight as resistance. · Lift weights or dumbbells or use stretch bands at home or in a gym or community center. Stretch your muscles often. Stretching will help you as you become more active. It can help you stay flexible, loosen tight muscles, and avoid injury. It can also help improve your balance and posture and can be a great way to relax. Be sure to stretch the muscles you'll be using when you work out. It's best to warm your muscles slightly before you stretch them. Walk or do some other light aerobic activity for a few minutes, and then start stretching. When you stretch your muscles:  · Do it slowly.  Stretching is not about going fast or making sudden movements. · Don't push or bounce during a stretch. · Hold each stretch for at least 15 to 30 seconds, if you can. You should feel a stretch in the muscle, but not pain. · Breathe out as you do the stretch. Then breathe in as you hold the stretch. Don't hold your breath. If you're worried about how more activity might affect your health, have a checkup before you start. Follow any special advice your doctor gives you for getting a smart start. Where can you learn more? Go to http://www.gray.com/  Enter [id number] in the search box to learn more about \"Learning About Being Physically Active. \"  Current as of: May 12, 2021               Content Version: 13.0  © 2006-2021 [employer]. Care instructions adapted under license by [employer] (which disclaims liability or warranty for this information). If you have questions about a medical condition or this instruction, always ask your healthcare professional. [address] any warranty or liability for your use of this information.

## 2021-11-29 NOTE — PROGRESS NOTES
Bariatric Surgery Consultation    Subjective:     Herminia Clements is a 40 y.o. obese female with a Body mass index is 69.25 kg/m². Bianca Bian desires surgery at this time because of health issues and quality of life issues. Herminia Clements has been seen by a bariatric nutritionist and has been placed on an appropriate low carbohydrate diet. The patient desires laparoscopic sleeve gastrectomy for surgical weight loss, however she is not currently a surgical candidate due to pending work up. Herminia Clements is here today to check progress with weight loss / evaluate nutritional status and review all subspecialty clearances in hopes of proceeding to the operating room. Office visit notes from February 2021 to present have been reviewed. Herminia Clements has increased fluid intake, is focusing on protein, is eating regularly, is taking a multivitamin & has increased her activity. No recent visits with PCP. No new medications. Off prednisone since mid September    No smoking since March 2021    Has a 20-25 lb weight loss goal. Consult weight 361 lbs. She has gained 5 lbs since that visit 9 months ago. UGI done 3/17/21 showed normal anatomy. Cardiac clearance received and scanned under media tab following abnormal stress test in July.      Patient Active Problem List    Diagnosis Date Noted    Cardiomyopathy [facility])     Chronic pain     Sarcoidosis     Obstructive sleep apnea     Morbid obesity with BMI of 70 and over, adult ([facility] [address]     Smoker     Irregular menses     Fibromyalgia     Carpal tunnel syndrome     TOM (stress urinary incontinence, female)     Migraines     Uses powered wheelchair     Chronic back pain     DJD (degenerative joint disease) of knee     Chronic heart failure with preserved ejection fraction (HCC)     COPD (chronic obstructive pulmonary disease) ([facility] [address] 02/10/2017    Morbid obesity ([facility] [address]     Asthma     GEORGE treated with BiPAP     GERD (gastroesophageal reflux disease)     Arthritis     Pulmonary sarcoidosis (HCC)     Hypertension     Bipolar disorder ([address]       Past Surgical History:   Procedure Laterality Date    HX HERNIA REPAIR      [facility] repair age 16   [de-identified] TONSILLECTOMY        Social History     Tobacco Use    Smoking status: Former Smoker     Packs/day: 0.25     Years: 23.00     Pack years: 5.75     Types: Cigarettes     Quit date: 2021     Years since quittin.4    Smokeless tobacco: Never Used   Substance Use Topics    Alcohol use: No      Family History   Problem Relation Age of Onset    Hypertension Mother     Psychiatric Disorder Mother     Cancer Father       Current Outpatient Medications   Medication Sig Dispense Refill    penicillin v potassium (VEETID) 500 mg tablet TAKE 1 TABLET BY MOUTH 4 TIMES DAILY UNTIL GONE      risperiDONE (RisperDAL) 2 mg tablet       traZODone (DESYREL) 50 mg tablet       buPROPion SR (Wellbutrin SR) 150 mg SR tablet Take  by mouth two (2) times a day.  OXcarbazepine (TrileptaL) 300 mg tablet Take  by mouth.  budesonide-formoteroL (SYMBICORT) 80-4.5 mcg/actuation HFAA Take 2 Puffs by inhalation two (2) times a day.  hydrOXYzine pamoate (VISTARIL) 100 mg capsule TAKE 1 CAPSULE BY MOUTH 4 TIMES DAILY AS NEEDED FOR ANXIETY      metoprolol tartrate (LOPRESSOR) 25 mg tablet Take 25 mg by mouth daily.  prazosin (MINIPRESS) 5 mg capsule TAKE ONE AT NIGHT FOR NIGHTMARES      fish oil-omega-3 fatty acids (Fish Oil) 340-1,000 mg capsule Take 500 mg by mouth daily.  cetirizine (ZYRTEC) 10 mg tablet Take 10 mg by mouth daily. 2    cyclobenzaprine (FLEXERIL) 10 mg tablet Take 1 Tab by mouth two (2) times daily as needed for Muscle Spasm(s). Prescribed and managed by Dr. John Joe, [address] [place] [address]. Indications: Disorder characterized by Stiff, Tender & Painful Muscles      spironolactone (ALDACTONE) 25 mg tablet Take 1 Tab by mouth daily.  Prescribed and managed by Dr. Flakita Ramos, [facility].  furosemide (LASIX) 80 mg tablet Take 1 Tab by mouth two (2) times a day. Prescribed and managed by Dr. hCristophe Leblanc, [facility]  3    busPIRone (BUSPAR) 10 mg tablet Take 1 Tab by mouth two (2) times a day. Prescribed and managed by Manuelito Ying, [facility]  0    diclofenac (VOLTAREN) 1 % gel APPLY TO THE KNEES 4 TIMES A DAY AS NEEDED. Prescribed and managed by Dr. [facility]. 0    levothyroxine (SYNTHROID) 25 mcg tablet Take 1 Tab by mouth Daily (before breakfast). Prescribed and managed by Dr. [facility]. 0    montelukast (SINGULAIR) 10 mg tablet TAKE 1 TABLET BY MOUTH AT BEDTIME  3    fluticasone (FLONASE) 50 mcg/actuation nasal spray 2 Sprays by Both Nostrils route daily.  albuterol (PROVENTIL HFA, VENTOLIN HFA, PROAIR HFA) 90 mcg/actuation inhaler Take 2 Puffs by inhalation every four (4) hours as needed for Wheezing. 1 Inhaler 1    atorvastatin (LIPITOR) 20 mg tablet Take 40 mg by mouth daily.        Allergies   Allergen Reactions    Morphine Itching and Swelling    Trazodone Other (comments)     Patient overdosed on medication in the past    Vicodin [Hydrocodone-Acetaminophen] Itching          Review of Systems:        General - No history or complaints of unexpected fever, chills, or weight loss  Head/Neck - No history or complaints of headache, diplopia, dysphagia, hearing loss  Cardiac - No history or complaints of chest pain, palpitations, murmur, or shortness of breath  Pulmonary - No history or complaints of shortness of breath, productive cough, hemoptysis  Gastrointestinal - has reflux uses H2 blocker prn,  abdominal pain, obstipation/constipation, blood per rectum  Genitourinary - No history or complaints of hematuria/dysuria, stress urinary incontinence symptoms, or renal lithiasis  Musculoskeletal - has joint pain in her knees, has muscular weakness, uses wheelchair  Hematologic - No history or complaints of bleeding disorders, blood transfusions, sickle cell anemia  Neurologic - No history or complaints of  migraine headaches, seizure activity, syncopal episodes, TIA or stroke  Integumentary - No history or complaints of rashes, abnormal nevi, skin cancer  Gynecological - No history of heavy menses/abnormal menses    Objective:     Visit Vitals  /63 (BP 1 Location: Left upper arm, BP Patient Position: Sitting, BP Cuff Size: Large adult)   Pulse 69   Temp 97.7 °F (36.5 °C)   Ht 5' 1\" (1.549 m)   Wt (!) 166.2 kg (366 lb 8 oz)   SpO2 99%   BMI 69.25 kg/m²       Physical Exam:      General appearance:  alert, cooperative, no distress, appears stated age   Mental status   alert, oriented to person, place, and time   Neck  supple, no significant adenopathy     Lymphatics  no palpable lymphadenopathy, no hepatosplenomegaly   Chest  clear to auscultation, no wheezes, rales or rhonchi, symmetric air entry   Heart  normal rate, regular rhythm, normal S1, S2, no murmurs, rubs, clicks or gallops    Abdomen: soft, nontender, nondistended, no masses or organomegaly; large overhanging pannus   Neurological  alert, oriented, normal speech, no focal findings or movement disorder noted   Musculoskeletal no joint tenderness, deformity or swelling   Extremities peripheral pulses normal, no pedal edema, no clubbing or cyanosis   Skin normal coloration and turgor, no rashes, no suspicious skin lesions noted       Labs:     No results found for this or any previous visit (from the past 2016 hour(s)).     6/4/21 CT ABD Pelvis with contrast   REASON FOR EXAM:   generalized abdominal pain, constipation     COMPARISON:   05/13/2021     TECHNIQUE:   5 mm axial CT images of abdomen and pelvis are obtained following the uneventful administration of intravenous contrast.     All CT scans performed at [facility] facilities are performed using dose optimization techniques as appropriate to exam including the following: Automated exposure control, adjustment of the mA or kVp according to patient size, and the use of iterative reconstruction techniques. REPORT:   There is no evidence of pleural effusion or abnormal pericardial effusion.  The heart is not enlarged.  There is no lung mass, nodule, or pulmonary infiltrates. Liver, spleen, pancreas, adrenal glands, and kidneys are otherwise unremarkable.  Abdominal aorta demonstrates no focal dilatation.  There is no retroperitoneal or mesenteric lymphadenopathy.  There is no ascites or abdominal free air.  Gallbladder is distended without abnormality. Visualized upper GI tract is unremarkable.  Small bowel appears normal.  Appendix appears normal.  Colon is unremarkable.  Bladder is distended without abnormality.  There is no pelvic mass or pelvic free fluid. Bones are unremarkable.  Subcutaneous tissues and musculature appear normal.     IMPRESSION:   There is no evidence of acute abdominopelvic disease. Assessment:     Morbid obesity with associated comorbidity of hypertension, sleep apnea, severe central obesity & outstanding insurance requirements. Plan: To continue current medications & routine follow-up with PCP. Continuation of Pre-Operative evaluation / clearance:  Al Bhandari has returned to the office today to discuss her status as a surgical candidate. Progress has been noted and reviewed - including review of notes from dietician. Al Bhandari is being compliant with follow-up & recommendations. Al Bhandari has 25-30 more pounds to lose before proceeding to the OR. (4 pounds lost since last visit)  Al Bhandari has more nutritional visits to complete before proceeding to the OR. Additionally, 1 more medical visits are required. Al Bhandari has no outstanding clearance to review before proceeding to the OR.   Al Bhandari will return in 1 month to continue the pre-operative process and to work towards goals as outlined. Antoni Dockery understand the rationales for all the above and plans to follow the diet & activity recommendations of the dietician. It has been discussed that given her morbidly obese condition that the best surgical option for this patient would be the laparoscopic sleeve gastrectomy followed by gastric bypass at later date. Antoni Dockery agrees with the surgical choice and has been educated in it's; risks, benefits, and alternatives. We will continue with the pre-operative evaluation as needed to check progress. Did discuss speaking with PCP about some possible medical weight loss options, such as a GLP-1. Will also have her meet with our dietitian again since it has been 4 months since last nutrition counseling. Secondary Diagnoses:     Dietary Intervention  - The patient is currently followed by a bariatric nutritionist for an attempt at preoperative weight loss as has been dictated by their insurance carrier. They will be assessed at various times during their follow up to evaluate their progress depending on the length of time that is required once again by their carrier. I have explained the importance of preoperative weight loss and the benefits regarding lower surgical risk and also assisting the patient in reaching their weight loss goal.  Finally they understand there is a physiologic benefit from the standpoint of hepatic volume reduction preoperatively. I have reiterated the importance of a low carbohydrate and high protein regimen to achieve their stated goal.The patients weight loss goal pre operatively is 20-25 pounds. Hypertension - The patient has a clear understanding of how weight loss improves hypertension as a whole, but also they understand that there is a significant genetic component to this disease process.  We will monitor the patients blood pressure while in the hospital and the plan would be to continue those medications postoperatively.  If a diuretic is being used we will stop them on discharge to prevent dehydration particularly with the sleeve gastrectomy and the gastric bypass procedures.  They will be instructed to monitor their blood pressure postoperatively while at home and notify their primary care physician in the event of any significantly high or uncharacteristic readings.     Obstructive Sleep Apnea -The patient understands the association of sleep apnea and obesity and the additional risk that it caries related to post surgical complications. If they have not been tested for sleep apnea and I feel they are at increased risk for this diagnosis, then they will be scheduled for a consultation with a Pulmonologist for such. In the event that they vannessa this diagnosis we will have the patient bring their CPAP machine to the hospital for use both postoperatively in the PACU and on the floor at its appropriate setting.  We will have them continue using it while at home after surgery and follow up with their pulmonologist 6 months after to be retested to see if it can be discontinued at that time period.        Weight Related Arthritis -The patient understands the benefits that weight loss surgery can have on their arthritis but also understands that weight loss is not a guaranteed cure and relief of symptoms is often dependent on the severity of the underlying disease. The patient also understands that traditional pharmaceutical treatments for this diagnosis are usually unavailable to post-operative weight loss patients due to the effects on the gastrointestinal tract. Any changes to the patients medication treatment will ultimately be made the patients PCP with input by our office.     Restrictive Airway Disease - We will continue all of their pulmonary medications in the form of oral pills and inhalers in both the perioperative and postoperative period.  They understand that their symptoms should improve with weight loss. Any further testing related to this will be turned over to their family physician or pulmonologist. The patient understands that if they require oral or IV steroids in the future that they will notify us. This is particularly important for gastric bypass patients at all times and both sleeve gastrectomy and gastric bypass patients in the 1 month pre op and 1 month post operative period. They understand that inhaled steroids are exempt from this.     Pseudotumor cerebri - The pt has a known diagnosis of PTC & is under the care of her neurologist.  The patient understands that this is a classic co-morbidity of obesity and should improve with weight loss. All home rx related to the tx of PTC will be resumed 2-4 weeks post-op. All changes in medication therapy will be made by the patient's neurologist     Ms. Janice Snellen has a reminder for a \"due or due soon\" health maintenance. I have asked that she contact her primary care provider for follow-up on this health maintenance.       Signed By: Kathie Adam NP     November 29, 2021

## 2021-11-30 LAB — UREA BREATH TEST QL: NEGATIVE

## 2021-12-16 ENCOUNTER — CLINICAL SUPPORT (OUTPATIENT)
Dept: SURGERY | Age: 46
End: 2021-12-16

## 2021-12-16 VITALS — WEIGHT: 293 LBS | HEIGHT: 61 IN | BODY MASS INDEX: 55.32 KG/M2

## 2021-12-16 DIAGNOSIS — E66.01 MORBID OBESITY (HCC): Primary | ICD-10-CM

## 2021-12-16 NOTE — PROGRESS NOTES
Patient's Name: Zonia Cotter   Age: 40 y.o. YOB: 1975   Sex: female    Date:   12/17/2021    Visit 6, visit was in person, with pts caregiver in attendance to ensure that the pt understood instruction 2' Bipolar disorder Dx. Visit Vitals  Ht 5' 1\" (1.549 m)   Wt (!) 168 kg (370 lb 4.8 oz)   BMI 69.97 kg/m²       Pounds Lost since last month: 0.0 lbs               Pounds Gained since last month (@ provider visit with Carol Milder on 11/29/21) : 3.8 lbs    Starting Weight: 361.5 lbs   Previous Months Weight  (@ provider visit with Carol Milder on 11/29/21): 366.5 lbs  Overall Pounds Lost: 0.0 lbs Overall Pounds Gained: 8.8 lbs      Do you smoke? no    Alcohol intake:  Number of drinks at a time:  no  Number of times a week: NONE      Eating Habits and Behaviors    I reviewed Nutrition, Behavior, and Exercise changes to start working on. Some of the eating behaviors that we discussed included:  Macronutrients and their effect on weigh control, the importance of adequate protein and fluids, timing of meals, and the importance of adequate vigorous activity to assist with weight loss. We also talked about avoiding liquid calories. Patient is encouraged to aim for 64 ounces of sugar-free, caffeine-free, and carbonation-free fluid per day , preferably from water, but also sugar free-beverages such as Crystal Light. We also talked about eating out options that are healthier. Patient was encouraged to always request sauces and dressings on the side and request a salad, broth-based soup, or vegetables in place of fries. Patient's current diet habits include: drinking protein shakes, eating small portions( 1/2 bagel & some fruit), Pt states that she has \"no appetite\", and has stopped eating junk food.   9:00 am - UP  9:30-10:00 am-BREAKFAST: bowl of cereal (Special K berry with unsweetened almond milk) OR Oatmeal (flavored with dates and raisins) OR 1/2 bagel (with a \"smear\" of low-fatcream cheese) & fruit (jessica or kiwi)  N/A-LUNCH: Does eat, \"Every now and then I might get a chicken salad with guaolcamole sandwich\"- from [facility], pt states that she eats lunch \"maybe 1x/mo\". Fruit cup (peaches /Mixed fruit/grapefruit)  6:00 -pm-DINNER:  pork steak and boiled neck bones with broccoli and corn on the cob. DESSERT: NONE  SNACKS:  Fruit cup (peaches /Mixed fruit/grapefruit)  FLUIDS: 48 oz water, tea, crystal light, ginger ale    Protein supplement intake: VegaOne mixed in Unsweetened [place] milk, x 1-2/day      Physical Activity/Exercise    Comments: We talked about exercise. Patient was given reasons of why exercise is so important and how that can help with their long-term success. Discussed goal of 150 min of vigorous activity/wk and to include purposeful activity, such as parking far in the grocery store lot, etc. I have encouraged patient to get a support system to help with the activity. Currently for activity, patient is: \"can't with bending legs\", .      Behavior Modification       Comments: We also talked about behavior modifications. We talked about eating triggers, such as eating in front of the TV and solutions, such as making the TV a no eating zone. If patient is eating out of emotion, food will only temporarily solve that. Patient is encouraged to HALT and assess if they are eating because they are Hungry, or out of emotions: Anxious, Lonely, Tired, which the food will only temporarily solve. We also talked about ways to prevent relapse. Goals that patient wants to work on include:  1. Pt will consume a high protein meal or snack every 3-4 hrs while awake, with first meal being consumed in the first 2 hrs after she rises from bed in the morning. 2. Pt will work to eliminate the carbohydrates in her diet from breads, cereals, grains, foods made from grains, pasta, rice, starchy vegetables (all potatoes, peas, corn, lima beans and winter squash), and fruit.   3. Pt will weigh &/or measure all her foods and maintain an electronic food diary that she will bring to all nutrition counseling. 4. Pt will work to find exercise videos or YouTube videos to get cardiovascular exercise in at least 10 min. increments each day with a goal of 150 min cardiovascular activity per week. 5.  Pt will plan her meals ahead of time and shop using a list.  6.  Pt will find at least 2 protein supplements that she can use for meal replacement or protein snack during day, as needed. Pt will use handout on supplements previously provided to select her protein supplement. 7. Pt will limit protein to high protein, low sodium, low fat options and will cook using fat-free cooking methods. Handouts Provided:     [] Bariatric Surgery Criteria Packet  [x] \"Nut for Nuts? Be careful! \"  [] Meal Guide Handout  [] Carb Counting  [x] Protein content of Foods  [] Snack Suggestions  [] BD Starches Packet  [x] Protein Chart  [] Post-op Education Packet  [] Overview of Vitamins & Minerals TIMELINE  [] Other  [] None    Follow-up: Pt scheduled for nutrition education on 1/20/22. Pt was given RD contact information for nutrition-related questions. All current questions answered.     Kristal Brennan MS RD/LD  12/17/2021

## 2022-01-20 ENCOUNTER — CLINICAL SUPPORT (OUTPATIENT)
Dept: SURGERY | Age: 47
End: 2022-01-20

## 2022-01-20 VITALS — BODY MASS INDEX: 55.32 KG/M2 | WEIGHT: 293 LBS | HEIGHT: 61 IN

## 2022-01-20 DIAGNOSIS — E66.01 MORBID OBESITY (HCC): Primary | ICD-10-CM

## 2022-01-20 NOTE — PROGRESS NOTES
Patient's Name: Amol Hernandez   Age: 40 y.o. YOB: 1975   Sex: female    Date:  1/20/2022      Visit 7    Visit Vitals  Ht 5' 1\" (1.549 m)   Wt 157.1 kg (346 lb 6.4 oz)   BMI 65.45 kg/m²       Pounds Lost since last month: 23.9 lbs. Pounds Gained since last month: 0 lbs. Starting Weight: 361.2 lbs. Previous Months Weight: 370.3 lbs. Overall Pounds Lost: 14.8 lbs. Overall Pounds Gained: 0 lbs. Do you smoke? NO    Alcohol intake? NO  Number of drinks at a time:  0  Number of times a week: 0      Eating Habits and Behaviors    I reviewed Nutrition, Behavior, and Exercise changes to continue working on. Some of the eating behaviors that we discussed included:  Meal timing, the importance of adequate intake, amount of protein absorbed at meals, the importance of engaging in adequate amounts of physical activity, and types of physical activity patient can safely do. Patient is encouraged to ensure she is consuming at least 64 ounces of sugar-free, caffeine-free, and carbonation-free fluid per day, preferably from water, but also sugar-free beverages such as Crystal Light. Additionally, we discussed healthier options for dining out. Patient was encouraged to always request sauces and dressings on the side and request a salad, broth-based soups, or non-starchy vegetables in place of fries.     Patient's current diet habits include: States, \"my appetite has really decreased since I've been getting this weight loss shot\" (GLP-1 agonist, unsure of name), cut out sugar and starch, stopped snacking in room at night (chips, cookies)  8-9 AM - UP - coffee w/ SF creamer  9-9:30 AM - BREAKFAST: 0.5 HB egg and turkey sausage links 1-2 OR yogurt (Light n' Fit or Activia), protein shake (Premier)  1-1:30 pm - LUNCH: yogurt (Light n' Fit or Activia) OR 1/2 Atkins bar OR 1/2 lg pickle  5:30-6 pm - DINNER: broccoli/cauliflower, protein (baked fish or boiled shrimp) OR baked chicken, no skin OR Premier w/ steamed vegetables (broccoli)  DESSERT: SF jell-O or SF pudding  SNACKS: yogurt  FLUIDS: water 50-64+ oz/d, sparkling water or Crystal light/other SF additives in water    Protein supplement intake: Premier 2x/d    Physical Activity/Exercise    Comments: We talked about exercise. Patient was given reasons of why exercise is so important and how that can help with their long-term success. Discussed goal of 150 minutes of vigorous activity/wk and to include purposeful activity, such as parking far in the grocery store lot, etc. I have encouraged patient to get a support system to help with the activity. Currently for activity, patient is not doing any purposeful or planned activity. She states, \"My knees haven't been hurting as much as they were when I was real heavy, but I get scared that they might give out on me. So I haven't started. \"        Behavior Modification       Comments: We also talked about behavior modifications. We talked about eating triggers, such as eating in front of the TV and solutions, such as making the TV a no eating zone. If patient is eating out of emotion, food will only temporarily solve that. Patient is encouraged to HALT and assess if they are eating because they are Hungry, or out of emotions: Anxious, Lonely, Tired, which the food will only temporarily solve. We also talked about ways to prevent relapse. Goals that patient wants to work on include:  1. Pt will consume a high protein meal or snack every 3-4 hrs while awake, with first meal being consumed in the first 2 hrs after she rises from bed in the morning. Pt states she has met this goal.  2. Pt will work to eliminate the carbohydrates in her diet from breads, cereals, grains, foods made from grains, pasta, rice, starchy vegetables (all potatoes, peas, corn, lima beans and winter squash), and fruit.  Pt states she has met this goal.  3. Pt will weigh &/or measure all her foods and maintain an electronic food diary that she will bring to all nutrition counseling. Pt states she has met this goal.  She states her foods are prepared by a caregiver, and she does not have access to kitchen in her current living situation. States she has portion plates that she can use. 4.  Pt will work to find exercise videos or YouTube videos to get cardiovascular exercise in at least 10 min. increments each day with a goal of 150 min cardiovascular activity per week. Pt has not started exercising. Continue this goal.  5.  Pt will plan her meals ahead of time and shop using a list.  Pt states she plans her shopping list and is ordering her groceries for delivery. Pt states she has met this goal.  6.  Pt will find at least 2 protein supplements that she can use for meal replacement or protein snack during day, as needed. Pt will use handout on supplements previously provided to select her protein supplement. Pt making progress on this goal. She will find a second protein supplement prior to next appointment. 7. Pt will limit protein choices  to high-protein, low-sodium, low-fat options and will cook using fat-free cooking methods. Pt states she has met this goal.    Goals that patient wants to work on include:  1. Pt will use portion plate at meals to ensure adequate portions of protein and non-starchy vegetables consumed, and she will continue to eliminate carbohydrates from her diet pre- and post-operatively. 2. Patient will find exercise videos on YouTube to follow to engage in cardiovascular exercise at least 10 min/d by next appointment, with goal of 150 min cardiovascular activity per week. 3. Pt will find a second protein supplement from approved list that she will drink post-operatively and use as a meal replacement or high protein snack up to 2x/d to facilitate pre-op weight loss. Handouts Provided:     [] Bariatric Surgery Criteria Packet  [] \"Nut for Nuts? Be careful! \"  [] Meal Guide Handout  [] Carb Counting  [] Protein content of Foods  [] Snack Suggestions  [] BD Starches Packet  [] Protein Chart  [] Post-op Education Packet  [] Overview of Vitamins & Minerals TIMELINE  [x] Other \"Getting Ready for Surgery\"  [] None    Follow-up: Pt scheduled for nutrition education on 2/24/22. Pt was given RD contact information for nutrition-related questions. All current questions answered.     Carine Thomas, [employer]  1/20/2022

## 2022-02-02 ENCOUNTER — HOSPITAL ENCOUNTER (OUTPATIENT)
Dept: LAB | Age: 47
Discharge: HOME | End: 2022-02-02
Payer: MEDICARE

## 2022-02-02 ENCOUNTER — TELEPHONE (OUTPATIENT)
Dept: SURGERY | Age: 47
End: 2022-02-02

## 2022-02-02 ENCOUNTER — OFFICE VISIT (OUTPATIENT)
Dept: SURGERY | Age: 47
End: 2022-02-02
Payer: MEDICARE

## 2022-02-02 VITALS
WEIGHT: 293 LBS | SYSTOLIC BLOOD PRESSURE: 134 MMHG | HEART RATE: 85 BPM | DIASTOLIC BLOOD PRESSURE: 68 MMHG | HEIGHT: 61 IN | TEMPERATURE: 97.7 F | BODY MASS INDEX: 55.32 KG/M2 | OXYGEN SATURATION: 98 %

## 2022-02-02 DIAGNOSIS — I10 PRIMARY HYPERTENSION: ICD-10-CM

## 2022-02-02 DIAGNOSIS — D86.9 SARCOIDOSIS: ICD-10-CM

## 2022-02-02 DIAGNOSIS — K21.9 GASTROESOPHAGEAL REFLUX DISEASE, UNSPECIFIED WHETHER ESOPHAGITIS PRESENT: ICD-10-CM

## 2022-02-02 DIAGNOSIS — E66.01 MORBID OBESITY (HCC): Primary | ICD-10-CM

## 2022-02-02 DIAGNOSIS — G47.33 OBSTRUCTIVE SLEEP APNEA: ICD-10-CM

## 2022-02-02 DIAGNOSIS — M19.90 ARTHRITIS: ICD-10-CM

## 2022-02-02 DIAGNOSIS — E66.01 MORBID OBESITY (HCC): ICD-10-CM

## 2022-02-02 LAB
ALBUMIN SERPL-MCNC: 4 G/DL (ref 3.4–5)
ALBUMIN/GLOB SERPL: 1.2 {RATIO} (ref 0.8–1.7)
ALP SERPL-CCNC: 65 U/L (ref 45–117)
ALT SERPL-CCNC: 21 U/L (ref 13–56)
ANION GAP SERPL CALC-SCNC: 7 MMOL/L (ref 3–18)
AST SERPL-CCNC: 18 U/L (ref 10–38)
BILIRUB SERPL-MCNC: 0.6 MG/DL (ref 0.2–1)
BUN SERPL-MCNC: 17 MG/DL (ref 7–18)
BUN/CREAT SERPL: 9 (ref 12–20)
CALCIUM SERPL-MCNC: 9.7 MG/DL (ref 8.5–10.1)
CHLORIDE SERPL-SCNC: 110 MMOL/L (ref 100–111)
CO2 SERPL-SCNC: 24 MMOL/L (ref 21–32)
CREAT SERPL-MCNC: 1.82 MG/DL (ref 0.6–1.3)
FERRITIN SERPL-MCNC: 135 NG/ML (ref 8–388)
GLOBULIN SER CALC-MCNC: 3.3 G/DL (ref 2–4)
GLUCOSE SERPL-MCNC: 84 MG/DL (ref 74–99)
IRON SERPL-MCNC: 94 UG/DL (ref 50–175)
POTASSIUM SERPL-SCNC: 3.4 MMOL/L (ref 3.5–5.5)
PROT SERPL-MCNC: 7.3 G/DL (ref 6.4–8.2)
SODIUM SERPL-SCNC: 141 MMOL/L (ref 136–145)

## 2022-02-02 PROCEDURE — G8427 DOCREV CUR MEDS BY ELIG CLIN: HCPCS | Performed by: SPECIALIST

## 2022-02-02 PROCEDURE — 36415 COLL VENOUS BLD VENIPUNCTURE: CPT

## 2022-02-02 PROCEDURE — 83540 ASSAY OF IRON: CPT

## 2022-02-02 PROCEDURE — G9717 DOC PT DX DEP/BP F/U NT REQ: HCPCS | Performed by: SPECIALIST

## 2022-02-02 PROCEDURE — G8417 CALC BMI ABV UP PARAM F/U: HCPCS | Performed by: SPECIALIST

## 2022-02-02 PROCEDURE — G8752 SYS BP LESS 140: HCPCS | Performed by: SPECIALIST

## 2022-02-02 PROCEDURE — 80053 COMPREHEN METABOLIC PANEL: CPT

## 2022-02-02 PROCEDURE — 99214 OFFICE O/P EST MOD 30 MIN: CPT | Performed by: SPECIALIST

## 2022-02-02 PROCEDURE — 82728 ASSAY OF FERRITIN: CPT

## 2022-02-02 PROCEDURE — G8754 DIAS BP LESS 90: HCPCS | Performed by: SPECIALIST

## 2022-02-02 RX ORDER — TOLTERODINE 4 MG/1
4 CAPSULE, EXTENDED RELEASE ORAL DAILY
COMMUNITY
Start: 2021-11-10 | End: 2022-05-09

## 2022-02-02 RX ORDER — AMLODIPINE BESYLATE 10 MG/1
10 TABLET ORAL DAILY
COMMUNITY
Start: 2022-01-05 | End: 2022-07-04

## 2022-02-02 RX ORDER — ATORVASTATIN CALCIUM 40 MG/1
40 TABLET, FILM COATED ORAL DAILY
COMMUNITY
Start: 2022-01-06

## 2022-02-02 RX ORDER — DICYCLOMINE HYDROCHLORIDE 10 MG/1
10 CAPSULE ORAL
COMMUNITY
Start: 2022-01-31 | End: 2022-04-01

## 2022-02-02 RX ORDER — OMEPRAZOLE 20 MG/1
CAPSULE, DELAYED RELEASE ORAL
COMMUNITY
Start: 2022-01-31

## 2022-02-02 RX ORDER — CYCLOSPORINE 0.5 MG/ML
EMULSION OPHTHALMIC
COMMUNITY
Start: 2022-01-12

## 2022-02-02 RX ORDER — HYDROXYZINE PAMOATE 50 MG/1
100 CAPSULE ORAL 4 TIMES DAILY
COMMUNITY

## 2022-02-02 RX ORDER — ZIPRASIDONE HYDROCHLORIDE 40 MG/1
CAPSULE ORAL
COMMUNITY
Start: 2022-01-23 | End: 2022-04-20 | Stop reason: ALTCHOICE

## 2022-02-02 RX ORDER — PREGABALIN 100 MG/1
200 CAPSULE ORAL 2 TIMES DAILY
COMMUNITY
End: 2022-04-20 | Stop reason: ALTCHOICE

## 2022-02-02 RX ORDER — BUPROPION HYDROCHLORIDE 300 MG/1
300 TABLET ORAL 2 TIMES DAILY
COMMUNITY
Start: 2022-01-27

## 2022-02-02 RX ORDER — ONDANSETRON 4 MG/1
TABLET, FILM COATED ORAL
COMMUNITY
Start: 2022-01-06

## 2022-02-02 RX ORDER — BUSPIRONE HYDROCHLORIDE 10 MG/1
30 TABLET ORAL 2 TIMES DAILY
COMMUNITY

## 2022-02-02 RX ORDER — LATANOPROST 50 UG/ML
SOLUTION/ DROPS OPHTHALMIC
COMMUNITY
Start: 2022-01-10

## 2022-02-02 NOTE — PATIENT INSTRUCTIONS
Patient Instructions      1. Remember hydration goals - minimum of 64 ounces of liquids per day (dehydration is the number one reason for hospital readmission). 2. Continue to monitor carbohydrate and protein intake you need a minimum of  Grams of protein daily- remember to keep your total carbohydrates to 50 grams or less per day for best results. 3. Continue to work towards exercise goals - 60-90 minutes, 5 times a week minimum of deliberate, aerobic exercise is the ultimate goal with strength training 2 times each week. Refer to [facility] for  information. 4. Remember to take vitamins as directed. 5. Attend support group the 2nd Thursday of each month. 6.  If you have had labs drawn. We will only call you if you have abnormal results. Otherwise you can access the lab results in \"[employer]\". You will only need the access code the first time you sign on.    7.  Call us at [phone] or email us through [email or web address]" with questions,     concerns or worsening of condition, we have someone on call 24 hours a day. If you are unable to reach our office, you are to go to your Primary Care Physician or the Emergency Department. NOTE TO GASTRIC BYPASS PATIENTS:  (SAME APPLIES TO GASTRIC SLEEVE PATIENTS FOR FIRST TWO MONTHS)  Remember that for the rest of your life, you are not able to take the following:  - NSAIDs (ibuprofen, goody powder, BC powder, Motrin, Advil, Mobic, Voltaren, Excedrin, etc.)  - Steroid pills or injections  - Smoke (cigarettes or recreational drugs)  - Alcohol  Use of any of the above may cause ulcers in your stomach which may perforate causing a medical emergency and surgery. Speak to our medical staff if another medical provider requires you to take steroids or NSAIDs.           Supplement Resource Guide    Importance of Protein:   Maintains lean body mass, produces antibodies to fight off infections, heals wounds, minimizes hair loss, helps to give you energy, helps with satiety, and keeping you full between meals. Importance of Calcium:  Needed for healthy bones and teeth, normal blood clotting, and nervous system functioning, higher risk of osteoporosis and bone disease with non-compliance. Importance of Multivitamins: Many functions. Supply you with extra nutrients that you may be missing from food. May lead to iron deficiency anemia, weakness, fatigue, and many other symptoms with non-compliance. Importance of B Vitamins:  Important for red blood cell formation, metabolism, energy, and helps to maintain a healthy nervous system. Protein Supplement  Find one you like now. Use immediately after surgery. Look for:  35-50g protein each day from your protein supplement once you reach the progression diet. 0-3 g fat per serving  0-3 g sugar per serving    Protein drinks should be split in separate dosages. Recommend: Lifelong  1 year + Calcium Supplement:     Start taking within a month after surgery. Look for: Calcium Citrate Plus D (1500 mg per day)  Recommend: Citracal     .            Avoid chocolate chewable calcium. Can use chewable bariatric or [employer] brand or similar chewable. The body cannot absorb more than 500-600 mg of calcium at a time. Take for Life Multi-vitamin Supplement:      Start immediately after surgery: any complete chewable, such as: [place]s Complete chewables. Avoid [place] sours or gummies. They lack iron and other important nutrients and also have added sugar. Continue with chewable vitamin or change to adult complete multivitamin one month after surgery. Menstruating women can take a prenatal vitamin. Make sure has at least 18 mg iron and 516-575 mcg folic acid   Vitamin W15, B Complex Vitamin, and Biotin  Start taking within a month after surgery.    Vitamin B12:  1000 mcg of Vitamin B12 three times weekly    Must take sublingually (meaning you take it under your tongue) or in a liquid drop form for easy absorption. B Complex Vitamin: Take a pill or liquid drop form once daily. Biotin: This vitamin can help prevent hair loss.     Recommend 5mg   (5000 mcg) a day  Biotin is Optional

## 2022-02-02 NOTE — PROGRESS NOTES
Bariatric Surgery Consultation    Subjective:     Jose Georges is a 40 y.o. obese female with a Body mass index is 64.64 kg/m². Genie Cobian desires surgery at this time because of health issues and quality of life issues. Jose Georges has been seen by a bariatric nutritionist and has been placed on an appropriate low carbohydrate diet. The patient desires laparoscopic sleeve gastrectomy for surgical weight loss, however she is not currently a surgical candidate due to pending work up. Jose Georges is here today to check progress with weight loss / evaluate nutritional status and review all subspecialty clearances in hopes of proceeding to the operating room. Office visit notes from February 2021 to present have been reviewed. Jose Georges has increased fluid intake, is focusing on protein, is eating regularly, is taking a multivitamin & has increased her activity. No recent visits with PCP. No new medications. Off prednisone since mid September     No smoking since March 2021     Has a 20-25 lb weight loss goal. Consult weight 361 lbs. She has lost 25 lbs since her last visit November 29th , 2021 with DENVER Wilburn.     UGI done 3/17/21 showed normal anatomy.      Cardiac clearance received and scanned under media tab following abnormal stress test in July.      Patient Active Problem List    Diagnosis Date Noted    Cardiomyopathy [facility])     Chronic pain     Sarcoidosis     Obstructive sleep apnea     Morbid obesity with BMI of 60.0-69.9, adult ([facility] [address]     Smoker     Irregular menses     Fibromyalgia     Carpal tunnel syndrome     TOM (stress urinary incontinence, female)     Migraines     Uses powered wheelchair     Chronic back pain     DJD (degenerative joint disease) of knee     Chronic heart failure with preserved ejection fraction (HCC)     COPD (chronic obstructive pulmonary disease) ([facility] [address] 02/10/2017    Morbid obesity ([facility] [address]     Asthma     GEORGE treated with BiPAP  GERD (gastroesophageal reflux disease)     Arthritis     Pulmonary sarcoidosis (HCC)     Hypertension     Bipolar disorder (HCC)       Past Surgical History:   Procedure Laterality Date    HX HERNIA REPAIR      [facility] repair age 16   Susan Katja TONSILLECTOMY        Social History     Tobacco Use    Smoking status: Former Smoker     Packs/day: 0.25     Years: 23.00     Pack years: 5.75     Types: Cigarettes     Quit date: 2021     Years since quittin.6    Smokeless tobacco: Never Used   Substance Use Topics    Alcohol use: No      Family History   Problem Relation Age of Onset    Hypertension Mother     Psychiatric Disorder Mother     Cancer Father       Current Outpatient Medications   Medication Sig Dispense Refill    amLODIPine (NORVASC) 10 mg tablet Take 10 mg by mouth daily.  Restasis 0.05 % dpet       dicyclomine (BENTYL) 10 mg capsule Take 10 mg by mouth.  latanoprost (XALATAN) 0.005 % ophthalmic solution INSTILL 1 DROP INTO BOTH EYES AT BEDTIME      linaCLOtide (Linzess) 72 mcg cap capsule Take 72 mcg by mouth.  omeprazole (PRILOSEC) 20 mg capsule       ondansetron hcl (ZOFRAN) 4 mg tablet TAKE 1 TABLET BY MOUTH EVERY 8 HOURS AS NEEDED FOR NAUSEA AND VOMITING      pregabalin (LYRICA) 100 mg capsule Take 200 mg by mouth two (2) times a day.  tolterodine ER (DETROL LA) 4 mg ER capsule Take 4 mg by mouth daily.  ziprasidone (GEODON) 40 mg capsule       atorvastatin (LIPITOR) 40 mg tablet Take 40 mg by mouth daily.  buPROPion XL (WELLBUTRIN XL) 300 mg XL tablet Take 300 mg by mouth two (2) times a day.  busPIRone (BUSPAR) 10 mg tablet Take 30 mg by mouth two (2) times a day.  hydrOXYzine pamoate (VISTARIL) 50 mg capsule Take 100 mg by mouth four (4) times daily.  liraglutide (VICTOZA) 0.6 mg/0.1 mL (18 mg/3 mL) pnij 0.6 mg by SubCUTAneous route.       traZODone (DESYREL) 50 mg tablet       buPROPion SR (Wellbutrin SR) 150 mg SR tablet Take  by mouth two (2) times a day.  budesonide-formoteroL (SYMBICORT) 80-4.5 mcg/actuation HFAA Take 2 Puffs by inhalation two (2) times a day.  hydrOXYzine pamoate (VISTARIL) 100 mg capsule TAKE 1 CAPSULE BY MOUTH 4 TIMES DAILY AS NEEDED FOR ANXIETY      metoprolol tartrate (LOPRESSOR) 25 mg tablet Take 25 mg by mouth daily.  prazosin (MINIPRESS) 5 mg capsule TAKE ONE AT NIGHT FOR NIGHTMARES      fish oil-omega-3 fatty acids (Fish Oil) 340-1,000 mg capsule Take 500 mg by mouth daily.  cetirizine (ZYRTEC) 10 mg tablet Take 10 mg by mouth daily. 2    cyclobenzaprine (FLEXERIL) 10 mg tablet Take 1 Tab by mouth two (2) times daily as needed for Muscle Spasm(s). Prescribed and managed by Dr. Thea Stubbs, [address] [place] [address]. Indications: Disorder characterized by Stiff, Tender & Painful Muscles      spironolactone (ALDACTONE) 25 mg tablet Take 1 Tab by mouth daily. Prescribed and managed by Dr. Kellen Paulino, [address] [place] [address] Cardiology.  furosemide (LASIX) 80 mg tablet Take 1 Tab by mouth two (2) times a day. Prescribed and managed by Dr. Kellen Paulino, [facility] Cardiology  3    busPIRone (BUSPAR) 10 mg tablet Take 1 Tab by mouth two (2) times a day. Prescribed and managed by Ignacio Sheehan, [facility]  0    levothyroxine (SYNTHROID) 25 mcg tablet Take 1 Tab by mouth Daily (before breakfast). Prescribed and managed by Dr. Walter Sheriff. 0    montelukast (SINGULAIR) 10 mg tablet TAKE 1 TABLET BY MOUTH AT BEDTIME  3    fluticasone (FLONASE) 50 mcg/actuation nasal spray 2 Sprays by Both Nostrils route daily.  albuterol (PROVENTIL HFA, VENTOLIN HFA, PROAIR HFA) 90 mcg/actuation inhaler Take 2 Puffs by inhalation every four (4) hours as needed for Wheezing. 1 Inhaler 1    atorvastatin (LIPITOR) 20 mg tablet Take 40 mg by mouth daily.        Allergies   Allergen Reactions    Morphine Itching and Swelling    Trazodone Other (comments)     Patient overdosed on medication in the past  Vicodin [Hydrocodone-Acetaminophen] Itching          Review of Systems:        General - No history or complaints of unexpected fever, chills, or weight loss  Head/Neck - No history or complaints of headache, diplopia, dysphagia, hearing loss  Cardiac - No history or complaints of chest pain, palpitations, murmur, or shortness of breath  Pulmonary - No history or complaints of shortness of breath, productive cough, hemoptysis  Gastrointestinal - has reflux uses H2 blocker prn reflux,  abdominal pain, obstipation/constipation, blood per rectum  Genitourinary - No history or complaints of hematuria/dysuria, stress urinary incontinence symptoms, or renal lithiasis  Musculoskeletal - has joint pain in her knees, has muscular weakness, uses wheelchair  Hematologic - No history or complaints of bleeding disorders, blood transfusions, sickle cell anemia  Neurologic - No history or complaints of  migraine headaches, seizure activity, syncopal episodes, TIA or stroke  Integumentary - No history or complaints of rashes, abnormal nevi, skin cancer  Gynecological - No history of heavy menses/abnormal menses    Objective:     Visit Vitals  /68 (BP 1 Location: Left upper arm, BP Patient Position: Sitting, BP Cuff Size: Large adult)   Pulse 85   Temp 97.7 °F (36.5 °C)   Ht 5' 1\" (1.549 m)   Wt 155.2 kg (342 lb 1.6 oz)   SpO2 98%   BMI 64.64 kg/m²       Physical Exam:    General appearance:  alert, cooperative, no distress, appears stated age   Mental status   alert, oriented to person, place, and time, normal mood, behavior, speech, dress, motor activity, and thought processes   Neck  supple, no significant adenopathy     Lymphatics  no palpable lymphadenopathy, no hepatosplenomegaly   Chest  clear to auscultation, no wheezes, rales or rhonchi, symmetric air entry   Heart  normal rate, regular rhythm, normal S1, S2, no murmurs, rubs, clicks or gallops    Abdomen: soft, nontender, nondistended, no masses or organomegaly, massive central obesity   Neurological  alert, oriented, normal speech, no focal findings or movement disorder noted   Musculoskeletal no joint tenderness, deformity or swelling   Extremities peripheral pulses normal, no pedal edema, no clubbing or cyanosis   Skin normal coloration and turgor, no rashes, no suspicious skin lesions noted        Labs:     Recent Results (from the past 2016 hour(s))   H. PYLORI BREATH TEST    Collection Time: 11/29/21 11:37 AM   Result Value Ref Range    H. pylori Breath Test Negative Negative         6/4/21 CT ABD Pelvis with contrast   REASON FOR EXAM:   generalized abdominal pain, constipation     COMPARISON:   05/13/2021     TECHNIQUE:   5 mm axial CT images of abdomen and pelvis are obtained following the uneventful administration of intravenous contrast.     All CT scans performed at [facility] facilities are performed using dose optimization techniques as appropriate to exam including the following: Automated exposure control, adjustment of the mA or kVp according to patient size, and the use of iterative reconstruction techniques. REPORT:   There is no evidence of pleural effusion or abnormal pericardial effusion.  The heart is not enlarged.  There is no lung mass, nodule, or pulmonary infiltrates. Liver, spleen, pancreas, adrenal glands, and kidneys are otherwise unremarkable.  Abdominal aorta demonstrates no focal dilatation.  There is no retroperitoneal or mesenteric lymphadenopathy.  There is no ascites or abdominal free air.  Gallbladder is distended without abnormality. Visualized upper GI tract is unremarkable.  Small bowel appears normal.  Appendix appears normal.  Colon is unremarkable.  Bladder is distended without abnormality.  There is no pelvic mass or pelvic free fluid.      Bones are unremarkable.  Subcutaneous tissues and musculature appear normal.     IMPRESSION:   There is no evidence of acute abdominopelvic disease.      Reviewed labs in [place] from 8/19/21:  Hgb 11.9  HCT 34.4  K 4.0  Crt 1.34    2/27/21:  TSH 2.79  HgA1c 5.4%    Assessment:     Morbid obesity with associated comorbidity of hypertension, sleep apnea, severe central obesity & outstanding insurance requirements. Plan: To continue current medications & routine follow-up with PCP. Continuation of Pre-Operative evaluation / clearance:  Monserrat Dwyer has returned to the office today to discuss her status as a surgical candidate. Progress has been noted and reviewed -   including review of notes from dietician. Monserrat Dwyer is being compliant with follow-up & recommendations. Monserrat Dwyer has 1-6 more pounds to lose before proceeding to the OR.  (24 pounds lost since last visit)  Monserrat Dwyer has a more nutritional visits to complete before proceeding to the OR. Additionally, 0 more medical visits are required. Monserrat Dwyer has no outstanding  clearance to review before proceeding to the OR. Monserrat Dwyer will return in 1 month to continue the pre-operative process and to work towards goals as outlined. Monserrat Dwyer understand the rationales for all the above and plans to follow the diet & activity recommendations of the dietician. It has been   discussed that given her morbidly obese condition that the best surgical option for this patient would be the laparoscopic sleeve gastrectomy   followed by gastric bypass . Monserrat Dwyer agrees with the surgical choice and has been educated in it's; risks, benefits, and alternatives. We will continue with the pre-operative evaluation as needed to check progress. At this juncture the patient would like to avoid a two-stage operation and instead only have one ultimate procedure.   She has done so well so far with her weight loss that I feel she can lose another 15 to 20 pounds in order to achieve that goal.  She will come back in 6 weeks and we will assess her weight loss and reexamine her to see if the gastric bypass procedure is feasible as a single stage operation. Secondary Diagnoses:     Dietary Intervention  - The patient is currently followed by a bariatric nutritionist for an attempt at preoperative weight loss as has been dictated by their insurance carrier. They will be assessed at various times during their follow up to evaluate their progress depending on the length of time that is required once again by their carrier. I have explained the importance of preoperative weight loss and the benefits regarding lower surgical risk and also assisting the patient in reaching their weight loss goal.  Finally they understand there is a physiologic benefit from the standpoint of hepatic volume reduction preoperatively. I have reiterated the importance of a low carbohydrate and high protein regimen to achieve their stated goal.The patients weight loss goal pre operatively is 20-25 pounds. Hypertension - The patient has a clear understanding of how weight loss improves hypertension as a whole, but also they understand that there is a significant genetic component to this disease process. We will monitor the patients blood pressure while in the hospital and the plan would be to continue those medications postoperatively.  If a diuretic is being used we will stop them on discharge to prevent dehydration particularly with the sleeve gastrectomy and the gastric bypass procedures.  They will be instructed to monitor their blood pressure postoperatively while at home and notify their primary care physician in the event of any significantly high or uncharacteristic readings.     Obstructive Sleep Apnea -The patient understands the association of sleep apnea and obesity and the additional risk that it caries related to post surgical complications.  If they have not been tested for sleep apnea and I feel they are at increased risk for this diagnosis, then they will be scheduled for a consultation with a Pulmonologist for such. In the event that they vannessa this diagnosis we will have the patient bring their CPAP machine to the hospital for use both postoperatively in the PACU and on the floor at its appropriate setting.  We will have them continue using it while at home after surgery and follow up with their pulmonologist 6 months after to be retested to see if it can be discontinued at that time period.        Weight Related Arthritis -The patient understands the benefits that weight loss surgery can have on their arthritis but also understands that weight loss is not a guaranteed cure and relief of symptoms is often dependent on the severity of the underlying disease.  The patient also understands that traditional pharmaceutical treatments for this diagnosis are usually unavailable to post-operative weight loss patients due to the effects on the gastrointestinal tract.  Any changes to the patients medication treatment will ultimately be made the patients PCP with input by our office.     Restrictive Airway Disease - We will continue all of their pulmonary medications in the form of oral pills and inhalers in both the perioperative and postoperative period. They understand that their symptoms should improve with weight loss. Any further testing related to this will be turned over to their family physician or pulmonologist. The patient understands that if they require oral or IV steroids in the future that they will notify us. This is particularly important for gastric bypass patients at all times and both sleeve gastrectomy and gastric bypass patients in the 1 month pre op and 1 month post operative period.  They understand that inhaled steroids are exempt from this.     Pseudotumor cerebri - The pt has a known diagnosis of PTC & is under the care of her neurologist. Haroldo Krueger patient understands that this is a classic co-morbidity of obesity and should improve with weight loss.  All home rx related to the tx of PTC will be resumed 2-4 weeks post-op.  All changes in medication therapy will be made by the patient's neurologist    MsFidencio Criss Palma has a reminder for a \"due or due soon\" health maintenance. I have asked that she contact her primary care provider for follow-up on this health maintenance.       Signed By: Ibis Amador NP     February 2, 2022

## 2022-02-03 NOTE — PROGRESS NOTES
Progress Note  02/02/22    Ms. Corry Hernandez' lab work came back today and her creatinine was 1.82 and by her other creatinine levels in prior years, this is a significant increase. She is on a tremendous number of diuretics and other medications and I have asked her to contact her family physician to have them check on all her medications to see if anything needs to be changed.

## 2022-03-19 PROBLEM — J44.9 COPD (CHRONIC OBSTRUCTIVE PULMONARY DISEASE) (HCC): Status: ACTIVE | Noted: 2017-02-10

## 2022-04-13 ENCOUNTER — CLINICAL SUPPORT (OUTPATIENT)
Dept: SURGERY | Age: 47
End: 2022-04-13

## 2022-04-13 VITALS — WEIGHT: 293 LBS | HEIGHT: 61 IN | BODY MASS INDEX: 55.32 KG/M2

## 2022-04-13 DIAGNOSIS — E66.01 MORBID OBESITY (HCC): Primary | ICD-10-CM

## 2022-04-13 NOTE — PROGRESS NOTES
Patient's Name: Sadie Rodriguez   Age: 40 y.o. YOB: 1975   Sex: female    Date:  4/13/2022      Visit 8    Visit Vitals  Ht 5' 1\" (1.549 m)   Wt 141.9 kg (312 lb 14.4 oz)   BMI 59.12 kg/m²       Pounds Lost since last month: 34.1 lbs. (1/20/22)              Pounds Gained since last month: 0 lbs. Starting Weight: 361.5 lbs. Previous Months Weight: 346.4 lbs. Overall Pounds Lost: 48.6 lbs. Overall Pounds Gained: 0 lbs. Do you smoke? No    Alcohol intake? No  Number of drinks at a time:  0  Number of times a week: 0      Eating Habits and Behaviors    I reviewed Nutrition, Behavior, and Exercise changes to start working on. Some of the eating behaviors that we discussed included:   timing of meals, meal composition, the importance of adequate fluids and protein, the effect of macronutrients on weight loss efforts, label reading to avoid hidden fats and sugar in diet, and selecting appropriate foods for meals and snacks. We also talked about avoiding liquid calories. Patient is encouraged to aim for 64 ounces of sugar-free, caffeine-free, and carbonation-free fluid per day, preferably from water, but also sugar-free beverages such as Crystal Light. Additionally, we discussed healthier options for dining out. Patient was encouraged to always request sauces and dressings on the side and request a salad, broth-based soups, or non-starchy vegetables in place of fries.     Patient's current diet habits include: pt reports lack of appetite d/t appetite suppressant; moved all food out of her room into kitchen so they are not in easy reach  7:30-8:00 AM - UP  9:30-10:00 AM - BREAKFAST: HB egg (1), 9-3.1 slices turkey fonseca OR scrambled eggs OR omelet w/ green peppers, onions  11:30 AM-12:00 PM - LUNCH: yogurt (Dannon Light n' Fit [place] OR Chobani), 1/2 sandwich ([place] bologna - 1 slice [place] Citron wheat [id number] kcal/slice) OR protein shake (Fairlife, Muscle Milk)   5:30-6:00 PM - DINNER: 1/2 Atkins frozen dinner (Beef Merlot, Pizza, Meatloaf) OR broiled shrimp OR protein shake  8:00-9:30 AM - BED    DESSERT: Hot Tamales (2 ea - 1 time) OR SF jell-O OR SF pudding  SNACKS: Pork rinds, veggie straws/chips  FLUIDS: herbal tea (caffeine free) - 1 cup/d; water 51 oz/d; cut out coffee; cranberry juice at breakfast w/ meds (100% juice and Diet); diet ginger ale (2-3x/wk)  Protein supplement intake: 1-2x/d (Faiflife, Muscle Milk)      Physical Activity/Exercise    Comments: We talked about exercise. Patient was given reasons of why exercise is so important and how that can help with their long-term success. Discussed goal of 150 minutes of vigorous activity/wk and to include purposeful activity, such as parking far in the grocery store lot, etc. I have encouraged patient to get a support system to help with the activity. Currently for activity, patient is: PT 2x/wk, ~1 hr to strengthen knees/legs (resistance bands, climbing steps, side-steps, recumbent bike); walking up/down ramp at home 2x/wk, unknown duration (until I get tired)      Behavior Modification       Comments: We also talked about behavior modifications. We talked about eating triggers, such as eating in front of the TV and solutions, such as making the TV a no eating zone. If patient is eating out of emotion, food will only temporarily solve that. Patient is encouraged to HALT and assess if they are eating because they are Hungry, or out of emotions: Anxious, Lonely, Tired, which the food will only temporarily solve. We also talked about ways to prevent relapse. Patient's progress towards goals set last month:  1. Pt will use portion plate at meals to ensure adequate portions of protein and non-starchy vegetables consumed, and she will continue to eliminate carbohydrates from her diet pre- and post-operatively. Pt states she has met this goal  2.  Patient will find exercise videos on YouTube to follow to engage in cardiovascular exercise at least 10 min/d by next appointment, with goal of 150 min cardiovascular activity per week. Pt has met this goal through going to PT and walking at home, will continue to increase intensity and duration as able  3. Pt will find a second protein supplement from approved list that she will drink post-operatively and use as a meal replacement or high protein snack up to 2x/d to facilitate pre-op weight loss. Pt states she has met this goal    Handouts Provided:     [] Bariatric Surgery Criteria Packet  [] \"Nut for Nuts? Be careful! \"  [] Meal Guide Handout  [] Carb Counting  [] Protein content of Foods  [] Snack Suggestions  [] BD Starches Packet  [] Protein Chart  [] Post-op Education Packet  [] Overview of Vitamins & Minerals TIMELINE  [x] Other (Protein Supplement List per request)  [] None    Candidate for surgery (per RD): YES - Pt acknowledges understanding that bariatric surgery requires lifelong adherence to dietary and exercise behavior change recommendations in order to be successful with weight loss and long-term weight maintenance once weight loss goal is met. Pt demonstrates understanding of post-op key diet principles and recommendations through recall and class discussion.      Obed Osullivan, RD  4/13/2022

## 2022-04-20 ENCOUNTER — OFFICE VISIT (OUTPATIENT)
Dept: SURGERY | Age: 47
End: 2022-04-20
Payer: MEDICARE

## 2022-04-20 VITALS
HEART RATE: 86 BPM | DIASTOLIC BLOOD PRESSURE: 79 MMHG | SYSTOLIC BLOOD PRESSURE: 114 MMHG | WEIGHT: 293 LBS | HEIGHT: 61 IN | BODY MASS INDEX: 55.32 KG/M2 | TEMPERATURE: 96.8 F | OXYGEN SATURATION: 100 %

## 2022-04-20 DIAGNOSIS — G47.33 OBSTRUCTIVE SLEEP APNEA: ICD-10-CM

## 2022-04-20 DIAGNOSIS — J45.902 ASTHMA WITH STATUS ASTHMATICUS, UNSPECIFIED ASTHMA SEVERITY, UNSPECIFIED WHETHER PERSISTENT: ICD-10-CM

## 2022-04-20 DIAGNOSIS — K21.9 GASTROESOPHAGEAL REFLUX DISEASE, UNSPECIFIED WHETHER ESOPHAGITIS PRESENT: ICD-10-CM

## 2022-04-20 DIAGNOSIS — G43.909 MIGRAINE WITHOUT STATUS MIGRAINOSUS, NOT INTRACTABLE, UNSPECIFIED MIGRAINE TYPE: ICD-10-CM

## 2022-04-20 DIAGNOSIS — G47.33 OSA TREATED WITH BIPAP: ICD-10-CM

## 2022-04-20 DIAGNOSIS — E66.01 MORBID OBESITY (HCC): Primary | ICD-10-CM

## 2022-04-20 DIAGNOSIS — D86.0 PULMONARY SARCOIDOSIS (HCC): ICD-10-CM

## 2022-04-20 PROCEDURE — G8752 SYS BP LESS 140: HCPCS | Performed by: SPECIALIST

## 2022-04-20 PROCEDURE — G8427 DOCREV CUR MEDS BY ELIG CLIN: HCPCS | Performed by: SPECIALIST

## 2022-04-20 PROCEDURE — 99214 OFFICE O/P EST MOD 30 MIN: CPT | Performed by: SPECIALIST

## 2022-04-20 PROCEDURE — G8754 DIAS BP LESS 90: HCPCS | Performed by: SPECIALIST

## 2022-04-20 PROCEDURE — G9717 DOC PT DX DEP/BP F/U NT REQ: HCPCS | Performed by: SPECIALIST

## 2022-04-20 PROCEDURE — G8417 CALC BMI ABV UP PARAM F/U: HCPCS | Performed by: SPECIALIST

## 2022-04-20 RX ORDER — DIVALPROEX SODIUM 250 MG/1
250 TABLET, EXTENDED RELEASE ORAL 2 TIMES DAILY
COMMUNITY
Start: 2022-04-01

## 2022-04-20 RX ORDER — ACETAZOLAMIDE 500 MG/1
500 CAPSULE, EXTENDED RELEASE ORAL 2 TIMES DAILY
COMMUNITY
Start: 2022-04-03

## 2022-04-20 RX ORDER — PEN NEEDLE, DIABETIC 31 GX5/16"
NEEDLE, DISPOSABLE MISCELLANEOUS
COMMUNITY
Start: 2022-03-02

## 2022-04-20 RX ORDER — DIPHENHYDRAMINE HCL 25 MG
25 CAPSULE ORAL
COMMUNITY
Start: 2021-08-27

## 2022-04-20 RX ORDER — FLUCONAZOLE 150 MG/1
TABLET ORAL
COMMUNITY
Start: 2022-03-23

## 2022-04-20 RX ORDER — PANTOPRAZOLE SODIUM 40 MG/1
40 TABLET, DELAYED RELEASE ORAL DAILY
COMMUNITY
Start: 2022-04-07

## 2022-04-20 NOTE — PATIENT INSTRUCTIONS

## 2022-05-02 ENCOUNTER — HOSPITAL ENCOUNTER (EMERGENCY)
Age: 47
Discharge: HOME | End: 2022-05-02
Attending: EMERGENCY MEDICINE
Payer: MEDICARE

## 2022-05-02 ENCOUNTER — APPOINTMENT (OUTPATIENT)
Dept: GENERAL RADIOLOGY | Age: 47
End: 2022-05-02
Attending: EMERGENCY MEDICINE
Payer: MEDICARE

## 2022-05-02 ENCOUNTER — APPOINTMENT (OUTPATIENT)
Dept: CT IMAGING | Age: 47
End: 2022-05-02
Attending: EMERGENCY MEDICINE
Payer: MEDICARE

## 2022-05-02 VITALS
HEIGHT: 60 IN | DIASTOLIC BLOOD PRESSURE: 74 MMHG | WEIGHT: 293 LBS | SYSTOLIC BLOOD PRESSURE: 91 MMHG | HEART RATE: 66 BPM | OXYGEN SATURATION: 100 % | BODY MASS INDEX: 57.52 KG/M2 | RESPIRATION RATE: 16 BRPM

## 2022-05-02 DIAGNOSIS — R53.82 CHRONIC FATIGUE: Primary | ICD-10-CM

## 2022-05-02 DIAGNOSIS — Z86.79 ATRIAL FIBRILLATION, CURRENTLY IN SINUS RHYTHM: ICD-10-CM

## 2022-05-02 LAB
ALBUMIN SERPL-MCNC: 4 G/DL (ref 3.4–5)
ALBUMIN/GLOB SERPL: 1.1 {RATIO} (ref 0.8–1.7)
ALP SERPL-CCNC: 50 U/L (ref 45–117)
ALT SERPL-CCNC: 19 U/L (ref 13–56)
ANION GAP SERPL CALC-SCNC: 4 MMOL/L (ref 3–18)
AST SERPL-CCNC: 16 U/L (ref 10–38)
ATRIAL RATE: 66 BPM
BASOPHILS # BLD: 0 K/UL (ref 0–0.1)
BASOPHILS NFR BLD: 0 % (ref 0–2)
BILIRUB SERPL-MCNC: 0.6 MG/DL (ref 0.2–1)
BNP SERPL-MCNC: 353 PG/ML (ref 0–450)
BUN SERPL-MCNC: 11 MG/DL (ref 7–18)
BUN/CREAT SERPL: 8 (ref 12–20)
CALCIUM SERPL-MCNC: 9.3 MG/DL (ref 8.5–10.1)
CALCULATED P AXIS, ECG09: 41 DEGREES
CALCULATED R AXIS, ECG10: 0 DEGREES
CALCULATED R AXIS, ECG10: 29 DEGREES
CALCULATED T AXIS, ECG11: -119 DEGREES
CALCULATED T AXIS, ECG11: -17 DEGREES
CHLORIDE SERPL-SCNC: 113 MMOL/L (ref 100–111)
CO2 SERPL-SCNC: 23 MMOL/L (ref 21–32)
CREAT SERPL-MCNC: 1.4 MG/DL (ref 0.6–1.3)
DIAGNOSIS, 93000: NORMAL
DIAGNOSIS, 93000: NORMAL
DIFFERENTIAL METHOD BLD: ABNORMAL
EOSINOPHIL # BLD: 0 K/UL (ref 0–0.4)
EOSINOPHIL NFR BLD: 0 % (ref 0–5)
ERYTHROCYTE [DISTWIDTH] IN BLOOD BY AUTOMATED COUNT: 15.3 % (ref 11.6–14.5)
GLOBULIN SER CALC-MCNC: 3.5 G/DL (ref 2–4)
GLUCOSE BLD STRIP.AUTO-MCNC: 77 MG/DL (ref 70–110)
GLUCOSE SERPL-MCNC: 90 MG/DL (ref 74–99)
HCT VFR BLD AUTO: 41.4 % (ref 35–45)
HGB BLD-MCNC: 13.6 G/DL (ref 12–16)
IMM GRANULOCYTES # BLD AUTO: 0 K/UL (ref 0–0.04)
IMM GRANULOCYTES NFR BLD AUTO: 0 % (ref 0–0.5)
INR PPP: 1.1 (ref 0.8–1.2)
LYMPHOCYTES # BLD: 1.5 K/UL (ref 0.9–3.6)
LYMPHOCYTES NFR BLD: 28 % (ref 21–52)
MAGNESIUM SERPL-MCNC: 2.3 MG/DL (ref 1.6–2.6)
MCH RBC QN AUTO: 30.6 PG (ref 24–34)
MCHC RBC AUTO-ENTMCNC: 32.9 G/DL (ref 31–37)
MCV RBC AUTO: 93 FL (ref 78–100)
MONOCYTES # BLD: 0.6 K/UL (ref 0.05–1.2)
MONOCYTES NFR BLD: 10 % (ref 3–10)
NEUTS SEG # BLD: 3.3 K/UL (ref 1.8–8)
NEUTS SEG NFR BLD: 61 % (ref 40–73)
NRBC # BLD: 0 K/UL (ref 0–0.01)
NRBC BLD-RTO: 0 PER 100 WBC
P-R INTERVAL, ECG05: 180 MS
PLATELET # BLD AUTO: 196 K/UL (ref 135–420)
PMV BLD AUTO: 11.5 FL (ref 9.2–11.8)
POTASSIUM SERPL-SCNC: 4.1 MMOL/L (ref 3.5–5.5)
PROT SERPL-MCNC: 7.5 G/DL (ref 6.4–8.2)
PROTHROMBIN TIME: 14.4 SEC (ref 11.5–15.2)
Q-T INTERVAL, ECG07: 320 MS
Q-T INTERVAL, ECG07: 434 MS
QRS DURATION, ECG06: 72 MS
QRS DURATION, ECG06: 94 MS
QTC CALCULATION (BEZET), ECG08: 441 MS
QTC CALCULATION (BEZET), ECG08: 454 MS
RBC # BLD AUTO: 4.45 M/UL (ref 4.2–5.3)
SODIUM SERPL-SCNC: 140 MMOL/L (ref 136–145)
TROPONIN-HIGH SENSITIVITY: 8 NG/L (ref 0–54)
VENTRICULAR RATE, ECG03: 114 BPM
VENTRICULAR RATE, ECG03: 66 BPM
WBC # BLD AUTO: 5.4 K/UL (ref 4.6–13.2)

## 2022-05-02 PROCEDURE — 80053 COMPREHEN METABOLIC PANEL: CPT

## 2022-05-02 PROCEDURE — 85610 PROTHROMBIN TIME: CPT

## 2022-05-02 PROCEDURE — 82962 GLUCOSE BLOOD TEST: CPT

## 2022-05-02 PROCEDURE — 93005 ELECTROCARDIOGRAM TRACING: CPT

## 2022-05-02 PROCEDURE — 83735 ASSAY OF MAGNESIUM: CPT

## 2022-05-02 PROCEDURE — 85025 COMPLETE CBC W/AUTO DIFF WBC: CPT

## 2022-05-02 PROCEDURE — 83880 ASSAY OF NATRIURETIC PEPTIDE: CPT

## 2022-05-02 PROCEDURE — 71045 X-RAY EXAM CHEST 1 VIEW: CPT

## 2022-05-02 PROCEDURE — 99285 EMERGENCY DEPT VISIT HI MDM: CPT

## 2022-05-02 PROCEDURE — 70450 CT HEAD/BRAIN W/O DYE: CPT

## 2022-05-02 PROCEDURE — 84484 ASSAY OF TROPONIN QUANT: CPT

## 2022-05-02 RX ORDER — METOPROLOL TARTRATE 50 MG/1
50 TABLET ORAL 2 TIMES DAILY
Qty: 30 TABLET | Refills: 0 | Status: SHIPPED | OUTPATIENT
Start: 2022-05-02

## 2022-05-02 NOTE — ED NOTES
Pt arrived via EMS from home d/t weakness  Pt has been taking insulin and Abilify for weight loss  Pt states she has been taking insulin for weight loss since February and just started Abilify a few weeks ago  Per pt has not been eating like she should   BS 77 per EMS

## 2022-05-02 NOTE — ED PROVIDER NOTES
81-year-old female with many medical conditions including asthma, bipolar, chronic pain, fibromyalgia, heart failure, hypertension, migraines, morbid obesity, obstructive sleep apnea, its of hernia and polysubstance abuse presents to the emergency department with complaint of fatigue shortness of breath and on arrival was found to be tachycardic. Patient states she was at her physical therapy appointment started having symptoms of fatigue and palpitations and was brought to the ER at the urging of physical therapist via ambulance for evaluation. On arrival patient was tachycardic and initial EKG showed A. fib and RVR which patient has no history of. She was immediately placed on a monitor and moved to her room. On my initial evaluation of the patient A. fib and RVR had broken spontaneously. Her main complaint is fatigue as well as hearing voices again. She describes the voices as being they are telling her things, and example she gave was she heard a voice I told her to bring her roommate some cooking where and when she did this roommate said that he had never asked for it.   She has no SI or HI noted she is in no acute distress on my examination           Past Medical History:   Diagnosis Date    Arthritis     Asthma     Bipolar disorder ([address]     Cardiomyopathy ([address]     Carpal tunnel syndrome     Chronic back pain     Chronic pain     Depression     DJD (degenerative joint disease) of knee     wheelchair bound    Fibromyalgia     GERD (gastroesophageal reflux disease)     avoids food triggers    Heart failure ([address]     12/20 EF 65-70%    Herpes     Hypertension     Irregular menses     Kidney disease     Migraines     Morbid obesity ([address]     Morbid obesity with BMI of 70 and over, adult ([address]     Obstructive sleep apnea     uses biPAP, Dr Anel Alba    Pseudotumor cerebri     Sarcoidosis     lungs    Schizophrenia ([address]     Smoker     TOM (stress urinary incontinence, female)     Uses powered wheelchair        Past Surgical History:   Procedure Laterality Date    HX HERNIA REPAIR      [facility] repair age 16    HX TONSILLECTOMY           Family History:   Problem Relation Age of Onset    Hypertension Mother     Psychiatric Disorder Mother     Cancer Father        Social History     Socioeconomic History    Marital status:      Spouse name: Not on file    Number of children: Not on file    Years of education: Not on file    Highest education level: Not on file   Occupational History    Not on file   Tobacco Use    Smoking status: Former Smoker     Packs/day: 0.25     Years: 23.00     Pack years: 5.75     Types: Cigarettes     Quit date: 2021     Years since quittin.9    Smokeless tobacco: Never Used   Vaping Use    Vaping Use: Never used   Substance and Sexual Activity    Alcohol use: No    Drug use: No    Sexual activity: Yes   Other Topics Concern    Not on file   Social History Narrative    Not on file     Social Determinants of Health     Financial Resource Strain:     Difficulty of Paying Living Expenses: Not on file   Food Insecurity:     Worried About [address] in the Last Year: Not on file    [address] in the Last Year: Not on file   Transportation Needs:     Lack of Transportation (Medical): Not on file    Lack of Transportation (Non-Medical):  Not on file   Physical Activity:     Days of Exercise per Week: Not on file    Minutes of Exercise per Session: Not on file   Stress:     Feeling of Stress : Not on file   Social Connections:     Frequency of Communication with Friends and Family: Not on file    Frequency of Social Gatherings with Friends and Family: Not on file    Attends Restorationism Services: Not on file    Active Member of Clubs or Organizations: Not on file    Attends Club or Organization Meetings: Not on file    Marital Status: Not on file   Intimate Partner Violence:     Fear of Current or Ex-Partner: Not on file   [facility] Emotionally Abused: Not on file    Physically Abused: Not on file    Sexually Abused: Not on file   Housing Stability:     Unable to Pay for Housing in the Last Year: Not on file    Number of Places Lived in the Last Year: Not on file    Unstable Housing in the Last Year: Not on file         ALLERGIES: Penicillins, Morphine, Trazodone, and Vicodin [hydrocodone-acetaminophen]    Review of Systems   Constitutional: Positive for activity change, appetite change and fatigue. Negative for chills, diaphoresis, fever and unexpected weight change. HENT: Negative. Eyes: Negative. Respiratory: Positive for shortness of breath. Negative for apnea, cough, choking, chest tightness, wheezing and stridor. Cardiovascular: Positive for palpitations. Negative for chest pain and leg swelling. Gastrointestinal: Negative. Endocrine: Negative. Genitourinary: Negative. Musculoskeletal: Positive for arthralgias, back pain, gait problem and myalgias. Negative for neck pain and neck stiffness. Skin: Negative. Allergic/Immunologic: Negative. Hematological: Negative. Psychiatric/Behavioral: Positive for hallucinations. Vitals:    05/02/22 1224 05/02/22 1225 05/02/22 1226 05/02/22 1227   BP:   111/62    Pulse: 68 68 67 68   Resp: 17 15 23 16   SpO2:   100% 100%   Weight:    138.3 kg (305 lb)   Height:    5' (1.524 m)            Physical Exam  Vitals and nursing note reviewed. Constitutional:       General: She is not in acute distress. Appearance: Normal appearance. She is obese. She is not ill-appearing, toxic-appearing or diaphoretic. HENT:      Head: Normocephalic and atraumatic. Nose: Nose normal. No congestion or rhinorrhea. Mouth/Throat:      Mouth: Mucous membranes are moist.      Pharynx: Oropharynx is clear. No oropharyngeal exudate or posterior oropharyngeal erythema. Eyes:      General: No scleral icterus. Right eye: No discharge. Left eye: No discharge. Extraocular Movements: Extraocular movements intact. Conjunctiva/sclera: Conjunctivae normal.      Pupils: Pupils are equal, round, and reactive to light. Cardiovascular:      Rate and Rhythm: Tachycardia present. Rhythm irregular. Pulses: Normal pulses. Heart sounds: Murmur heard. No friction rub. No gallop. Pulmonary:      Effort: Pulmonary effort is normal. No respiratory distress. Breath sounds: Normal breath sounds. No stridor. No wheezing, rhonchi or rales. Abdominal:      General: Abdomen is flat. There is no distension. Tenderness: There is no abdominal tenderness. There is no guarding. Hernia: No hernia is present. Musculoskeletal:         General: No swelling, tenderness, deformity or signs of injury. Normal range of motion. Cervical back: Normal range of motion. No rigidity or tenderness. Lymphadenopathy:      Cervical: No cervical adenopathy. Skin:     General: Skin is warm and dry. Capillary Refill: Capillary refill takes less than 2 seconds. Neurological:      General: No focal deficit present. Mental Status: She is alert and oriented to person, place, and time. Mental status is at baseline. Psychiatric:         Attention and Perception: Attention normal. She perceives auditory hallucinations. Mood and Affect: Mood normal.         Speech: Speech normal.         Behavior: Behavior normal.         Thought Content: Thought content is delusional.          MDM  Number of Diagnoses or Management Options  Atrial fibrillation, currently in sinus rhythm  Chronic fatigue  Diagnosis management comments: 81-year-old female many past medical and current medical issues see HPI presents to the ED from her physical therapy appointment secondary to fatigue. On exam patient was tachycardic and EKG was done on arrival.  EKG showed A. fib with RVR but no ST elevations consistent with an acute STEMI. Patient has no history of A. fib.   Lungs were clear to auscultation bilaterally heart sounds were tachycardic and irregular. Abdomen is obese but soft nontender nondistended. Her main complaint is weakness. She additionally describes as of late hearing some voices. This is not uncommon for her as she has schizophrenia however the voices are not telling her to hurt herself or anyone else. IV started labs sent chest x-ray done plan for possible diltiazem versus Lopressor however patient spontaneously converted into normal sinus rhythm. She is already on 25 mg of Lopressor twice a day. CT of the head was done which shows no acute findings. Chest x-ray is unremarkable mild elevation creatinine otherwise labs are unremarkable. Patient was watched in the emergency room did not fall back into A. fib during her 4-hour stay. Opponent was undetectable. Had long discussion with patient about medication compliance. Increased her metoprolol from 25 twice daily to 50 twice daily.   Patient to follow-up with cardiology and her  as an outpatient         Procedures

## 2022-07-14 DIAGNOSIS — E66.01 MORBID OBESITY (HCC): Primary | ICD-10-CM

## 2022-07-14 DIAGNOSIS — G47.33 OBSTRUCTIVE SLEEP APNEA: ICD-10-CM

## 2022-07-14 DIAGNOSIS — Z01.812 BLOOD TESTS PRIOR TO TREATMENT OR PROCEDURE: ICD-10-CM

## 2022-07-14 DIAGNOSIS — E66.01 MORBID OBESITY WITH BMI OF 70 AND OVER, ADULT (HCC): ICD-10-CM

## 2022-07-14 DIAGNOSIS — I10 PRIMARY HYPERTENSION: ICD-10-CM

## 2022-09-23 ENCOUNTER — TELEPHONE (OUTPATIENT)
Dept: SURGERY | Age: 47
End: 2022-09-23

## 2022-09-23 NOTE — TELEPHONE ENCOUNTER
Tried getting in touch with patient to go over pre surgical information but her phone says its not taking calls at this time /am

## 2022-11-16 LAB — ANTIBODY: NORMAL

## 2022-11-19 LAB
ESTIMATED AVERAGE GLUCOSE: 99
HBA1C MFR BLD: 5.1 %

## 2023-10-25 ENCOUNTER — OFFICE VISIT (OUTPATIENT)
Age: 48
End: 2023-10-25

## 2023-10-25 VITALS
HEART RATE: 68 BPM | HEIGHT: 60 IN | SYSTOLIC BLOOD PRESSURE: 151 MMHG | OXYGEN SATURATION: 100 % | BODY MASS INDEX: 59.57 KG/M2 | DIASTOLIC BLOOD PRESSURE: 108 MMHG | TEMPERATURE: 98.7 F

## 2023-10-25 DIAGNOSIS — E66.01 MORBID OBESITY (HCC): Primary | ICD-10-CM

## 2023-10-25 DIAGNOSIS — I10 HYPERTENSION, UNSPECIFIED TYPE: ICD-10-CM

## 2023-10-25 DIAGNOSIS — D86.0 PULMONARY SARCOIDOSIS (HCC): ICD-10-CM

## 2023-10-25 DIAGNOSIS — M54.50 CHRONIC LOW BACK PAIN, UNSPECIFIED BACK PAIN LATERALITY, UNSPECIFIED WHETHER SCIATICA PRESENT: ICD-10-CM

## 2023-10-25 DIAGNOSIS — G89.29 CHRONIC LOW BACK PAIN, UNSPECIFIED BACK PAIN LATERALITY, UNSPECIFIED WHETHER SCIATICA PRESENT: ICD-10-CM

## 2023-10-25 DIAGNOSIS — M17.0 OSTEOARTHRITIS OF BOTH KNEES, UNSPECIFIED OSTEOARTHRITIS TYPE: ICD-10-CM

## 2023-10-25 DIAGNOSIS — G47.33 OBSTRUCTIVE SLEEP APNEA: ICD-10-CM

## 2023-10-25 DIAGNOSIS — I42.9 CARDIOMYOPATHY, UNSPECIFIED TYPE (HCC): ICD-10-CM

## 2023-10-25 DIAGNOSIS — I69.359 CVA, OLD, HEMIPARESIS (HCC): ICD-10-CM

## 2023-10-25 PROCEDURE — 3080F DIAST BP >= 90 MM HG: CPT | Performed by: SPECIALIST

## 2023-10-25 PROCEDURE — 99214 OFFICE O/P EST MOD 30 MIN: CPT | Performed by: SPECIALIST

## 2023-10-25 PROCEDURE — 3075F SYST BP GE 130 - 139MM HG: CPT | Performed by: SPECIALIST

## 2023-10-25 RX ORDER — AMLODIPINE BESYLATE 10 MG/1
1 TABLET ORAL DAILY
COMMUNITY
Start: 2022-06-09

## 2023-10-25 RX ORDER — AMIODARONE HYDROCHLORIDE 200 MG/1
TABLET ORAL
COMMUNITY
Start: 2023-09-19

## 2023-10-25 RX ORDER — ARIPIPRAZOLE 5 MG/1
TABLET ORAL
COMMUNITY
Start: 2023-10-18

## 2024-04-15 NOTE — PROGRESS NOTES
Pre-Operative Progress Consultation  \"Restart\" consult in Feb 2021 with a weight of 361 lbs    Subjective:     Yoel Mehta is a 40 y.o. obese female with a Body mass index is 57.7 kg/m². Mile Chan she desires surgery at this time because of health issues and quality of life issues. Yoel Mehta has tried multiple diets in her lifetime most recently tried physician supervised, behavior modification and unsupervised diets. She was seen in early Feb of this year at a weight of 342 lbs with the following summarization; Off prednisone since mid September    No smoking since March 2021    UGI done 3/17/21 showed normal anatomy. Cardiac clearance received and scanned under media tab following abnormal stress test in July.     After that Feb visit she was called by the office in regards to a Cr- level of 1.8. She contacted her [place] PCP as requested who  stated, according to the patient, that was a normal value that did not need attention.     She has lost a total of 56 lbs after an impressive weight loss over the past 2.5 months    Bariatric comorbidities present are   Patient Active Problem List   Diagnosis Code    Pulmonary sarcoidosis ([facility] D86.0    Hypertension I10    Bipolar disorder ([facility] F31.9    Asthma J45.909    GEORGE treated with BiPAP G47.33    GERD (gastroesophageal reflux disease) K21.9    Arthritis M19.90    Morbid obesity ([facility] E66.01    COPD (chronic obstructive pulmonary disease) ([facility]) J44.9    Chronic heart failure with preserved ejection fraction ([facility]) I50.32    Cardiomyopathy ([facility]) I42.9    Chronic pain G89.29    Sarcoidosis D86.9    Obstructive sleep apnea G47.33    Morbid obesity with BMI of 60.0-69.9, adult ([facility]) E66.01, Z68.44    Smoker F17.200    Irregular menses N92.6    Fibromyalgia M79.7    Carpal tunnel syndrome G56.00    TOM (stress urinary incontinence, female) N39.3    Migraines G43.909    Uses powered wheelchair Z99.3    Chronic back pain M54.9, Subjective   Patient ID: Keara is a 46 year old female.    Chief Complaint: New Patient, Colonoscopy Consult    HPI  {History Review (Optional):65539}    Review of Systems  Objective   Physical Exam  Assessment   {Assess/Plan SmartLinks (Optional):[id number]}   G89.29    DJD (degenerative joint disease) of knee M17.10   . The patient desires laparoscopic gastric bypass surgery for surgical weight loss. Shayan Galloway is here today to check progress with weight loss / evaluate nutritional status and review all subspecialty clearances in hopes of proceeding to the operating room. Patient Active Problem List    Diagnosis Date Noted    Cardiomyopathy [facility])     Chronic pain     Sarcoidosis     Obstructive sleep apnea     Morbid obesity with BMI of 60.0-69.9, adult ([address]     Smoker     Irregular menses     Fibromyalgia     Carpal tunnel syndrome     TOM (stress urinary incontinence, female)     Migraines     Uses powered wheelchair     Chronic back pain     DJD (degenerative joint disease) of knee     Chronic heart failure with preserved ejection fraction (HCC)     COPD (chronic obstructive pulmonary disease) ([facility] 02/10/2017    Morbid obesity ([facility]     Asthma     GEORGE treated with BiPAP     GERD (gastroesophageal reflux disease)     Arthritis     Pulmonary sarcoidosis (HCC)     Hypertension     Bipolar disorder ([facility]       Past Surgical History:   Procedure Laterality Date    HX HERNIA REPAIR      UH repair age 16   [de-identified] TONSILLECTOMY        Social History     Tobacco Use    Smoking status: Former Smoker     Packs/day: 0.25     Years: 23.00     Pack years: 5.75     Types: Cigarettes     Quit date: 2021     Years since quittin.8    Smokeless tobacco: Never Used   Substance Use Topics    Alcohol use: No      Family History   Problem Relation Age of Onset    Hypertension Mother     Psychiatric Disorder Mother     Cancer Father       Current Outpatient Medications   Medication Sig Dispense Refill    acetaZOLAMIDE SR (DIAMOX) 500 mg capsule Take 500 mg by mouth two (2) times a day.  diphenhydrAMINE (BENADRYL) 25 mg capsule Take 25 mg by mouth every six (6) hours as needed.       divalproex ER (DEPAKOTE ER) 250 mg ER tablet Take 250 mg by mouth two (2) times a day.  fluconazole (DIFLUCAN) 150 mg tablet PLEASE SEE ATTACHED FOR DETAILED DIRECTIONS      pantoprazole (PROTONIX) 40 mg tablet Take 40 mg by mouth daily.  BD Ultra-Fine Short Pen Needle 31 gauge x 5/16\" ndle USE TO INJECT VICTOZA ONE TIME DAILY      amLODIPine (NORVASC) 10 mg tablet Take 10 mg by mouth daily.  Restasis 0.05 % dpet       latanoprost (XALATAN) 0.005 % ophthalmic solution INSTILL 1 DROP INTO BOTH EYES AT BEDTIME      linaCLOtide (Linzess) 72 mcg cap capsule Take 72 mcg by mouth.  omeprazole (PRILOSEC) 20 mg capsule       ondansetron hcl (ZOFRAN) 4 mg tablet TAKE 1 TABLET BY MOUTH EVERY 8 HOURS AS NEEDED FOR NAUSEA AND VOMITING      tolterodine ER (DETROL LA) 4 mg ER capsule Take 4 mg by mouth daily.  atorvastatin (LIPITOR) 40 mg tablet Take 40 mg by mouth daily.  buPROPion XL (WELLBUTRIN XL) 300 mg XL tablet Take 300 mg by mouth two (2) times a day.  busPIRone (BUSPAR) 10 mg tablet Take 30 mg by mouth two (2) times a day.  hydrOXYzine pamoate (VISTARIL) 50 mg capsule Take 100 mg by mouth four (4) times daily.  liraglutide (VICTOZA) 0.6 mg/0.1 mL (18 mg/3 mL) pnij 0.6 mg by SubCUTAneous route.  budesonide-formoteroL (SYMBICORT) 80-4.5 mcg/actuation HFAA Take 2 Puffs by inhalation two (2) times a day.  metoprolol tartrate (LOPRESSOR) 25 mg tablet Take 25 mg by mouth daily.  prazosin (MINIPRESS) 5 mg capsule TAKE ONE AT NIGHT FOR NIGHTMARES      fish oil-omega-3 fatty acids (Fish Oil) 340-1,000 mg capsule Take 500 mg by mouth daily.  cetirizine (ZYRTEC) 10 mg tablet Take 10 mg by mouth daily. 2    cyclobenzaprine (FLEXERIL) 10 mg tablet Take 1 Tab by mouth two (2) times daily as needed for Muscle Spasm(s). Prescribed and managed by Dr. Poncho Contreras, [address] [place] [address].   Indications: Disorder characterized by Stiff, Tender & Painful Muscles      spironolactone (ALDACTONE) 25 mg tablet Take 1 Tab by mouth daily. Prescribed and managed by Dr. Susan Moreno, [facility].  furosemide (LASIX) 80 mg tablet Take 1 Tab by mouth two (2) times a day. Prescribed and managed by Dr. Susan Moreno, [facility]  3    levothyroxine (SYNTHROID) 25 mcg tablet Take 1 Tab by mouth Daily (before breakfast). Prescribed and managed by Dr. Elbert Barcenas. 0    montelukast (SINGULAIR) 10 mg tablet TAKE 1 TABLET BY MOUTH AT BEDTIME  3    fluticasone (FLONASE) 50 mcg/actuation nasal spray 2 Sprays by Both Nostrils route daily.  albuterol (PROVENTIL HFA, VENTOLIN HFA, PROAIR HFA) 90 mcg/actuation inhaler Take 2 Puffs by inhalation every four (4) hours as needed for Wheezing.  1 Inhaler 1     Allergies   Allergen Reactions    Penicillins Other (comments)     Sore throat, dry mouth    Morphine Itching and Swelling    Trazodone Other (comments)     Patient overdosed on medication in the past    Vicodin [Hydrocodone-Acetaminophen] Itching          Review of Systems:        General - No history or complaints of unexpected fever, chills, or weight loss  Head/Neck - No history or complaints of headache, diplopia, dysphagia, hearing loss  Cardiac - No history or complaints of chest pain, palpitations, murmur, or shortness of breath  Pulmonary - No history or complaints of shortness of breath, productive cough, hemoptysis  Gastrointestinal - has reflux uses H2 blocker prn reflux,  abdominal pain, obstipation/constipation, blood per rectum  Genitourinary - No history or complaints of hematuria/dysuria, stress urinary incontinence symptoms, or renal lithiasis  Musculoskeletal - has joint pain in her knees, has muscular weakness, uses wheelchair  Hematologic - No history or complaints of bleeding disorders, blood transfusions, sickle cell anemia  Neurologic - No history or complaints of  migraine headaches, seizure activity, syncopal episodes, TIA or stroke  Integumentary - No history or complaints of rashes, abnormal nevi, skin cancer  Gynecological - No history of heavy menses/abnormal menses    Objective:     Visit Vitals  /79   Pulse 86   Temp 96.8 °F (36 °C)   Ht 5' 1\" (1.549 m)   Wt 138.5 kg (305 lb 6.4 oz)   SpO2 100%   BMI 57.70 kg/m²       Physical Examination: General appearance - alert, well appearing, and in no distress  Mental status - alert, oriented to person, place, and time  Eyes - pupils equal and reactive, extraocular eye movements intact  Nose - normal and patent, no erythema, discharge or polyps  Mouth - mucous membranes moist, pharynx normal without lesions  Neck - supple, no significant adenopathy  Lymphatics - no palpable lymphadenopathy, no hepatosplenomegaly  Chest - clear to auscultation, no wheezes, rales or rhonchi, symmetric air entry  Heart - normal rate, regular rhythm, normal S1, S2, no murmurs, rubs, clicks or gallops  Abdomen - soft, nontender, nondistended, no masses or organomegaly  Back exam - full range of motion, no tenderness, palpable spasm or pain on motion  Neurological - alert, oriented, normal speech, no focal findings or movement disorder noted  Musculoskeletal - no joint tenderness, deformity or swelling  Extremities - peripheral pulses normal, no pedal edema, no clubbing or cyanosis  Skin - normal coloration and turgor, no rashes, no suspicious skin lesions noted    Labs:             Assessment:     Morbid obesity with associated comorbidity     Plan:     Continuation of Pre-Operative evaluation / clearance. Sandra Amanda has returned to the office today to discuss her status as a surgical candidate.  her progress has been noted and reviewed. We will continue the pre-operative process and work towards goals as outlined. She states that after her weight loss she has completed all \"criteria\" to include being smoke free for over a year and being of all steroids for \"months\"    Sandra Amanda understand the rationales for all the above.   It has been discussed that given her obese condition that the best surgical option for this patient would be the 2-stage sleeve to bypass. Radha Jeffries agrees with the surgical choice and has been educated in it's; risks, benefits, and alternatives. We will continue with the pre-operative evaluation as needed to check progress. The patient understands the plan of action    The plan going forward will be to proceed with the gastric bypass but will revert to the sleeve if that is not possible    Secondary Diagnoses:     Dietary Intervention  - The patient is currently scheduled to see or has been followed by a bariatric nutritionist for an attempt at preoperative weight loss as has been dictated by their insurance carrier. They will be assessed at various times during their follow up to evaluate their progress depending on the length of time that is required once again by their carrier. I have explained the importance of preoperative weight loss and the benefits regarding lower surgical risk and also assisting the patient in reaching their weight loss goal.  Finally they understand their is a physiologic benefit from the standpoint of hepatic volume reduction preoperatively.   I have reiterated the importance of a low carbohydrate and high protein regimen to achieve their stated goal.      Signed By: Baljeet New MD     April 20, 2022

## 2024-05-01 LAB
CHOLESTEROL, TOTAL: 134 MG/DL
CHOLESTEROL/HDL RATIO: 2.9
HDLC SERPL-MCNC: 47 MG/DL (ref 35–70)
LDL CHOLESTEROL: 65
NONHDLC SERPL-MCNC: 87 MG/DL
TRIGL SERPL-MCNC: 110 MG/DL
VLDLC SERPL CALC-MCNC: 22 MG/DL

## 2024-07-12 ENCOUNTER — TELEPHONE (OUTPATIENT)
Facility: CLINIC | Age: 49
End: 2024-07-12

## 2024-07-12 ENCOUNTER — OFFICE VISIT (OUTPATIENT)
Facility: CLINIC | Age: 49
End: 2024-07-12
Payer: MEDICAID

## 2024-07-12 VITALS
RESPIRATION RATE: 20 BRPM | OXYGEN SATURATION: 95 % | TEMPERATURE: 98.7 F | DIASTOLIC BLOOD PRESSURE: 74 MMHG | WEIGHT: 293 LBS | SYSTOLIC BLOOD PRESSURE: 127 MMHG | HEIGHT: 60 IN | HEART RATE: 84 BPM | BODY MASS INDEX: 57.52 KG/M2

## 2024-07-12 DIAGNOSIS — I69.359 CVA, OLD, HEMIPARESIS (HCC): Primary | ICD-10-CM

## 2024-07-12 DIAGNOSIS — F31.9 BIPOLAR AFFECTIVE DISORDER, REMISSION STATUS UNSPECIFIED (HCC): ICD-10-CM

## 2024-07-12 DIAGNOSIS — E66.01 MORBID OBESITY (HCC): ICD-10-CM

## 2024-07-12 DIAGNOSIS — Z76.89 ENCOUNTER TO ESTABLISH CARE: ICD-10-CM

## 2024-07-12 DIAGNOSIS — J44.9 CHRONIC OBSTRUCTIVE PULMONARY DISEASE, UNSPECIFIED COPD TYPE (HCC): ICD-10-CM

## 2024-07-12 PROBLEM — F20.9 CHRONIC SCHIZOPHRENIA (HCC): Status: ACTIVE | Noted: 2024-07-12

## 2024-07-12 PROCEDURE — 99204 OFFICE O/P NEW MOD 45 MIN: CPT

## 2024-07-12 PROCEDURE — 3078F DIAST BP <80 MM HG: CPT

## 2024-07-12 PROCEDURE — 3074F SYST BP LT 130 MM HG: CPT

## 2024-07-12 RX ORDER — OXCARBAZEPINE 300 MG/1
300 TABLET, FILM COATED ORAL 2 TIMES DAILY
COMMUNITY
Start: 2024-07-08

## 2024-07-12 RX ORDER — RISPERIDONE 1 MG/1
1 TABLET ORAL NIGHTLY
COMMUNITY
Start: 2018-04-16

## 2024-07-12 RX ORDER — ZOLPIDEM TARTRATE 10 MG/1
1 TABLET ORAL
COMMUNITY

## 2024-07-12 RX ORDER — ASPIRIN 81 MG/1
81 TABLET ORAL DAILY
COMMUNITY

## 2024-07-12 RX ORDER — BENZTROPINE MESYLATE 1 MG/1
1 TABLET ORAL DAILY
COMMUNITY
Start: 2024-06-20

## 2024-07-12 RX ORDER — SERTRALINE HYDROCHLORIDE 25 MG/1
25 TABLET, FILM COATED ORAL DAILY
COMMUNITY
Start: 2024-06-16

## 2024-07-12 RX ORDER — OXYCODONE HYDROCHLORIDE 10 MG/1
10 TABLET, FILM COATED, EXTENDED RELEASE ORAL EVERY 12 HOURS
COMMUNITY
Start: 2024-06-29

## 2024-07-12 RX ORDER — POTASSIUM CHLORIDE 1500 MG/1
20 TABLET, EXTENDED RELEASE ORAL DAILY
COMMUNITY
Start: 2024-04-08

## 2024-07-12 RX ORDER — PREGABALIN 50 MG/1
50 CAPSULE ORAL EVERY 12 HOURS
COMMUNITY

## 2024-07-12 RX ORDER — TAMSULOSIN HYDROCHLORIDE 0.4 MG/1
0.4 CAPSULE ORAL DAILY
COMMUNITY
Start: 2024-06-22

## 2024-07-12 SDOH — ECONOMIC STABILITY: FOOD INSECURITY: WITHIN THE PAST 12 MONTHS, YOU WORRIED THAT YOUR FOOD WOULD RUN OUT BEFORE YOU GOT MONEY TO BUY MORE.: OFTEN TRUE

## 2024-07-12 SDOH — ECONOMIC STABILITY: FOOD INSECURITY: WITHIN THE PAST 12 MONTHS, THE FOOD YOU BOUGHT JUST DIDN'T LAST AND YOU DIDN'T HAVE MONEY TO GET MORE.: OFTEN TRUE

## 2024-07-12 SDOH — ECONOMIC STABILITY: HOUSING INSECURITY
IN THE LAST 12 MONTHS, WAS THERE A TIME WHEN YOU DID NOT HAVE A STEADY PLACE TO SLEEP OR SLEPT IN A SHELTER (INCLUDING NOW)?: YES

## 2024-07-12 SDOH — ECONOMIC STABILITY: INCOME INSECURITY: HOW HARD IS IT FOR YOU TO PAY FOR THE VERY BASICS LIKE FOOD, HOUSING, MEDICAL CARE, AND HEATING?: VERY HARD

## 2024-07-12 ASSESSMENT — COLUMBIA-SUICIDE SEVERITY RATING SCALE - C-SSRS
6. HAVE YOU EVER DONE ANYTHING, STARTED TO DO ANYTHING, OR PREPARED TO DO ANYTHING TO END YOUR LIFE?: YES
7. DID THIS OCCUR IN THE LAST THREE MONTHS: NO
5. HAVE YOU STARTED TO WORK OUT OR WORKED OUT THE DETAILS OF HOW TO KILL YOURSELF? DO YOU INTEND TO CARRY OUT THIS PLAN?: NO
3. HAVE YOU BEEN THINKING ABOUT HOW YOU MIGHT KILL YOURSELF?: NO
1. WITHIN THE PAST MONTH, HAVE YOU WISHED YOU WERE DEAD OR WISHED YOU COULD GO TO SLEEP AND NOT WAKE UP?: NO
4. HAVE YOU HAD THESE THOUGHTS AND HAD SOME INTENTION OF ACTING ON THEM?: NO
2. HAVE YOU ACTUALLY HAD ANY THOUGHTS OF KILLING YOURSELF?: YES

## 2024-07-12 ASSESSMENT — PATIENT HEALTH QUESTIONNAIRE - PHQ9
3. TROUBLE FALLING OR STAYING ASLEEP: NOT AT ALL
SUM OF ALL RESPONSES TO PHQ QUESTIONS 1-9: 15
SUM OF ALL RESPONSES TO PHQ9 QUESTIONS 1 & 2: 4
2. FEELING DOWN, DEPRESSED OR HOPELESS: SEVERAL DAYS
10. IF YOU CHECKED OFF ANY PROBLEMS, HOW DIFFICULT HAVE THESE PROBLEMS MADE IT FOR YOU TO DO YOUR WORK, TAKE CARE OF THINGS AT HOME, OR GET ALONG WITH OTHER PEOPLE: VERY DIFFICULT
SUM OF ALL RESPONSES TO PHQ QUESTIONS 1-9: 14
1. LITTLE INTEREST OR PLEASURE IN DOING THINGS: NEARLY EVERY DAY
4. FEELING TIRED OR HAVING LITTLE ENERGY: NOT AT ALL
8. MOVING OR SPEAKING SO SLOWLY THAT OTHER PEOPLE COULD HAVE NOTICED. OR THE OPPOSITE, BEING SO FIGETY OR RESTLESS THAT YOU HAVE BEEN MOVING AROUND A LOT MORE THAN USUAL: SEVERAL DAYS
7. TROUBLE CONCENTRATING ON THINGS, SUCH AS READING THE NEWSPAPER OR WATCHING TELEVISION: NEARLY EVERY DAY
5. POOR APPETITE OR OVEREATING: NEARLY EVERY DAY
6. FEELING BAD ABOUT YOURSELF - OR THAT YOU ARE A FAILURE OR HAVE LET YOURSELF OR YOUR FAMILY DOWN: NEARLY EVERY DAY
SUM OF ALL RESPONSES TO PHQ QUESTIONS 1-9: 15
SUM OF ALL RESPONSES TO PHQ QUESTIONS 1-9: 15
9. THOUGHTS THAT YOU WOULD BE BETTER OFF DEAD, OR OF HURTING YOURSELF: SEVERAL DAYS

## 2024-07-12 NOTE — TELEPHONE ENCOUNTER
Called Rehabilitation center to get patient's past progress note, medication list and any labs and diagnostics that we may need. They state the  is faxing it over to us.

## 2024-07-12 NOTE — PATIENT INSTRUCTIONS
[facility] Transportation Resources*  (Call [facility]/211 if need more resources.)        [facility]  What they offer: Senior Services [facility] [employer] offers fixed routes, medical transportation, and on-demand response transit for those age 60+.  Accepts donations for regular service.  Fare: Approximately $4.00 each way  Phone Number: [phone]  Website: [email or web address].[email or web address]    [facility] Paratransit  What they offer: In compliance with the American Disabilities Act, [facility] Transit provides a shared ride, advanced reservation, origin to destination service for disabled individuals who are unable to use regular fixed route public transportation services because of their disabilities.   Phone Number: [phone]  Apply online: [email or web address]     Medicare Advantage Plans  Some plans may provide transportation. Members should contact the Member Services or Transportation number on the back of their insurance card for more information or to schedule transportation.    Medicaid Plans - [facility] Plus     Each health plan has options for transportation services. Contact your insurance provider to schedule transportation. Transportation or Member Services contact information is listed on the back of the insurance card.         Insurance Contacts     o [employer] [facility]   Phone: [phone]   Website:  [email or web address]    o [employer] [facility] Plus   Phone: [phone]  Website: [email or web address]    o [employer] Complete Care   Phone: [phone]  Website: [email or web address]    o [facility] Health Plans   Phone: [phone] or [phone]   Website: [email or web address]    o [employer] [facility] Community Plan   Phone: [phone]  Website: [email or web address]

## 2024-07-15 ENCOUNTER — TELEMEDICINE (OUTPATIENT)
Age: 49
End: 2024-07-15
Payer: MEDICAID

## 2024-07-15 VITALS — BODY MASS INDEX: 57.52 KG/M2 | HEIGHT: 60 IN | WEIGHT: 293 LBS

## 2024-07-15 DIAGNOSIS — D86.0 PULMONARY SARCOIDOSIS (HCC): ICD-10-CM

## 2024-07-15 DIAGNOSIS — I42.9 CARDIOMYOPATHY, UNSPECIFIED TYPE (HCC): ICD-10-CM

## 2024-07-15 DIAGNOSIS — I50.32 CHRONIC HEART FAILURE WITH PRESERVED EJECTION FRACTION (HCC): ICD-10-CM

## 2024-07-15 DIAGNOSIS — I10 HYPERTENSION, UNSPECIFIED TYPE: ICD-10-CM

## 2024-07-15 DIAGNOSIS — K21.9 GASTROESOPHAGEAL REFLUX DISEASE, UNSPECIFIED WHETHER ESOPHAGITIS PRESENT: ICD-10-CM

## 2024-07-15 DIAGNOSIS — E66.01 MORBID OBESITY WITH BODY MASS INDEX (BMI) OF 60.0 TO 69.9 IN ADULT (HCC): Primary | ICD-10-CM

## 2024-07-15 DIAGNOSIS — G47.33 OBSTRUCTIVE SLEEP APNEA: ICD-10-CM

## 2024-07-15 DIAGNOSIS — J44.9 CHRONIC OBSTRUCTIVE PULMONARY DISEASE, UNSPECIFIED COPD TYPE (HCC): ICD-10-CM

## 2024-07-15 PROCEDURE — 99214 OFFICE O/P EST MOD 30 MIN: CPT | Performed by: SPECIALIST

## 2024-07-15 RX ORDER — BUMETANIDE 1 MG/1
1 TABLET ORAL
COMMUNITY
Start: 2024-05-02

## 2024-07-15 RX ORDER — CEFPODOXIME PROXETIL 200 MG/1
TABLET, FILM COATED ORAL
COMMUNITY
Start: 2024-06-10

## 2024-07-15 NOTE — PROGRESS NOTES
Pre-Operative Progress Consultation  Virtual Encounter    Subjective:     Alaina Granados is a 48 y.o. obese female with a calculated Body mass index is 69.33 kg/m²..  she desires surgery at this time because of health issues and quality of life issues.  Alaina Granados has tried multiple diets in her lifetime most recently tried multiple dietary plans.    She has been in our system since Jan 2014.  Here a simple breakdown of her history with this office.    She has been in the system since Jan 2014 with a BMI of 64 - she had completed the process and was slated to have surgery but did not proceed due to life issues and an ultimate move from the area.    She resurfaced in Feb 2021 with a BMI of 70 and in a wheelchair after having a stroke (resulting in the need for a temporary G-tube) that requires her to be a permanent resident at a nursing / rehab facility in [place]    Her last office encounter was in Oct 2023 with Dr. Cortez with a BMI of 59.  The following notation was made at that encounter -     At this juncture due to the patient's significant health deterioration and cerebrovascular accident I feel like surgery is now off the table from the standpoint of weight loss.  She had excellent success with Wegovy and I do feel like that would be the best course of action to have her primary care physician restart her on that medication.  The recommendation therefore is for nonsurgical management of her weight via medications.  I explained all this to the patient today and she is very comfortable with that plan of action.     We will have the nutritionist at her rehab facility call our nutritionist for an appropriate diet to help assist her with further weight loss also.     She reports today that she gets weighed every 2 weeks at her rehab facility and her last weight was 355 lbs correlating to a BMI of 69.    Bariatric comorbidities present are   Patient Active Problem List   Diagnosis    CAROLYNN treated with

## 2024-07-16 NOTE — ASSESSMENT & PLAN NOTE
Chronic   Would benefit from HH; however, patient in facility   Will contact facility to coordinate care   Labs ordered today   Awaiting results

## 2024-09-13 ENCOUNTER — COMMUNITY OUTREACH (OUTPATIENT)
Facility: CLINIC | Age: 49
End: 2024-09-13

## 2025-01-07 ENCOUNTER — CLINICAL DOCUMENTATION (OUTPATIENT)
Age: 50
End: 2025-01-07

## 2025-02-27 ENCOUNTER — COMMUNITY OUTREACH (OUTPATIENT)
Facility: CLINIC | Age: 50
End: 2025-02-27

## 2025-05-16 ENCOUNTER — PATIENT MESSAGE (OUTPATIENT)
Facility: CLINIC | Age: 50
End: 2025-05-16